# Patient Record
Sex: MALE | Race: BLACK OR AFRICAN AMERICAN | NOT HISPANIC OR LATINO
[De-identification: names, ages, dates, MRNs, and addresses within clinical notes are randomized per-mention and may not be internally consistent; named-entity substitution may affect disease eponyms.]

---

## 2017-11-06 ENCOUNTER — APPOINTMENT (OUTPATIENT)
Dept: NEUROLOGY | Facility: CLINIC | Age: 82
End: 2017-11-06
Payer: MEDICARE

## 2017-11-06 VITALS
DIASTOLIC BLOOD PRESSURE: 60 MMHG | SYSTOLIC BLOOD PRESSURE: 130 MMHG | BODY MASS INDEX: 36.07 KG/M2 | HEIGHT: 68 IN | WEIGHT: 238 LBS

## 2017-11-06 DIAGNOSIS — Z86.39 PERSONAL HISTORY OF OTHER ENDOCRINE, NUTRITIONAL AND METABOLIC DISEASE: ICD-10-CM

## 2017-11-06 DIAGNOSIS — Z95.0 PRESENCE OF CARDIAC PACEMAKER: ICD-10-CM

## 2017-11-06 DIAGNOSIS — Z86.79 PERSONAL HISTORY OF OTHER DISEASES OF THE CIRCULATORY SYSTEM: ICD-10-CM

## 2017-11-06 DIAGNOSIS — M54.16 RADICULOPATHY, LUMBAR REGION: ICD-10-CM

## 2017-11-06 DIAGNOSIS — Z87.891 PERSONAL HISTORY OF NICOTINE DEPENDENCE: ICD-10-CM

## 2017-11-06 PROCEDURE — 99204 OFFICE O/P NEW MOD 45 MIN: CPT

## 2017-11-06 RX ORDER — PYRIDOXINE HCL (VITAMIN B6) 100 MG
TABLET ORAL
Refills: 0 | Status: ACTIVE | COMMUNITY

## 2017-11-06 RX ORDER — AMLODIPINE BESYLATE 10 MG/1
10 TABLET ORAL
Refills: 0 | Status: ACTIVE | COMMUNITY

## 2017-11-06 RX ORDER — OMEGA-3/DHA/EPA/FISH OIL 300-1000MG
1000 CAPSULE ORAL
Refills: 0 | Status: ACTIVE | COMMUNITY

## 2017-11-06 RX ORDER — OMEPRAZOLE MAGNESIUM 40 MG/1
CAPSULE, DELAYED RELEASE ORAL
Refills: 0 | Status: ACTIVE | COMMUNITY

## 2017-11-06 RX ORDER — BRIMONIDINE TARTRATE 1 MG/ML
0.1 SOLUTION/ DROPS OPHTHALMIC
Refills: 0 | Status: ACTIVE | COMMUNITY

## 2017-11-06 RX ORDER — DICLOFENAC SODIUM 20 MG/G
SOLUTION TOPICAL
Refills: 0 | Status: ACTIVE | COMMUNITY

## 2017-11-06 RX ORDER — TAMSULOSIN HYDROCHLORIDE 0.4 MG/1
0.4 CAPSULE ORAL
Refills: 0 | Status: ACTIVE | COMMUNITY

## 2017-11-06 RX ORDER — SIMVASTATIN 20 MG/1
20 TABLET, FILM COATED ORAL
Refills: 0 | Status: ACTIVE | COMMUNITY

## 2017-11-06 RX ORDER — POTASSIUM CHLORIDE 20 MEQ
20 TABLET, EXT RELEASE, PARTICLES/CRYSTALS ORAL
Refills: 0 | Status: ACTIVE | COMMUNITY

## 2017-11-06 RX ORDER — METOPROLOL TARTRATE 50 MG/1
50 TABLET, FILM COATED ORAL
Refills: 0 | Status: ACTIVE | COMMUNITY

## 2017-11-06 RX ORDER — LISINOPRIL 10 MG/1
10 TABLET ORAL
Refills: 0 | Status: ACTIVE | COMMUNITY

## 2017-11-06 RX ORDER — FUROSEMIDE 40 MG/1
40 TABLET ORAL
Refills: 0 | Status: ACTIVE | COMMUNITY

## 2019-05-14 ENCOUNTER — OUTPATIENT (OUTPATIENT)
Dept: OUTPATIENT SERVICES | Facility: HOSPITAL | Age: 84
LOS: 1 days | End: 2019-05-14
Payer: MEDICARE

## 2019-05-14 VITALS
TEMPERATURE: 97 F | HEIGHT: 67 IN | WEIGHT: 220.02 LBS | RESPIRATION RATE: 16 BRPM | DIASTOLIC BLOOD PRESSURE: 70 MMHG | HEART RATE: 60 BPM | SYSTOLIC BLOOD PRESSURE: 140 MMHG

## 2019-05-14 DIAGNOSIS — Z90.79 ACQUIRED ABSENCE OF OTHER GENITAL ORGAN(S): Chronic | ICD-10-CM

## 2019-05-14 DIAGNOSIS — Z98.890 OTHER SPECIFIED POSTPROCEDURAL STATES: Chronic | ICD-10-CM

## 2019-05-14 DIAGNOSIS — Z96.652 PRESENCE OF LEFT ARTIFICIAL KNEE JOINT: Chronic | ICD-10-CM

## 2019-05-14 DIAGNOSIS — Z96.651 PRESENCE OF RIGHT ARTIFICIAL KNEE JOINT: Chronic | ICD-10-CM

## 2019-05-14 DIAGNOSIS — Z95.0 PRESENCE OF CARDIAC PACEMAKER: Chronic | ICD-10-CM

## 2019-05-14 DIAGNOSIS — Z98.49 CATARACT EXTRACTION STATUS, UNSPECIFIED EYE: Chronic | ICD-10-CM

## 2019-05-14 DIAGNOSIS — Z12.11 ENCOUNTER FOR SCREENING FOR MALIGNANT NEOPLASM OF COLON: ICD-10-CM

## 2019-05-14 DIAGNOSIS — Z29.9 ENCOUNTER FOR PROPHYLACTIC MEASURES, UNSPECIFIED: ICD-10-CM

## 2019-05-14 DIAGNOSIS — Z90.49 ACQUIRED ABSENCE OF OTHER SPECIFIED PARTS OF DIGESTIVE TRACT: Chronic | ICD-10-CM

## 2019-05-14 DIAGNOSIS — Z01.818 ENCOUNTER FOR OTHER PREPROCEDURAL EXAMINATION: ICD-10-CM

## 2019-05-14 LAB
ANION GAP SERPL CALC-SCNC: 11 MMOL/L — SIGNIFICANT CHANGE UP (ref 5–17)
APTT BLD: 30.9 SEC — SIGNIFICANT CHANGE UP (ref 27.5–36.3)
BUN SERPL-MCNC: 23 MG/DL — HIGH (ref 8–20)
CALCIUM SERPL-MCNC: 9.5 MG/DL — SIGNIFICANT CHANGE UP (ref 8.6–10.2)
CHLORIDE SERPL-SCNC: 109 MMOL/L — HIGH (ref 98–107)
CO2 SERPL-SCNC: 24 MMOL/L — SIGNIFICANT CHANGE UP (ref 22–29)
CREAT SERPL-MCNC: 1.38 MG/DL — HIGH (ref 0.5–1.3)
GLUCOSE SERPL-MCNC: 117 MG/DL — HIGH (ref 70–115)
HCT VFR BLD CALC: 36.1 % — LOW (ref 42–52)
HGB BLD-MCNC: 11.7 G/DL — LOW (ref 14–18)
INR BLD: 1.28 RATIO — HIGH (ref 0.88–1.16)
MCHC RBC-ENTMCNC: 32.1 PG — HIGH (ref 27–31)
MCHC RBC-ENTMCNC: 32.4 G/DL — SIGNIFICANT CHANGE UP (ref 32–36)
MCV RBC AUTO: 98.9 FL — HIGH (ref 80–94)
PLATELET # BLD AUTO: 165 K/UL — SIGNIFICANT CHANGE UP (ref 150–400)
POTASSIUM SERPL-MCNC: 4.4 MMOL/L — SIGNIFICANT CHANGE UP (ref 3.5–5.3)
POTASSIUM SERPL-SCNC: 4.4 MMOL/L — SIGNIFICANT CHANGE UP (ref 3.5–5.3)
PROTHROM AB SERPL-ACNC: 14.8 SEC — HIGH (ref 10–12.9)
RBC # BLD: 3.65 M/UL — LOW (ref 4.6–6.2)
RBC # FLD: 13.8 % — SIGNIFICANT CHANGE UP (ref 11–15.6)
SODIUM SERPL-SCNC: 144 MMOL/L — SIGNIFICANT CHANGE UP (ref 135–145)
WBC # BLD: 3.3 K/UL — LOW (ref 4.8–10.8)
WBC # FLD AUTO: 3.3 K/UL — LOW (ref 4.8–10.8)

## 2019-05-14 PROCEDURE — 80048 BASIC METABOLIC PNL TOTAL CA: CPT

## 2019-05-14 PROCEDURE — 93005 ELECTROCARDIOGRAM TRACING: CPT

## 2019-05-14 PROCEDURE — 85027 COMPLETE CBC AUTOMATED: CPT

## 2019-05-14 PROCEDURE — 36415 COLL VENOUS BLD VENIPUNCTURE: CPT

## 2019-05-14 PROCEDURE — 85730 THROMBOPLASTIN TIME PARTIAL: CPT

## 2019-05-14 PROCEDURE — 85610 PROTHROMBIN TIME: CPT

## 2019-05-14 PROCEDURE — 93010 ELECTROCARDIOGRAM REPORT: CPT

## 2019-05-14 NOTE — H&P PST ADULT - NSICDXPROBLEM_GEN_ALL_CORE_FT
PROBLEM DIAGNOSES  Problem: Need for prophylactic measure  Assessment and Plan: Caprini Score 10 High risk,  Surgical team should assess /Strongly recommend pharmacological and mechanical measures for VTE prophylaxis     Problem: Screening for colon cancer  Assessment and Plan: colonoscopy and endoscopy. Medical and cardiac clearance pending

## 2019-05-14 NOTE — H&P PST ADULT - NSICDXPASTSURGICALHX_GEN_ALL_CORE_FT
PAST SURGICAL HISTORY:  H/O total knee replacement, left     History of esophageal surgery     Pacemaker [place] [employer]    S/P cataract surgery     S/P colectomy 1992    S/P TURP

## 2019-05-14 NOTE — H&P PST ADULT - HISTORY OF PRESENT ILLNESS
87 year old male 87 year old male with history of colon cancer and polyps now scheduled for screening colonoscopy and endoscopy, he states that they found some blood in his stool

## 2019-05-14 NOTE — H&P PST ADULT - NSICDXPASTMEDICALHX_GEN_ALL_CORE_FT
PAST MEDICAL HISTORY:  Bradycardia     Colon cancer     Glaucoma     Hypertension     Irregular heart rate     Pacemaker [employer]    Prostate cancer

## 2019-05-14 NOTE — H&P PST ADULT - ASSESSMENT
medications reviewed, instructions given on what medications to take and what not to take. Asked the patient to take the Blood pressure medication/ heart medication on DOP. Asked the patient to consult with cardiologist about holding ASA and Eliquis and the pt  agreed. Pt ststes that he was told by his cardiologist to stop Eliquis 4 days prior to procedure medications reviewed, instructions given on what medications to take and what not to take. Asked the patient to take the Blood pressure medication/ heart medication on DOP. Asked the patient to consult with cardiologist about holding ASA and Eliquis and the pt  agreed. Pt ststes that he was told by his cardiologist to stop Eliquis 4 days prior to procedure  NERISSAI VTE 2.0 SCORE [CLOT updated 2019]    AGE RELATED RISK FACTORS                                                       MOBILITY RELATED FACTORS  [ ] Age 41-60 years                                            (1 Point)                    [ ] Bed rest                                                        (1 Point)  [ ] Age: 61-74 years                                           (2 Points)                  [ ] Plaster cast                                                   (2 Points)  [x ] Age= 75 years                                              (3 Points)                    [ ] Bed bound for more than 72 hours                 (2 Points)    DISEASE RELATED RISK FACTORS                                               GENDER SPECIFIC FACTORS  [ x] Edema in the lower extremities                       (1 Point)              [ ] Pregnancy                                                     (1 Point)  [ ] Varicose veins                                               (1 Point)                     [ ] Post-partum < 6 weeks                                   (1 Point)             [x ] BMI > 25 Kg/m2                                            (1 Point)                     [ ] Hormonal therapy  or oral contraception          (1 Point)                 [ ] Sepsis (in the previous month)                        (1 Point)               [ ] History of pregnancy complications                 (1 point)  [ ] Pneumonia or serious lung disease                                               [ ] Unexplained or recurrent                     (1 Point)           (in the previous month)                               (1 Point)  [ ] Abnormal pulmonary function test                     (1 Point)                 SURGERY RELATED RISK FACTORS  [ ] Acute myocardial infarction                              (1 Point)               [ ]  Section                                             (1 Point)  [ ] Congestive heart failure (in the previous month)  (1 Point)      [x ] Minor surgery                                                  (1 Point)   [ ] Inflammatory bowel disease                             (1 Point)               [ ] Arthroscopic surgery                                        (2 Points)  [ ] Central venous access                                      (2 Points)                [ ] General surgery lasting more than 45 minutes (2 points)  [x ] Malignancy- Present or previous                   (2 Points)                [ ] Elective arthroplasty                                         (5 points)    [ ] Stroke (in the previous month)                          (5 Points)                                                                                                                                                           HEMATOLOGY RELATED FACTORS                                                 TRAUMA RELATED RISK FACTORS  [ ] Prior episodes of VTE                                     (3 Points)                [ ] Fracture of the hip, pelvis, or leg                       (5 Points)  [ ] Positive family history for VTE                         (3 Points)             [ ] Acute spinal cord injury (in the previous month)  (5 Points)  [ ] Prothrombin 69953 A                                     (3 Points)               [ ] Paralysis  (less than 1 month)                             (5 Points)  [ ] Factor V Leiden                                             (3 Points)                  [ ] Multiple Trauma within 1 month                        (5 Points)  [ ] Lupus anticoagulants                                     (3 Points)                                                           [ ] Anticardiolipin antibodies                               (3 Points)                                                       [ ] High homocysteine in the blood                      (3 Points)                                             [ ] Other congenital or acquired thrombophilia      (3 Points)                                                [ ] Heparin induced thrombocytopenia                  (3 Points)                                     Total Score [8          ]  OPIOID RISK TOOL    FLORIN EACH BOX THAT APPLIES AND ADD TOTALS AT THE END    FAMILY HISTORY OF SUBSTANCE ABUSE                 FEMALE         MALE                                                Alcohol                             [  ]1 pt          [  ]3pts                                               Illegal Drugs                     [  ]2 pts        [  ]3pts                                               Rx Drugs                           [  ]4 pts        [  ]4 pts    PERSONAL HISTORY OF SUBSTANCE ABUSE                                                                                          Alcohol                             [  ]3 pts       [ x ]3 pts                                               Illegal Drugs                     [  ]4 pts        [  ]4 pts                                               Rx Drugs                           [  ]5 pts        [  ]5 pts    AGE BETWEEN 16-45 YEARS                                      [  ]1 pt         [  ]1 pt    HISTORY OF PREADOLESCENT   SEXUAL ABUSE                                                             [  ]3 pts        [  ]0pts    PSYCHOLOGICAL DISEASE                     ADD, OCD, Bipolar, Schizophrenia        [  ]2 pts         [  ]2 pts                      Depression                                               [  ]1 pt           [  ]1 pt           SCORING TOTAL   (add numbers and type here)              (3 )                                     A score of 3 or lower indicated LOW risk for future opioid abuse  A score of 4 to 7 indicated moderate risk for future opioid abuse  A score of 8 or higher indicates a high risk for opioid abuse

## 2019-05-14 NOTE — H&P PST ADULT - EKG AND INTERPRETATION
EKG reviewed, no acute changes, Sinus laura, LBBB, official reading pending. cardiac clearance pending

## 2019-05-20 ENCOUNTER — TRANSCRIPTION ENCOUNTER (OUTPATIENT)
Age: 84
End: 2019-05-20

## 2019-05-21 ENCOUNTER — OUTPATIENT (OUTPATIENT)
Dept: OUTPATIENT SERVICES | Facility: HOSPITAL | Age: 84
LOS: 1 days | End: 2019-05-21
Payer: MEDICARE

## 2019-05-21 ENCOUNTER — RESULT REVIEW (OUTPATIENT)
Age: 84
End: 2019-05-21

## 2019-05-21 DIAGNOSIS — Z95.0 PRESENCE OF CARDIAC PACEMAKER: Chronic | ICD-10-CM

## 2019-05-21 DIAGNOSIS — Z90.49 ACQUIRED ABSENCE OF OTHER SPECIFIED PARTS OF DIGESTIVE TRACT: Chronic | ICD-10-CM

## 2019-05-21 DIAGNOSIS — Z98.890 OTHER SPECIFIED POSTPROCEDURAL STATES: Chronic | ICD-10-CM

## 2019-05-21 DIAGNOSIS — Z90.79 ACQUIRED ABSENCE OF OTHER GENITAL ORGAN(S): Chronic | ICD-10-CM

## 2019-05-21 DIAGNOSIS — Z87.19 PERSONAL HISTORY OF OTHER DISEASES OF THE DIGESTIVE SYSTEM: ICD-10-CM

## 2019-05-21 DIAGNOSIS — Z98.49 CATARACT EXTRACTION STATUS, UNSPECIFIED EYE: Chronic | ICD-10-CM

## 2019-05-21 DIAGNOSIS — Z96.652 PRESENCE OF LEFT ARTIFICIAL KNEE JOINT: Chronic | ICD-10-CM

## 2019-05-21 DIAGNOSIS — Z85.038 PERSONAL HISTORY OF OTHER MALIGNANT NEOPLASM OF LARGE INTESTINE: ICD-10-CM

## 2019-05-21 PROCEDURE — 88305 TISSUE EXAM BY PATHOLOGIST: CPT

## 2019-05-21 PROCEDURE — 88342 IMHCHEM/IMCYTCHM 1ST ANTB: CPT

## 2019-05-21 PROCEDURE — 43239 EGD BIOPSY SINGLE/MULTIPLE: CPT

## 2019-05-21 PROCEDURE — 45380 COLONOSCOPY AND BIOPSY: CPT

## 2019-05-23 LAB — SURGICAL PATHOLOGY STUDY: SIGNIFICANT CHANGE UP

## 2020-03-11 ENCOUNTER — EMERGENCY (EMERGENCY)
Facility: HOSPITAL | Age: 85
LOS: 1 days | Discharge: OTHER INSTITUTION | End: 2020-03-11
Attending: EMERGENCY MEDICINE
Payer: MEDICARE

## 2020-03-11 VITALS
SYSTOLIC BLOOD PRESSURE: 136 MMHG | OXYGEN SATURATION: 99 % | DIASTOLIC BLOOD PRESSURE: 59 MMHG | HEIGHT: 66 IN | TEMPERATURE: 98 F | WEIGHT: 207.9 LBS | HEART RATE: 67 BPM | RESPIRATION RATE: 18 BRPM

## 2020-03-11 VITALS
HEART RATE: 68 BPM | SYSTOLIC BLOOD PRESSURE: 188 MMHG | RESPIRATION RATE: 16 BRPM | DIASTOLIC BLOOD PRESSURE: 68 MMHG | OXYGEN SATURATION: 99 %

## 2020-03-11 DIAGNOSIS — Z98.49 CATARACT EXTRACTION STATUS, UNSPECIFIED EYE: Chronic | ICD-10-CM

## 2020-03-11 DIAGNOSIS — Z90.79 ACQUIRED ABSENCE OF OTHER GENITAL ORGAN(S): Chronic | ICD-10-CM

## 2020-03-11 DIAGNOSIS — Z90.49 ACQUIRED ABSENCE OF OTHER SPECIFIED PARTS OF DIGESTIVE TRACT: Chronic | ICD-10-CM

## 2020-03-11 DIAGNOSIS — Z96.652 PRESENCE OF LEFT ARTIFICIAL KNEE JOINT: Chronic | ICD-10-CM

## 2020-03-11 DIAGNOSIS — Z95.0 PRESENCE OF CARDIAC PACEMAKER: Chronic | ICD-10-CM

## 2020-03-11 DIAGNOSIS — Z98.890 OTHER SPECIFIED POSTPROCEDURAL STATES: Chronic | ICD-10-CM

## 2020-03-11 PROBLEM — C61 MALIGNANT NEOPLASM OF PROSTATE: Chronic | Status: ACTIVE | Noted: 2019-05-14

## 2020-03-11 PROBLEM — C18.9 MALIGNANT NEOPLASM OF COLON, UNSPECIFIED: Chronic | Status: ACTIVE | Noted: 2019-05-14

## 2020-03-11 LAB
ALBUMIN SERPL ELPH-MCNC: 3.5 G/DL — SIGNIFICANT CHANGE UP (ref 3.3–5.2)
ALP SERPL-CCNC: 48 U/L — SIGNIFICANT CHANGE UP (ref 40–120)
ALT FLD-CCNC: 15 U/L — SIGNIFICANT CHANGE UP
ANION GAP SERPL CALC-SCNC: 12 MMOL/L — SIGNIFICANT CHANGE UP (ref 5–17)
APTT BLD: 25.9 SEC — LOW (ref 27.5–36.3)
AST SERPL-CCNC: 20 U/L — SIGNIFICANT CHANGE UP
BASOPHILS # BLD AUTO: 0.04 K/UL — SIGNIFICANT CHANGE UP (ref 0–0.2)
BASOPHILS NFR BLD AUTO: 0.9 % — SIGNIFICANT CHANGE UP (ref 0–2)
BILIRUB SERPL-MCNC: 0.3 MG/DL — LOW (ref 0.4–2)
BLD GP AB SCN SERPL QL: SIGNIFICANT CHANGE UP
BUN SERPL-MCNC: 25 MG/DL — HIGH (ref 8–20)
CALCIUM SERPL-MCNC: 9.1 MG/DL — SIGNIFICANT CHANGE UP (ref 8.6–10.2)
CHLORIDE SERPL-SCNC: 110 MMOL/L — HIGH (ref 98–107)
CO2 SERPL-SCNC: 21 MMOL/L — LOW (ref 22–29)
CREAT SERPL-MCNC: 1.17 MG/DL — SIGNIFICANT CHANGE UP (ref 0.5–1.3)
EOSINOPHIL # BLD AUTO: 0.1 K/UL — SIGNIFICANT CHANGE UP (ref 0–0.5)
EOSINOPHIL NFR BLD AUTO: 2.1 % — SIGNIFICANT CHANGE UP (ref 0–6)
GLUCOSE SERPL-MCNC: 106 MG/DL — HIGH (ref 70–99)
HCT VFR BLD CALC: 26.6 % — LOW (ref 39–50)
HGB BLD-MCNC: 8.4 G/DL — LOW (ref 13–17)
IMM GRANULOCYTES NFR BLD AUTO: 0.4 % — SIGNIFICANT CHANGE UP (ref 0–1.5)
INR BLD: 1.13 RATIO — SIGNIFICANT CHANGE UP (ref 0.88–1.16)
LYMPHOCYTES # BLD AUTO: 1.43 K/UL — SIGNIFICANT CHANGE UP (ref 1–3.3)
LYMPHOCYTES # BLD AUTO: 30.5 % — SIGNIFICANT CHANGE UP (ref 13–44)
MCHC RBC-ENTMCNC: 31.6 GM/DL — LOW (ref 32–36)
MCHC RBC-ENTMCNC: 32.4 PG — SIGNIFICANT CHANGE UP (ref 27–34)
MCV RBC AUTO: 102.7 FL — HIGH (ref 80–100)
MONOCYTES # BLD AUTO: 0.61 K/UL — SIGNIFICANT CHANGE UP (ref 0–0.9)
MONOCYTES NFR BLD AUTO: 13 % — SIGNIFICANT CHANGE UP (ref 2–14)
NEUTROPHILS # BLD AUTO: 2.49 K/UL — SIGNIFICANT CHANGE UP (ref 1.8–7.4)
NEUTROPHILS NFR BLD AUTO: 53.1 % — SIGNIFICANT CHANGE UP (ref 43–77)
OB PNL STL: NEGATIVE — SIGNIFICANT CHANGE UP
PLATELET # BLD AUTO: 209 K/UL — SIGNIFICANT CHANGE UP (ref 150–400)
POTASSIUM SERPL-MCNC: 4.8 MMOL/L — SIGNIFICANT CHANGE UP (ref 3.5–5.3)
POTASSIUM SERPL-SCNC: 4.8 MMOL/L — SIGNIFICANT CHANGE UP (ref 3.5–5.3)
PROT SERPL-MCNC: 6.7 G/DL — SIGNIFICANT CHANGE UP (ref 6.6–8.7)
PROTHROM AB SERPL-ACNC: 12.8 SEC — SIGNIFICANT CHANGE UP (ref 10–12.9)
RBC # BLD: 2.59 M/UL — LOW (ref 4.2–5.8)
RBC # FLD: 13.4 % — SIGNIFICANT CHANGE UP (ref 10.3–14.5)
SODIUM SERPL-SCNC: 143 MMOL/L — SIGNIFICANT CHANGE UP (ref 135–145)
WBC # BLD: 4.69 K/UL — SIGNIFICANT CHANGE UP (ref 3.8–10.5)
WBC # FLD AUTO: 4.69 K/UL — SIGNIFICANT CHANGE UP (ref 3.8–10.5)

## 2020-03-11 PROCEDURE — 93010 ELECTROCARDIOGRAM REPORT: CPT

## 2020-03-11 PROCEDURE — 80053 COMPREHEN METABOLIC PANEL: CPT

## 2020-03-11 PROCEDURE — 85027 COMPLETE CBC AUTOMATED: CPT

## 2020-03-11 PROCEDURE — 96374 THER/PROPH/DIAG INJ IV PUSH: CPT

## 2020-03-11 PROCEDURE — 36415 COLL VENOUS BLD VENIPUNCTURE: CPT

## 2020-03-11 PROCEDURE — 86850 RBC ANTIBODY SCREEN: CPT

## 2020-03-11 PROCEDURE — 99285 EMERGENCY DEPT VISIT HI MDM: CPT

## 2020-03-11 PROCEDURE — 85730 THROMBOPLASTIN TIME PARTIAL: CPT

## 2020-03-11 PROCEDURE — 86900 BLOOD TYPING SEROLOGIC ABO: CPT

## 2020-03-11 PROCEDURE — 86901 BLOOD TYPING SEROLOGIC RH(D): CPT

## 2020-03-11 PROCEDURE — 93005 ELECTROCARDIOGRAM TRACING: CPT

## 2020-03-11 PROCEDURE — 85610 PROTHROMBIN TIME: CPT

## 2020-03-11 PROCEDURE — 99285 EMERGENCY DEPT VISIT HI MDM: CPT | Mod: 25

## 2020-03-11 PROCEDURE — 82272 OCCULT BLD FECES 1-3 TESTS: CPT

## 2020-03-11 RX ORDER — PANTOPRAZOLE SODIUM 20 MG/1
40 TABLET, DELAYED RELEASE ORAL ONCE
Refills: 0 | Status: COMPLETED | OUTPATIENT
Start: 2020-03-11 | End: 2020-03-11

## 2020-03-11 RX ORDER — SODIUM CHLORIDE 9 MG/ML
1000 INJECTION INTRAMUSCULAR; INTRAVENOUS; SUBCUTANEOUS ONCE
Refills: 0 | Status: COMPLETED | OUTPATIENT
Start: 2020-03-11 | End: 2020-03-11

## 2020-03-11 RX ADMIN — SODIUM CHLORIDE 500 MILLILITER(S): 9 INJECTION INTRAMUSCULAR; INTRAVENOUS; SUBCUTANEOUS at 15:34

## 2020-03-11 RX ADMIN — PANTOPRAZOLE SODIUM 40 MILLIGRAM(S): 20 TABLET, DELAYED RELEASE ORAL at 15:34

## 2020-03-11 NOTE — ED ADULT TRIAGE NOTE - CCCP TRG CHIEF CMPLNT
----- Message from Rohini Castro sent at 11/30/2018 11:31 AM CST -----  Contact: Susan Duron    [phone]  Calling to request a refill for cloBAZam (ONFI) 20 mg Tab and VIMPAT 200 mg Tab tablet.    [facility] [place] [place] - [address] AT [facility]             [phone] (Phone)            [phone] (Fax)       rectal bleeding

## 2020-03-11 NOTE — ED ADULT NURSE NOTE - PMH
Bradycardia    Colon cancer    Glaucoma    Hypertension    Irregular heart rate    Pacemaker  [employer]  Prostate cancer

## 2020-03-11 NOTE — ED PROVIDER NOTE - CLINICAL SUMMARY MEDICAL DECISION MAKING FREE TEXT BOX
pt with reported 4 episodes tarry/koko stool over past 2 days without other symptoms, recent colonoscopy and reports infrequent aleve use. spoke to Dr. Jack who states pt was expected at [place] and he had spoke to Dr. Lima. I spoke to Dr. Lima who states pt had recent large cecal polyp several months ago, had it reassessed 8 days ago, very small and was removed. pt with reported 4 episodes tarry/koko stool over past 2 days without other symptoms, recent colonoscopy and reports infrequent aleve use. spoke to Dr. Jack who states pt was expected at [place] and he had spoke to Dr. Lima. I spoke to Dr. Lima who states pt had recent large cecal polyp several months ago, had it reassessed 8 days ago, very small and was removed. given occult blood was neg (tho tarry in appearance possible from residual iron), will check labs, recontact Dr. Lima with hgb, possible obs here and reassess.

## 2020-03-11 NOTE — ED PROVIDER NOTE - PHYSICAL EXAMINATION
Gen: No acute distress, non toxic  HEENT: Mucous membranes moist, pink conjunctivae, EOMI  CV: RRR, nl s1/s2.  Resp: CTAB, normal rate and effort  GI: Abdomen soft, NT, ND. No rebound, no guarding. rectal chaperoned by EBER Matias, with dark tarry with ?slight maroon  : No CVAT  Neuro: A&O x 3, moving all 4 extremities  MSK: No spine or joint tenderness to palpation  Skin: No rashes. intact and perfused.

## 2020-03-11 NOTE — ED ADULT NURSE NOTE - CHPI ED NUR SYMPTOMS NEG
no vomiting/no weakness/no pain/no nausea/no tingling/no chills/no decreased eating/drinking/no fever/no dizziness

## 2020-03-11 NOTE — ED ADULT TRIAGE NOTE - CHIEF COMPLAINT QUOTE
Patient states that he recently had a colonoscopy done and is now having black stools and rectal bleeding. Patient states that he normally takes eliquis

## 2020-03-11 NOTE — ED ADULT NURSE NOTE - OBJECTIVE STATEMENT
Pt c/o of black tarry stool, gas x 2 days. Pt had colonoscopy March 3rd. Pt had some scar tissue removed from a resection of his colon (1992). Pt has denied any SOB, N/V/D, dizziness, recent travel. Pt Pmhx Pacemaker, a fib, resection of colon, achalasia of esophagus, L knee replaced.

## 2020-03-11 NOTE — ED PROVIDER NOTE - OBJECTIVE STATEMENT
89 y/o male hx afib with ppm on eliquis, hx of rectal and prostate cancer, htn c/o 2 days of melena with blood in stool. Had recent colonoscopy 87 y/o male hx afib with ppm on eliquis, hx of rectal and prostate cancer, htn c/o 2 days of melena with blood in stool. Had recent colonoscopy 8 days ago with Dr. Lima at [place]. Follows with Dr. Jack from [facility] for prior colorectal issues who sent pt to [place] for abnl location of polyp. Pt states feels well otherwise, no cp, sob, dizziness, abd pain. States took some aleve the other day. No f/c, no other symptoms. Pt self d/catherine iron and eliquis over past 1.5 days. Had been taking iron for long time wihtout dark stool    ROS: No fever/chills. No eye pain/changes in vision, No ear pain/sore throat/dysphagia, No chest pain/palpitations. No SOB/cough/. No abdominal pain, N/V/D, No dysuria/frequency/discharge, No headache. No Dizziness.    No rashes or breaks in skin. No numbness/tingling/weakness.

## 2020-03-11 NOTE — ED PROVIDER NOTE - PROGRESS NOTE DETAILS
Robinson: pt asymptomatic, hgb 8.7 when prior opne here was <11 though pt with known anemia which is why he is taking iron. Dr. Lima would like pt transferred to [place]. discussed with pt/family, they are agreeable. transfer initiated to [place], no blood transfusion at this time, will monitor.

## 2020-11-11 ENCOUNTER — OUTPATIENT (OUTPATIENT)
Dept: OUTPATIENT SERVICES | Facility: HOSPITAL | Age: 85
LOS: 1 days | End: 2020-11-11

## 2020-11-11 ENCOUNTER — APPOINTMENT (OUTPATIENT)
Dept: NUCLEAR MEDICINE | Facility: CLINIC | Age: 85
End: 2020-11-11
Payer: MEDICARE

## 2020-11-11 DIAGNOSIS — Z90.79 ACQUIRED ABSENCE OF OTHER GENITAL ORGAN(S): Chronic | ICD-10-CM

## 2020-11-11 DIAGNOSIS — Z98.49 CATARACT EXTRACTION STATUS, UNSPECIFIED EYE: Chronic | ICD-10-CM

## 2020-11-11 DIAGNOSIS — Z96.652 PRESENCE OF LEFT ARTIFICIAL KNEE JOINT: Chronic | ICD-10-CM

## 2020-11-11 DIAGNOSIS — Z90.49 ACQUIRED ABSENCE OF OTHER SPECIFIED PARTS OF DIGESTIVE TRACT: Chronic | ICD-10-CM

## 2020-11-11 DIAGNOSIS — Z00.8 ENCOUNTER FOR OTHER GENERAL EXAMINATION: ICD-10-CM

## 2020-11-11 DIAGNOSIS — Z95.0 PRESENCE OF CARDIAC PACEMAKER: Chronic | ICD-10-CM

## 2020-11-11 DIAGNOSIS — Z98.890 OTHER SPECIFIED POSTPROCEDURAL STATES: Chronic | ICD-10-CM

## 2020-11-11 PROCEDURE — 78815 PET IMAGE W/CT SKULL-THIGH: CPT | Mod: 26,PS

## 2020-12-01 RX ADMIN — Medication 650 MILLIGRAM(S): at 19:00

## 2020-12-25 ENCOUNTER — EMERGENCY (EMERGENCY)
Facility: HOSPITAL | Age: 85
LOS: 1 days | Discharge: HOME | End: 2020-12-25
Attending: EMERGENCY MEDICINE
Payer: MEDICARE

## 2020-12-25 VITALS
HEART RATE: 59 BPM | TEMPERATURE: 98 F | DIASTOLIC BLOOD PRESSURE: 77 MMHG | OXYGEN SATURATION: 97 % | RESPIRATION RATE: 16 BRPM | HEIGHT: 66 IN | SYSTOLIC BLOOD PRESSURE: 177 MMHG

## 2020-12-25 DIAGNOSIS — Z96.652 PRESENCE OF LEFT ARTIFICIAL KNEE JOINT: Chronic | ICD-10-CM

## 2020-12-25 DIAGNOSIS — Z98.49 CATARACT EXTRACTION STATUS, UNSPECIFIED EYE: Chronic | ICD-10-CM

## 2020-12-25 DIAGNOSIS — Z90.79 ACQUIRED ABSENCE OF OTHER GENITAL ORGAN(S): Chronic | ICD-10-CM

## 2020-12-25 DIAGNOSIS — Z95.0 PRESENCE OF CARDIAC PACEMAKER: Chronic | ICD-10-CM

## 2020-12-25 DIAGNOSIS — Z98.890 OTHER SPECIFIED POSTPROCEDURAL STATES: Chronic | ICD-10-CM

## 2020-12-25 DIAGNOSIS — Z90.49 ACQUIRED ABSENCE OF OTHER SPECIFIED PARTS OF DIGESTIVE TRACT: Chronic | ICD-10-CM

## 2020-12-25 PROCEDURE — 73610 X-RAY EXAM OF ANKLE: CPT | Mod: 26,LT

## 2020-12-25 PROCEDURE — 73610 X-RAY EXAM OF ANKLE: CPT

## 2020-12-25 PROCEDURE — 99283 EMERGENCY DEPT VISIT LOW MDM: CPT

## 2020-12-25 RX ORDER — OXYCODONE AND ACETAMINOPHEN 5; 325 MG/1; MG/1
1 TABLET ORAL ONCE
Refills: 0 | Status: DISCONTINUED | OUTPATIENT
Start: 2020-12-25 | End: 2020-12-25

## 2020-12-25 RX ADMIN — OXYCODONE AND ACETAMINOPHEN 1 TABLET(S): 5; 325 TABLET ORAL at 05:10

## 2020-12-25 NOTE — ED PROVIDER NOTE - PROGRESS NOTE DETAILS
prelim read: no apparent fractures, significant soft tissue swelling, scleroisis of vessels advised on rest and elevation and fu with ortho/ podiatry.   advised that patient needs repeat sono within a week if still having significant swelling to the lower extremity

## 2020-12-25 NOTE — ED PROVIDER NOTE - PHYSICAL EXAMINATION
left lower extremity diffuse swelling. 2+ dp pulse sensation intact. ecchymotic to the posterior lateral and medial mal. pain with rom of the ankle. 2+ pedal and pretibial edema. no calf tenderness. ankle warm to the touch

## 2020-12-25 NOTE — ED PROVIDER NOTE - OBJECTIVE STATEMENT
88 yo htn, colon ca, prostate cancer, presenting to the ER with left ankle swelling, started about 1 week no accidents or injuries.  no issues with hx of ankle issues or episodes of gout in the past. states that the ankle is very painful, sometimes pulsating. states that he has been taking tylenol and robaxin last taken @ 12pm. states that he had followed with he [place] blood specialist for his prostate cancer and had a sono completed 2 days ago with negative study for DVT. states that he was prescribed tylenol 3 but that the script has not gotten to his pharmacy. denies chest pain, sob, orthopnea. no fever or chills. no back pain.  states that he is not currently getting radiation but that he was given 2 shots for his returned prostate cancer.

## 2020-12-25 NOTE — ED PROVIDER NOTE - NSFOLLOWUPINSTRUCTIONS_ED_ALL_ED_FT
please follow with podiatry  will need to have repeat sono within a week if still having significant swelling to the left leg.   REST and ELEVATE the leg please   tylenol and or motrin for pain control   new or worsening symptoms return to the ER

## 2020-12-25 NOTE — ED PROVIDER NOTE - CLINICAL SUMMARY MEDICAL DECISION MAKING FREE TEXT BOX
89 yo male presenting with atraumatic left ankle pain and lle swelling. outpatient sono with negative dvt. pt with warm ecchymotic left ankle and diffuse swelling to the extremity. good pulses. will check xray pain control. elevate and re-eval

## 2020-12-25 NOTE — ED PROVIDER NOTE - PSH
H/O total knee replacement, left    History of esophageal surgery    Pacemaker  [employer] [employer]  S/P cataract surgery    S/P colectomy  1992  S/P TURP

## 2020-12-25 NOTE — ED PROVIDER NOTE - ATTENDING CONTRIBUTION TO CARE
Winnie: I performed a face to face bedside interview with patient regarding history of present illness, review of symptoms and past medical history. I completed an independent physical exam.  I have discussed patient's plan of care with advanced care provider.   I agree with note as stated above including HISTORY OF PRESENT ILLNESS, HIV, PAST MEDICAL/SURGICAL/FAMILY/SOCIAL HISTORY, ALLERGIES AND HOME MEDICATIONS, REVIEW OF SYSTEMS, PHYSICAL EXAM, MEDICAL DECISION MAKING and any PROGRESS NOTES during the time I functioned as the attending physician for this patient  unless otherwise noted. My brief assessment is as follows: 89M p/w atraumatic L ankle pain/swelling. Negative US yesterday. Did not  his tylenol #3s. No calf ttp. +ecchymosis and swelling to L ankle. Plan for pain control, xray. Patient has pods follow up.

## 2020-12-25 NOTE — ED ADULT NURSE NOTE - NSIMPLEMENTINTERV_GEN_ALL_ED
Implemented All Universal Safety Interventions:  [place] to call system. Call bell, personal items and telephone within reach. Instruct patient to call for assistance. Room bathroom lighting operational. Non-slip footwear when patient is off stretcher. Physically safe environment: no spills, clutter or unnecessary equipment. Stretcher in lowest position, wheels locked, appropriate side rails in place.

## 2020-12-25 NOTE — ED PROVIDER NOTE - CARE PROVIDER_API CALL
Darien Clay (DPM)  Podiatric Medicine and Surgery  [address] [place] [address]  Phone: [phone]  Fax: [phone]  Follow Up Time: 1-3 Days

## 2020-12-25 NOTE — ED ADULT NURSE NOTE - PSH
H/O total knee replacement, left    History of esophageal surgery    Pacemaker  [employer] [employer]  S/P cataract surgery    S/P colectomy  1992  S/P TURP

## 2020-12-25 NOTE — ED PROVIDER NOTE - CARDIAC, MLM
JANE CANTU    Patient Age: 60 year old   Refill request by: Phone.  Caller informed to check with the pharmacy later for their refill.  If problems arise, we will contact patient.  Refill to be: Faxed to [facility] pharmacy    Medication requested to be refilled: HYDROcodone-acetaminophen (NORCO)  MG per tablet         WEIGHT AND HEIGHT:   Wt Readings from Last 1 Encounters:   08/16/19 65.3 kg (144 lb)     Ht Readings from Last 1 Encounters:   08/16/19 5' 7\" (1.702 m)     BMI Readings from Last 1 Encounters:   08/16/19 22.55 kg/m²       ALLERGIES:  Amaranth (fd&c red #2)   (food or med); Iodinated diagnostic agents; and Glycerol, iodinated  Current Outpatient Medications   Medication   • HYDROcodone-acetaminophen (NORCO)  MG per tablet   • buPROPion (WELLBUTRIN SR) 150 MG 12 hr tablet   • olopatadine (PATANOL) 0.1 % ophthalmic solution   • hydrochlorothiazide (HYDRODIURIL) 12.5 MG tablet     No current facility-administered medications for this visit.      PHARMACY to use:           Pharmacy preference(s) on file:   [id number] - [place], [place] - [address] [address]  Phone: [phone] Fax: [phone]      CALL BACK INFO: Ok to leave response (including medical information) on answering machine  ROUTING: Patient's physician/staff        PCP: Lee Covarrubias MD         INS: Payor: WALKER/ALEXA / Plan: [id number] / Product Type: PPO MISC   PATIENT ADDRESS:  [address] [place] [id number]   Normal rate, regular rhythm.  Heart sounds S1, S2.  No murmurs, rubs or gallops.

## 2020-12-25 NOTE — ED ADULT NURSE NOTE - OBJECTIVE STATEMENT
88yo male AOx4 c/o ankle pain and swelling. pt reports work up for blood clot yesterday was negative. pt denies fever, chills, nausea/vomiting, shortness of breath. pt ambulated with walker at baseline. respirations even and unlabored. abd soft and nondistended. skin WNL for race.

## 2020-12-25 NOTE — ED ADULT TRIAGE NOTE - CHIEF COMPLAINT QUOTE
C/o left lower leg and ankle pain x1 week. Ambulated into ED with walker. Edema noted to lower leg. Pt reports he had workup for blood clot yesterday, results negative.

## 2020-12-25 NOTE — ED PROVIDER NOTE - PATIENT PORTAL LINK FT
You can access the FollowMyHealth Patient Portal offered by [facility] by registering at the following website: [email or web address]. By joining [facility]’s FollowMyHealth portal, you will also be able to view your health information using other applications (apps) compatible with our system.

## 2020-12-30 ENCOUNTER — EMERGENCY (EMERGENCY)
Facility: HOSPITAL | Age: 85
LOS: 1 days | Discharge: HOME | End: 2020-12-30
Attending: EMERGENCY MEDICINE
Payer: MEDICARE

## 2020-12-30 VITALS
TEMPERATURE: 99 F | RESPIRATION RATE: 18 BRPM | OXYGEN SATURATION: 99 % | SYSTOLIC BLOOD PRESSURE: 145 MMHG | DIASTOLIC BLOOD PRESSURE: 65 MMHG | HEIGHT: 66 IN | WEIGHT: 207.01 LBS | HEART RATE: 54 BPM

## 2020-12-30 DIAGNOSIS — Z90.79 ACQUIRED ABSENCE OF OTHER GENITAL ORGAN(S): Chronic | ICD-10-CM

## 2020-12-30 DIAGNOSIS — Z90.49 ACQUIRED ABSENCE OF OTHER SPECIFIED PARTS OF DIGESTIVE TRACT: Chronic | ICD-10-CM

## 2020-12-30 DIAGNOSIS — Z98.890 OTHER SPECIFIED POSTPROCEDURAL STATES: Chronic | ICD-10-CM

## 2020-12-30 DIAGNOSIS — Z96.652 PRESENCE OF LEFT ARTIFICIAL KNEE JOINT: Chronic | ICD-10-CM

## 2020-12-30 DIAGNOSIS — Z98.49 CATARACT EXTRACTION STATUS, UNSPECIFIED EYE: Chronic | ICD-10-CM

## 2020-12-30 DIAGNOSIS — Z95.0 PRESENCE OF CARDIAC PACEMAKER: Chronic | ICD-10-CM

## 2020-12-30 LAB
ALBUMIN SERPL ELPH-MCNC: 3.4 G/DL — SIGNIFICANT CHANGE UP (ref 3.3–5.2)
ALP SERPL-CCNC: 82 U/L — SIGNIFICANT CHANGE UP (ref 40–120)
ALT FLD-CCNC: 11 U/L — SIGNIFICANT CHANGE UP
ANION GAP SERPL CALC-SCNC: 11 MMOL/L — SIGNIFICANT CHANGE UP (ref 5–17)
AST SERPL-CCNC: 16 U/L — SIGNIFICANT CHANGE UP
BASOPHILS # BLD AUTO: 0.03 K/UL — SIGNIFICANT CHANGE UP (ref 0–0.2)
BASOPHILS NFR BLD AUTO: 0.6 % — SIGNIFICANT CHANGE UP (ref 0–2)
BILIRUB SERPL-MCNC: 0.4 MG/DL — SIGNIFICANT CHANGE UP (ref 0.4–2)
BUN SERPL-MCNC: 27 MG/DL — HIGH (ref 8–20)
CALCIUM SERPL-MCNC: 8.2 MG/DL — LOW (ref 8.6–10.2)
CHLORIDE SERPL-SCNC: 107 MMOL/L — SIGNIFICANT CHANGE UP (ref 98–107)
CO2 SERPL-SCNC: 21 MMOL/L — LOW (ref 22–29)
CREAT SERPL-MCNC: 1.15 MG/DL — SIGNIFICANT CHANGE UP (ref 0.5–1.3)
EOSINOPHIL # BLD AUTO: 0.11 K/UL — SIGNIFICANT CHANGE UP (ref 0–0.5)
EOSINOPHIL NFR BLD AUTO: 2.3 % — SIGNIFICANT CHANGE UP (ref 0–6)
GLUCOSE SERPL-MCNC: 114 MG/DL — HIGH (ref 70–99)
HCT VFR BLD CALC: 27.1 % — LOW (ref 39–50)
HGB BLD-MCNC: 8.6 G/DL — LOW (ref 13–17)
IMM GRANULOCYTES NFR BLD AUTO: 0.2 % — SIGNIFICANT CHANGE UP (ref 0–1.5)
LACTATE BLDV-MCNC: 1.2 MMOL/L — SIGNIFICANT CHANGE UP (ref 0.5–2)
LYMPHOCYTES # BLD AUTO: 0.65 K/UL — LOW (ref 1–3.3)
LYMPHOCYTES # BLD AUTO: 13.8 % — SIGNIFICANT CHANGE UP (ref 13–44)
MCHC RBC-ENTMCNC: 31.7 GM/DL — LOW (ref 32–36)
MCHC RBC-ENTMCNC: 32.2 PG — SIGNIFICANT CHANGE UP (ref 27–34)
MCV RBC AUTO: 101.5 FL — HIGH (ref 80–100)
MONOCYTES # BLD AUTO: 0.59 K/UL — SIGNIFICANT CHANGE UP (ref 0–0.9)
MONOCYTES NFR BLD AUTO: 12.5 % — SIGNIFICANT CHANGE UP (ref 2–14)
NEUTROPHILS # BLD AUTO: 3.32 K/UL — SIGNIFICANT CHANGE UP (ref 1.8–7.4)
NEUTROPHILS NFR BLD AUTO: 70.6 % — SIGNIFICANT CHANGE UP (ref 43–77)
PLATELET # BLD AUTO: 201 K/UL — SIGNIFICANT CHANGE UP (ref 150–400)
POTASSIUM SERPL-MCNC: 5.1 MMOL/L — SIGNIFICANT CHANGE UP (ref 3.5–5.3)
POTASSIUM SERPL-SCNC: 5.1 MMOL/L — SIGNIFICANT CHANGE UP (ref 3.5–5.3)
PROT SERPL-MCNC: 7 G/DL — SIGNIFICANT CHANGE UP (ref 6.6–8.7)
RBC # BLD: 2.67 M/UL — LOW (ref 4.2–5.8)
RBC # FLD: 13.2 % — SIGNIFICANT CHANGE UP (ref 10.3–14.5)
SODIUM SERPL-SCNC: 139 MMOL/L — SIGNIFICANT CHANGE UP (ref 135–145)
WBC # BLD: 4.71 K/UL — SIGNIFICANT CHANGE UP (ref 3.8–10.5)
WBC # FLD AUTO: 4.71 K/UL — SIGNIFICANT CHANGE UP (ref 3.8–10.5)

## 2020-12-30 PROCEDURE — 73590 X-RAY EXAM OF LOWER LEG: CPT | Mod: 26,LT

## 2020-12-30 PROCEDURE — 99218: CPT | Mod: GC

## 2020-12-30 PROCEDURE — 73610 X-RAY EXAM OF ANKLE: CPT | Mod: 26,LT

## 2020-12-30 PROCEDURE — 71045 X-RAY EXAM CHEST 1 VIEW: CPT | Mod: 26

## 2020-12-30 PROCEDURE — 93010 ELECTROCARDIOGRAM REPORT: CPT

## 2020-12-30 PROCEDURE — 93971 EXTREMITY STUDY: CPT | Mod: 26,LT

## 2020-12-30 RX ORDER — CEFAZOLIN SODIUM 1 G
2000 VIAL (EA) INJECTION ONCE
Refills: 0 | Status: COMPLETED | OUTPATIENT
Start: 2020-12-30 | End: 2020-12-30

## 2020-12-30 RX ORDER — ASPIRIN/CALCIUM CARB/MAGNESIUM 324 MG
81 TABLET ORAL DAILY
Refills: 0 | Status: DISCONTINUED | OUTPATIENT
Start: 2020-12-30 | End: 2021-01-04

## 2020-12-30 RX ORDER — LISINOPRIL 2.5 MG/1
20 TABLET ORAL DAILY
Refills: 0 | Status: DISCONTINUED | OUTPATIENT
Start: 2020-12-30 | End: 2021-01-04

## 2020-12-30 RX ORDER — FUROSEMIDE 40 MG
40 TABLET ORAL DAILY
Refills: 0 | Status: DISCONTINUED | OUTPATIENT
Start: 2020-12-30 | End: 2021-01-04

## 2020-12-30 RX ORDER — ACETAMINOPHEN 500 MG
650 TABLET ORAL EVERY 6 HOURS
Refills: 0 | Status: DISCONTINUED | OUTPATIENT
Start: 2020-12-30 | End: 2021-01-04

## 2020-12-30 RX ORDER — FERROUS SULFATE 325(65) MG
325 TABLET ORAL DAILY
Refills: 0 | Status: DISCONTINUED | OUTPATIENT
Start: 2020-12-30 | End: 2021-01-04

## 2020-12-30 RX ORDER — METOPROLOL TARTRATE 50 MG
50 TABLET ORAL
Refills: 0 | Status: DISCONTINUED | OUTPATIENT
Start: 2020-12-30 | End: 2021-01-04

## 2020-12-30 RX ORDER — LACTOBACILLUS ACIDOPHILUS 100MM CELL
1 CAPSULE ORAL DAILY
Refills: 0 | Status: DISCONTINUED | OUTPATIENT
Start: 2020-12-30 | End: 2021-01-04

## 2020-12-30 RX ORDER — OXYCODONE AND ACETAMINOPHEN 5; 325 MG/1; MG/1
1 TABLET ORAL EVERY 8 HOURS
Refills: 0 | Status: DISCONTINUED | OUTPATIENT
Start: 2020-12-30 | End: 2021-01-01

## 2020-12-30 RX ORDER — TAMSULOSIN HYDROCHLORIDE 0.4 MG/1
0.4 CAPSULE ORAL AT BEDTIME
Refills: 0 | Status: DISCONTINUED | OUTPATIENT
Start: 2020-12-30 | End: 2021-01-04

## 2020-12-30 RX ORDER — APIXABAN 2.5 MG/1
5 TABLET, FILM COATED ORAL EVERY 12 HOURS
Refills: 0 | Status: DISCONTINUED | OUTPATIENT
Start: 2020-12-30 | End: 2021-01-04

## 2020-12-30 RX ORDER — POTASSIUM CHLORIDE 20 MEQ
20 PACKET (EA) ORAL DAILY
Refills: 0 | Status: DISCONTINUED | OUTPATIENT
Start: 2020-12-30 | End: 2021-01-04

## 2020-12-30 RX ORDER — BRIMONIDINE TARTRATE 2 MG/MG
1 SOLUTION/ DROPS OPHTHALMIC DAILY
Refills: 0 | Status: DISCONTINUED | OUTPATIENT
Start: 2020-12-30 | End: 2021-01-04

## 2020-12-30 RX ADMIN — Medication 1 TABLET(S): at 18:28

## 2020-12-30 RX ADMIN — Medication 100 MILLIGRAM(S): at 18:39

## 2020-12-30 RX ADMIN — APIXABAN 5 MILLIGRAM(S): 2.5 TABLET, FILM COATED ORAL at 18:28

## 2020-12-30 RX ADMIN — Medication 81 MILLIGRAM(S): at 18:28

## 2020-12-30 RX ADMIN — Medication 650 MILLIGRAM(S): at 18:28

## 2020-12-30 RX ADMIN — Medication 325 MILLIGRAM(S): at 18:39

## 2020-12-30 RX ADMIN — Medication 100 MILLIGRAM(S): at 15:04

## 2020-12-30 NOTE — ED CDU PROVIDER INITIAL DAY NOTE - ATTENDING CONTRIBUTION TO CARE
89yoM; with PMH of Bradycardia (s/p PPM--[employer]), Colon Ca, Prostate Ca, Glaucoma, HTN; now p/w left LE swelling, pain. dx with cellulitis outpatient, given augmentin x2 days wihthout improvement. sent by PMD to ED>  EXAM: L LE --erythematous anterior lower leg, no crepitus, distal pulses intact  A/P:  89yoM p/w L LE cellulitis  -Clinda q8  -re eval in AM

## 2020-12-30 NOTE — ED STATDOCS - CLINICAL SUMMARY MEDICAL DECISION MAKING FREE TEXT BOX
89 y/o male with LLE swelling x 2 weeks. Recently dx with cellulitis 2 days ago on Augmentin currently. Will obtain repeat ultrasound labs, and likely admission for failed outpatient abx. 88 y/o male with LLE swelling x 2 weeks. Recently dx with cellulitis 2 days ago on Augmentin currently. Will obtain repeat ultrasound labs, and likely admission for failed outpatient abx. CXR shows no active pulmonary disease, venous doppler wnl, macrocytic anemia.

## 2020-12-30 NOTE — ED STATDOCS - PSH
H/O total knee replacement, left    History of esophageal surgery    Pacemaker  [place] [employer]  S/P cataract surgery    S/P colectomy  1992  S/P TURP

## 2020-12-30 NOTE — ED STATDOCS - OBJECTIVE STATEMENT
88 y/o male with PMHx of Bradycardia, Colon cancer, Glaucoma, HTN, Irregular heart rate, Pacemaker ([employer]), and Prostate cancer presents to ED c/o lower leg pain/injury. Patient reports left leg swelling and pain. Patient was seen in [facility] ED 5 days ago for the same complaint. PMD put patient on Amoxicillin 2 days ago. Patient saw his PMD today who sent patient to the ED. Patient has had these symptoms for 2 weeks.    Denies CP, SOB 89 y/o male with PMHx of Bradycardia, Colon cancer, Glaucoma, HTN, Irregular heart rate, Pacemaker ([place] [employer]), and Prostate cancer presents to ED c/o L lower leg swelling and pain. Patient reports left leg swelling and pain x 2 weeks. Patient was seen in [facility] ED 5 days ago for the same complaint, had XR and discharged home. States he had US 1 week ago which was normal. Patient followed up with PMD, diagnosed with cellulitis, patient put on Augmentin x 2 days without improvement. Patient saw his PMD today who sent patient to the ED for evaluation of  LLE cellulitis. Denies injury to leg.     Denies CP, SOB

## 2020-12-30 NOTE — ED CDU PROVIDER INITIAL DAY NOTE - MUSCULOSKELETAL, MLM
Spine appears normal, Skin: (+) LLE erythematous and warm extending up to mid calf; No rash , mild swollen lateral malleolus and TTP

## 2020-12-30 NOTE — ED STATDOCS - PHYSICAL EXAMINATION
Gen: NAD, AOx3  Head: NCAT  HEENT: PERRL, oral mucosa moist, normal conjunctiva, oropharynx clear without exudate or erythema  Lung: CTAB, no respiratory distress, no wheezing, rales, rhonchi  CV: normal s1/s2, rrr, no murmurs, Normal perfusion, pulses 2+ throughout  Abd: soft, NTND, no CVA tenderness  MSK: 2+ pitting edema LLE; no visible deformities, full range of motion in all 4 extremities  Neuro: CN II-XII grossly intact, No focal neurologic deficits  Skin: (+) LLE erythematous and warm extending up to mid calf; No rash   Psych: normal affect Gen: NAD, AOx3  Head: NCAT  Lung: CTAB, no respiratory distress, no wheezing, rales, rhonchi  CV: normal s1/s2, rrr, no murmurs, Normal perfusion  Abd: soft, NTND, no CVA tenderness  MSK: 2+ pitting edema LLE; no visible deformities, full range of motion in all 4 extremities  Neuro: No focal neurologic deficits  Skin: (+) LLE erythematous and warm extending up to mid calf; No rash   Psych: normal affect

## 2020-12-30 NOTE — ED CDU PROVIDER INITIAL DAY NOTE - OBJECTIVE STATEMENT
89 y/o male with PMHx of Bradycardia, Colon cancer, prostate CA , Glaucoma, HTN, Irregular heart rate, Pacemaker ([place] [employer]), presents to ED c/o L lower leg swelling and pain. Patient reports left leg swelling and pain x 2 weeks. Patient was seen in [facility] ED 5 days ago for the same complaint, had XR and discharged home. States he had US 1 week ago which was normal. Patient followed up with PMD, diagnosed with cellulitis, patient put on Augmentin x 2 days without improvement. Patient saw his PMD today who sent patient to the ED for evaluation of  LLE cellulitis. Denies injury to leg.

## 2020-12-30 NOTE — ED STATDOCS - ATTENDING CONTRIBUTION TO CARE
HPI/ROS/PE/MDM by me.     I performed a history and physical exam of the patient and discussed their management with the resident. I reviewed the resident's note and agree with the documented findings and plan of care. My medical decision making and observations are found above.

## 2020-12-30 NOTE — ED CDU PROVIDER INITIAL DAY NOTE - PSH
H/O total knee replacement, left    History of esophageal surgery    Pacemaker  [employer] [employer]  S/P cataract surgery    S/P colectomy  1992  S/P TURP

## 2020-12-30 NOTE — ED ADULT NURSE NOTE - CAS EDN DISCHARGE ASSESSMENT
Alert and oriented to person, place and time/Patient baseline mental status/Awake/Symptoms improved/Dressing clean and dry

## 2020-12-31 PROCEDURE — 99226: CPT

## 2020-12-31 RX ADMIN — Medication 40 MILLIGRAM(S): at 05:04

## 2020-12-31 RX ADMIN — OXYCODONE AND ACETAMINOPHEN 1 TABLET(S): 5; 325 TABLET ORAL at 15:26

## 2020-12-31 RX ADMIN — BRIMONIDINE TARTRATE 1 DROP(S): 2 SOLUTION/ DROPS OPHTHALMIC at 11:27

## 2020-12-31 RX ADMIN — APIXABAN 5 MILLIGRAM(S): 2.5 TABLET, FILM COATED ORAL at 05:04

## 2020-12-31 RX ADMIN — Medication 325 MILLIGRAM(S): at 17:07

## 2020-12-31 RX ADMIN — Medication 100 MILLIGRAM(S): at 21:58

## 2020-12-31 RX ADMIN — OXYCODONE AND ACETAMINOPHEN 1 TABLET(S): 5; 325 TABLET ORAL at 17:05

## 2020-12-31 RX ADMIN — Medication 1 TABLET(S): at 17:07

## 2020-12-31 RX ADMIN — TAMSULOSIN HYDROCHLORIDE 0.4 MILLIGRAM(S): 0.4 CAPSULE ORAL at 05:06

## 2020-12-31 RX ADMIN — LISINOPRIL 20 MILLIGRAM(S): 2.5 TABLET ORAL at 05:04

## 2020-12-31 RX ADMIN — Medication 50 MILLIGRAM(S): at 05:04

## 2020-12-31 RX ADMIN — Medication 600 MILLIGRAM(S): at 14:32

## 2020-12-31 RX ADMIN — TAMSULOSIN HYDROCHLORIDE 0.4 MILLIGRAM(S): 0.4 CAPSULE ORAL at 21:57

## 2020-12-31 RX ADMIN — Medication 50 MILLIGRAM(S): at 17:07

## 2020-12-31 RX ADMIN — Medication 81 MILLIGRAM(S): at 11:26

## 2020-12-31 RX ADMIN — Medication 1 TABLET(S): at 11:26

## 2020-12-31 RX ADMIN — Medication 100 MILLIGRAM(S): at 13:34

## 2020-12-31 RX ADMIN — APIXABAN 5 MILLIGRAM(S): 2.5 TABLET, FILM COATED ORAL at 17:07

## 2020-12-31 RX ADMIN — Medication 100 MILLIGRAM(S): at 05:04

## 2020-12-31 RX ADMIN — Medication 20 MILLIEQUIVALENT(S): at 11:26

## 2020-12-31 RX ADMIN — OXYCODONE AND ACETAMINOPHEN 1 TABLET(S): 5; 325 TABLET ORAL at 05:04

## 2020-12-31 NOTE — ED ADULT NURSE REASSESSMENT NOTE - COMFORT CARE
plan of care explained/wait time explained
meal provided/plan of care explained/po fluids offered/repositioned/wait time explained
meal provided/plan of care explained/po fluids offered/repositioned/wait time explained

## 2021-01-01 VITALS
TEMPERATURE: 98 F | OXYGEN SATURATION: 96 % | SYSTOLIC BLOOD PRESSURE: 175 MMHG | RESPIRATION RATE: 18 BRPM | DIASTOLIC BLOOD PRESSURE: 75 MMHG | HEART RATE: 68 BPM

## 2021-01-01 PROCEDURE — 99284 EMERGENCY DEPT VISIT MOD MDM: CPT | Mod: 25

## 2021-01-01 PROCEDURE — 83605 ASSAY OF LACTIC ACID: CPT

## 2021-01-01 PROCEDURE — 85025 COMPLETE CBC W/AUTO DIFF WBC: CPT

## 2021-01-01 PROCEDURE — 93971 EXTREMITY STUDY: CPT

## 2021-01-01 PROCEDURE — 36415 COLL VENOUS BLD VENIPUNCTURE: CPT

## 2021-01-01 PROCEDURE — 80053 COMPREHEN METABOLIC PANEL: CPT

## 2021-01-01 PROCEDURE — 96376 TX/PRO/DX INJ SAME DRUG ADON: CPT

## 2021-01-01 PROCEDURE — 96366 THER/PROPH/DIAG IV INF ADDON: CPT

## 2021-01-01 PROCEDURE — 73610 X-RAY EXAM OF ANKLE: CPT

## 2021-01-01 PROCEDURE — 99217: CPT

## 2021-01-01 PROCEDURE — 87040 BLOOD CULTURE FOR BACTERIA: CPT

## 2021-01-01 PROCEDURE — 96365 THER/PROPH/DIAG IV INF INIT: CPT

## 2021-01-01 PROCEDURE — 93005 ELECTROCARDIOGRAM TRACING: CPT

## 2021-01-01 PROCEDURE — 71045 X-RAY EXAM CHEST 1 VIEW: CPT

## 2021-01-01 PROCEDURE — 73590 X-RAY EXAM OF LOWER LEG: CPT

## 2021-01-01 PROCEDURE — 96375 TX/PRO/DX INJ NEW DRUG ADDON: CPT

## 2021-01-01 PROCEDURE — G0378: CPT

## 2021-01-01 RX ADMIN — OXYCODONE AND ACETAMINOPHEN 1 TABLET(S): 5; 325 TABLET ORAL at 10:40

## 2021-01-01 RX ADMIN — Medication 1 TABLET(S): at 11:06

## 2021-01-01 RX ADMIN — Medication 600 MILLIGRAM(S): at 07:30

## 2021-01-01 RX ADMIN — OXYCODONE AND ACETAMINOPHEN 1 TABLET(S): 5; 325 TABLET ORAL at 07:30

## 2021-01-01 RX ADMIN — OXYCODONE AND ACETAMINOPHEN 1 TABLET(S): 5; 325 TABLET ORAL at 09:15

## 2021-01-01 RX ADMIN — Medication 50 MILLIGRAM(S): at 05:42

## 2021-01-01 RX ADMIN — Medication 20 MILLIEQUIVALENT(S): at 11:06

## 2021-01-01 RX ADMIN — OXYCODONE AND ACETAMINOPHEN 1 TABLET(S): 5; 325 TABLET ORAL at 01:04

## 2021-01-01 RX ADMIN — Medication 40 MILLIGRAM(S): at 05:42

## 2021-01-01 RX ADMIN — LISINOPRIL 20 MILLIGRAM(S): 2.5 TABLET ORAL at 05:42

## 2021-01-01 RX ADMIN — Medication 100 MILLIGRAM(S): at 05:58

## 2021-01-01 RX ADMIN — Medication 325 MILLIGRAM(S): at 11:06

## 2021-01-01 RX ADMIN — Medication 81 MILLIGRAM(S): at 11:06

## 2021-01-01 RX ADMIN — APIXABAN 5 MILLIGRAM(S): 2.5 TABLET, FILM COATED ORAL at 05:42

## 2021-01-01 RX ADMIN — Medication 1 TABLET(S): at 11:05

## 2021-01-01 NOTE — ED CDU PROVIDER DISPOSITION NOTE - PATIENT PORTAL LINK FT
You can access the FollowMyHealth Patient Portal offered by [facility] by registering at the following website: [email or web address]. By joining [employer]’s FollowMyHealth portal, you will also be able to view your health information using other applications (apps) compatible with our system.

## 2021-01-01 NOTE — ED CDU PROVIDER SUBSEQUENT DAY NOTE - PSH
H/O total knee replacement, left    History of esophageal surgery    Pacemaker  [employer]  S/P cataract surgery    S/P colectomy  1992  S/P TURP    
H/O total knee replacement, left    History of esophageal surgery    Pacemaker  [employer]  S/P cataract surgery    S/P colectomy  1992  S/P TURP

## 2021-01-01 NOTE — ED CDU PROVIDER DISPOSITION NOTE - CLINICAL COURSE
pt is a 89 y/o male with a pmhx of bradycardiac, colon cancer, glaucoma, HTN, pacemaker presenting to the ed with left lower leg swelling/pain for the past two weeks, pt placed in observation for LLE cellulitis, IV antibiotics given, pt re-evaulated in observation symptoms improving, pt ambulating without difficulty, pt discharged home wtih clindamycin f/u with PMD

## 2021-01-01 NOTE — ED CDU PROVIDER SUBSEQUENT DAY NOTE - HISTORY
Pt resting comfortably. Cellulitis improving after IV Clinda. Likely dc in afternoon tomorrow.
PT without acute events overnight, continue on abx

## 2021-01-01 NOTE — ED CDU PROVIDER SUBSEQUENT DAY NOTE - MEDICAL DECISION MAKING DETAILS
Continue to treat cellulitis with IV clinda.
cellulitis of LLE x  2week not improved with po medication ( Augmentin newly started x 2 days ago )    continue  home med   lactate negative   clindamycin Q8 hrs and lactobacillus PO  prednisone 40mg daily x 5 days  for cellulitis   repeat the xray of the / ankle and tib fib   LLE elevation   pain control re-eval in AM

## 2021-01-01 NOTE — ED ADULT NURSE REASSESSMENT NOTE - GENERAL PATIENT STATE
comfortable appearance/cooperative/improvement verbalized/resting/sleeping/smiling/interactive
comfortable appearance/resting/sleeping
comfortable appearance/cooperative/smiling/interactive
comfortable appearance/cooperative
comfortable appearance/cooperative

## 2021-01-01 NOTE — ED CDU PROVIDER SUBSEQUENT DAY NOTE - ATTENDING CONTRIBUTION TO CARE
cellulitis of LLE x  2week not improved with po medication ( Augmentin newly started x 2 days ago )    failed outpatient tx   lactate negative   clindamycin Q8 hrs and lactobacillus PO  prednisone 40mg daily x 5 days  for cellulitis   repeat the xray of the / ankle and tib fib   LLE elevation   pain control re-eval in AM
Patient with improving cellulitis.  VSS.  will continue to monitor.  Non toxic.  Well appearing. Uneventful ED observation period.

## 2021-01-01 NOTE — ED CDU PROVIDER SUBSEQUENT DAY NOTE - FAMILY HISTORY
Family hx of colon cancer     Sibling  Still living? Unknown  FH: throat cancer, Age at diagnosis: Age Unknown

## 2021-01-01 NOTE — ED ADULT NURSE REASSESSMENT NOTE - NS ED NURSE REASSESS COMMENT FT1
Care endorsed to Vandana VELÁZQUEZ. Patient in NAD. Resting in comfort. VSS. RN with no further questions.
Pt VSS. No complaints for RN at this time Pt resting comfortably in bed Will continue to monitor.
Pt able to ambulate with his own walker.  Pt states his leg feels better after ambulating. 400ml clear yellow urine output into urinal without difficulty.  Bed linens changed, gown changed, and clean blankets given.  Call bell within reach. Pt verbalized understanding of plan for discharge in 24 hours.
Pt resting comfortably in bed. Denies any pain at this time. VSS. Will continue to monitor.
Pt sleeping comfortably in bed no complaints of pain will continue to monitor.
Assumed care of pt from previous RN. Pt currently sleeping, no acute distress noted at this time.
VSS, remains afebrile. Pending IV abx at 1400. No further questions for the RN. Will continue to monitor.
Assumed are of the patient at 1930. Verbal report received from EBER Roland. Pt is Aox4 in no acute distress. Pt denies any chest pain dizziness and SOB at this time. Pt states he feels cellulius  is improving in lower extremity. Pt has positive pedal pulses able to move toes. Area is warm to touch and reddened. Leg is elevated Pt pending ABX at 2200. Pt is resting comfortably. Plan of care explained wait time explained, will continue to monitor.
Assumed care of the patient at 0730. Verbal report received from Vandana VELÁZQUEZ ED. Patient A&Ox4. No s/s of acute distress. LLE remains elevated on pillow, +swelling/warm to touch. +pedal pulses, toes warm to touch and mobile. VSS, afebrile. Patient pending IV abx infusion and re-eval. Patient in understanding of plan of care. Patient with no further questions for the RN. Resting in comfort. Call bell within reach and encouraged to use when assistance needed. Will continue to monitor.
Assumed care of the patient at 1820. Verbal report received from Fiona VELÁZQUEZ ED. Patient transferred to observation unit CDU 8. Patient A&Ox4. No s/s of acute distress. LLE warm to touch/swelling+/pain+. Patient given tylenol po for pain as per orders. VSS, remains afebrile. PIV patent. Pedal pulses+. Full ROM+. Patient's own rolling walker at the bedside. Patient pending infusion of IV abx and re-eval. Patient in understanding of plan of care. Patient with no further questions for the RN. Resting in comfort. Call bell within reach and encouraged to use when assistance needed. Will continue to monitor.

## 2021-01-01 NOTE — ED CDU PROVIDER SUBSEQUENT DAY NOTE - CONSTITUTIONAL NEGATIVE STATEMENT, MLM
Patient and family verbalizes understanding of discharge instructions. Pt alert and oriented, appears in no acute distress, respirations equal and unlabored. Patient wheeled to discharge.
no fever and no chills.
no fever and no chills.

## 2021-01-01 NOTE — ED CDU PROVIDER SUBSEQUENT DAY NOTE - PMH
Bradycardia    Colon cancer    Glaucoma    Hypertension    Irregular heart rate    Pacemaker  [employer]  Prostate cancer    
Bradycardia    Colon cancer    Glaucoma    Hypertension    Irregular heart rate    Pacemaker  [employer]  Prostate cancer

## 2021-01-01 NOTE — ED CDU PROVIDER DISPOSITION NOTE - ATTENDING CONTRIBUTION TO CARE
Patient seen with PA.  cellulitis is improving.  patient without sx.  will d/c.  Uneventful ED observation period. Non toxic.  Well appearing. Discussed results and outcome of testing with the patient.  Patient advised to please follow up with their primary care doctor within the next 24 hours and return to the Emergency Department for worsening symptoms or any other concerns.  Patient advised that their doctor may call  to follow up on the specific results of the tests performed today in the emergency department.

## 2021-01-04 LAB
CULTURE RESULTS: SIGNIFICANT CHANGE UP
CULTURE RESULTS: SIGNIFICANT CHANGE UP
SPECIMEN SOURCE: SIGNIFICANT CHANGE UP
SPECIMEN SOURCE: SIGNIFICANT CHANGE UP

## 2022-01-18 ENCOUNTER — EMERGENCY (EMERGENCY)
Facility: HOSPITAL | Age: 87
LOS: 1 days | Discharge: HOME | End: 2022-01-18
Attending: EMERGENCY MEDICINE
Payer: MEDICARE

## 2022-01-18 VITALS
WEIGHT: 199.96 LBS | DIASTOLIC BLOOD PRESSURE: 82 MMHG | TEMPERATURE: 98 F | OXYGEN SATURATION: 99 % | HEART RATE: 54 BPM | RESPIRATION RATE: 16 BRPM | HEIGHT: 66 IN | SYSTOLIC BLOOD PRESSURE: 202 MMHG

## 2022-01-18 DIAGNOSIS — Z95.0 PRESENCE OF CARDIAC PACEMAKER: Chronic | ICD-10-CM

## 2022-01-18 DIAGNOSIS — Z90.49 ACQUIRED ABSENCE OF OTHER SPECIFIED PARTS OF DIGESTIVE TRACT: Chronic | ICD-10-CM

## 2022-01-18 DIAGNOSIS — Z90.79 ACQUIRED ABSENCE OF OTHER GENITAL ORGAN(S): Chronic | ICD-10-CM

## 2022-01-18 DIAGNOSIS — Z98.49 CATARACT EXTRACTION STATUS, UNSPECIFIED EYE: Chronic | ICD-10-CM

## 2022-01-18 DIAGNOSIS — Z96.652 PRESENCE OF LEFT ARTIFICIAL KNEE JOINT: Chronic | ICD-10-CM

## 2022-01-18 DIAGNOSIS — Z98.890 OTHER SPECIFIED POSTPROCEDURAL STATES: Chronic | ICD-10-CM

## 2022-01-18 LAB
ALBUMIN SERPL ELPH-MCNC: 4 G/DL — SIGNIFICANT CHANGE UP (ref 3.3–5.2)
ALP SERPL-CCNC: 46 U/L — SIGNIFICANT CHANGE UP (ref 40–120)
ALT FLD-CCNC: 11 U/L — SIGNIFICANT CHANGE UP
ANION GAP SERPL CALC-SCNC: 12 MMOL/L — SIGNIFICANT CHANGE UP (ref 5–17)
AST SERPL-CCNC: 17 U/L — SIGNIFICANT CHANGE UP
BASOPHILS # BLD AUTO: 0.04 K/UL — SIGNIFICANT CHANGE UP (ref 0–0.2)
BASOPHILS NFR BLD AUTO: 0.8 % — SIGNIFICANT CHANGE UP (ref 0–2)
BILIRUB SERPL-MCNC: 0.5 MG/DL — SIGNIFICANT CHANGE UP (ref 0.4–2)
BUN SERPL-MCNC: 19.8 MG/DL — SIGNIFICANT CHANGE UP (ref 8–20)
CALCIUM SERPL-MCNC: 9.8 MG/DL — SIGNIFICANT CHANGE UP (ref 8.6–10.2)
CHLORIDE SERPL-SCNC: 104 MMOL/L — SIGNIFICANT CHANGE UP (ref 98–107)
CO2 SERPL-SCNC: 26 MMOL/L — SIGNIFICANT CHANGE UP (ref 22–29)
CREAT SERPL-MCNC: 0.85 MG/DL — SIGNIFICANT CHANGE UP (ref 0.5–1.3)
EOSINOPHIL # BLD AUTO: 0.02 K/UL — SIGNIFICANT CHANGE UP (ref 0–0.5)
EOSINOPHIL NFR BLD AUTO: 0.4 % — SIGNIFICANT CHANGE UP (ref 0–6)
GLUCOSE SERPL-MCNC: 102 MG/DL — HIGH (ref 70–99)
HCT VFR BLD CALC: 33.9 % — LOW (ref 39–50)
HGB BLD-MCNC: 11.1 G/DL — LOW (ref 13–17)
IMM GRANULOCYTES NFR BLD AUTO: 0.4 % — SIGNIFICANT CHANGE UP (ref 0–1.5)
LYMPHOCYTES # BLD AUTO: 1.27 K/UL — SIGNIFICANT CHANGE UP (ref 1–3.3)
LYMPHOCYTES # BLD AUTO: 26.1 % — SIGNIFICANT CHANGE UP (ref 13–44)
MAGNESIUM SERPL-MCNC: 2 MG/DL — SIGNIFICANT CHANGE UP (ref 1.8–2.6)
MCHC RBC-ENTMCNC: 32.6 PG — SIGNIFICANT CHANGE UP (ref 27–34)
MCHC RBC-ENTMCNC: 32.7 GM/DL — SIGNIFICANT CHANGE UP (ref 32–36)
MCV RBC AUTO: 99.7 FL — SIGNIFICANT CHANGE UP (ref 80–100)
MONOCYTES # BLD AUTO: 0.64 K/UL — SIGNIFICANT CHANGE UP (ref 0–0.9)
MONOCYTES NFR BLD AUTO: 13.2 % — SIGNIFICANT CHANGE UP (ref 2–14)
NEUTROPHILS # BLD AUTO: 2.87 K/UL — SIGNIFICANT CHANGE UP (ref 1.8–7.4)
NEUTROPHILS NFR BLD AUTO: 59.1 % — SIGNIFICANT CHANGE UP (ref 43–77)
PLATELET # BLD AUTO: 165 K/UL — SIGNIFICANT CHANGE UP (ref 150–400)
POTASSIUM SERPL-MCNC: 3.9 MMOL/L — SIGNIFICANT CHANGE UP (ref 3.5–5.3)
POTASSIUM SERPL-SCNC: 3.9 MMOL/L — SIGNIFICANT CHANGE UP (ref 3.5–5.3)
PROT SERPL-MCNC: 7 G/DL — SIGNIFICANT CHANGE UP (ref 6.6–8.7)
RBC # BLD: 3.4 M/UL — LOW (ref 4.2–5.8)
RBC # FLD: 13.3 % — SIGNIFICANT CHANGE UP (ref 10.3–14.5)
SODIUM SERPL-SCNC: 142 MMOL/L — SIGNIFICANT CHANGE UP (ref 135–145)
TROPONIN T SERPL-MCNC: <0.01 NG/ML — SIGNIFICANT CHANGE UP (ref 0–0.06)
WBC # BLD: 4.86 K/UL — SIGNIFICANT CHANGE UP (ref 3.8–10.5)
WBC # FLD AUTO: 4.86 K/UL — SIGNIFICANT CHANGE UP (ref 3.8–10.5)

## 2022-01-18 PROCEDURE — 99285 EMERGENCY DEPT VISIT HI MDM: CPT

## 2022-01-18 RX ORDER — HYDRALAZINE HCL 50 MG
10 TABLET ORAL ONCE
Refills: 0 | Status: DISCONTINUED | OUTPATIENT
Start: 2022-01-18 | End: 2022-01-18

## 2022-01-18 RX ORDER — HYDRALAZINE HCL 50 MG
25 TABLET ORAL ONCE
Refills: 0 | Status: COMPLETED | OUTPATIENT
Start: 2022-01-18 | End: 2022-01-18

## 2022-01-18 RX ADMIN — Medication 25 MILLIGRAM(S): at 23:36

## 2022-01-18 NOTE — ED PROVIDER NOTE - PHYSICAL EXAMINATION
Gen: no acute distress  Head: normocephalic, atraumatic  Lung: CTAB, no respiratory distress, no wheezing, rales, rhonchi  CV: normal s1/s2, rrr,   Abd: soft, no tenderness, non-distended  MSK: 2+ pitting edema to ble, no visible deformities, full range of motion in all 4 extremities  Neuro: No focal neurologic deficits, 5/5 strength to all extremities, sensation intact, CN 2-12 intact  Skin: No rash   Psych: normal affect

## 2022-01-18 NOTE — ED ADULT TRIAGE NOTE - PAIN: PRESENCE, MLM
[facility]  Admission Note   Name:  AYAD WATERS BOY    Twin B  Medical Record Number: [id number]   Admit Date: 2018  Date/Time:  2018 09:55:51  This 2075 gram Birth Wt 35 week gestational age white male  was born to a 26 yr.  mom .   Admit Type: Normal Nursery  Birth Hospital:[facility]  Hospitalization Summary   Hospital Name Adm Date Adm Time DC Date DC Time  [facility] 2018   :    Maternal History   Mom's Age: 26  Race:  White  Blood Type:  AB Pos    P:  0   EDC - OB: 2018  Prenatal Care: Yes  Mom's MR#:  [id number]   Mom's First Name:  Patricia  Mom's Last Name:  Isaac   Complications during Pregnancy, Labor or Delivery: Yes    Pre-eclampsia  Twin gestation  Maternal Steroids: No   Medications During Pregnancy or Labor: Yes  Name Comment  Ancef  Prenatal vitamins  Delivery   YOB: 2018  Time of Birth: 00:00  Fluid at Delivery:  Live Births:  Twin  Birth Order:  B  Presentation:  Compound   Delivering OB:  SARAH Santiago  Anesthesia:  Epidural   Birth Hospital:  [facility]  Delivery Type:   Section   ROM Prior to Delivery: Reason for    APGAR:  1 min:  1  5  min:  8  Admission Physical Exam   Birth Gestation: 35wk 0d  Gender: Male   Birth Weight:  2075 (gms) 11-25%tile  Head Circ: 31.1 (cm) 11-25%tile  Length:  43.2 (cm)11-25%tile  Temperature Heart Rate Resp Rate BP - Sys BP - Marcial BP - Mean O2 Sats  36.8 142 57 50 21 30 99  Intensive cardiac and respiratory monitoring, continuous and/or frequent vital sign monitoring.  Bed Type: Incubator  General: @ 0955, pink, responsive, and quiet  Head/Neck: The head is normal in size and configuration.  Examination is within normal limits for a premature infant  of this gestation.  No edema or bruising noted.  Sutures prominent and open fontanel.   Chest: Clear, equal breath sounds.  NC 40 cc's.  Pink and breathing comfortably.    Heart: Regular  rate and rhythm, without murmur. Pulses are somewhat diminished. CFT 2secs.       Abdomen: Soft and flat. No hepatosplenomegaly. Normal bowel sounds.  Genitalia: Normal external male genitalia are present.  Both testes palpated in scrotum.   Extremities: No deformities noted.  Normal range of motion for all extremities. Hips show no evidence of instability.  Neurologic: Normal tone and activity.  Skin: The skin is pink and well perfused.  No rashes, vesicles, or other lesions are noted.  Respiratory Support   Respiratory Support Start Date Stop Date Dur(d)                                       Comment   Nasal Cannula 2018 1  Settings for Nasal Cannula  FiO2 Flow (lpm)  1 0.04  Procedures   Start Date Stop Date Dur(d)Clinician Comment   PIV 2018 1  Labs   CBC Time WBC Hgb Hct Plts Segs Bands Lymph [place] Eos Baso Imm nRBC Retic   18 08:45 14.4 20.6 58.9 198 10.00  Intake/Output   Route: PO  Planned Intake Prot Prot feeds/  Fluid Type Graeme/oz Dex % g/kg g/100mL Amt mL/feed day mL/hr mL/kg/day Comment  Breast Milk-Donor 22 IMB 22, ad  trae  Breast Milk-Isac 20    Nutritional Support   Diagnosis Start Date End Date  Nutritional Support 2018  Allasedhbjfw-pldmbbqt-keybq 2018   History   From NBN to N for low blood sugar of 10 with no resolution after glucose gel.  IVF started for glucose support at 80  ml/kg/day.    Assessment   Blood sugar check followingone hour of IVF is now 85.     Plan   vTPN at 75 ml/kg/day.  Start ad trae feeds or MBM or DBM and wean IVF per glucose checks.     Respiratory Distress   Diagnosis Start Date End Date  Respiratory Distress - (other) 2018   History   Placed in Nasal cannula oxygen of 40 cc's flow for mild respiratory distress.  BS clear.  Comfortable.  Pink.     Plan   Support as indicated.   R/O Wkkyti-lthkhvf-pwosrdeio   Diagnosis Start Date End Date  R/O Enogvq-gnpinys-jdsucxzwm 2018   History   No septic risk secondary to prematurity.   No  blood culture obtained on this twin.   Plan   Obtain CBC and will draw blood culture if CBC results indicate it or if clinically indicated.    Prematurity   Diagnosis Start Date End Date  Late  Infant  35 wks 2018   History   35 weeks.     Plan   Provide appropriate cares and interventions.   Multiple Gestation   Diagnosis Start Date End Date  Twin Gestation 2018   History   Twin B, Di/Di.   Psychosocial Intervention   Diagnosis Start Date End Date  Psychosocial Intervention 2018   History   Father present at admission to N.  Briefly informed of plan of action .  Treatment permits signed.    Plan   Keep parents updated.   Health Maintenance    Screening   Date Comment  2018     ___________________________________________ ___________________________________________  MD Wanda Holloway, CODY  Comment    As this patient`s attending physician, I provided on-site coordination of the healthcare team inclusive of the  advanced practitioner which included patient assessment, directing the patient`s plan of care, and making decisions  regarding the patient`s management on this visit`s date of service as reflected in the documentation above.   denies pain/discomfort

## 2022-01-18 NOTE — ED PROVIDER NOTE - CARE PROVIDER_API CALL
Rosa Rai)  Cardiovascular Disease; Internal Medicine  [address], [address]  [place] [address]  Phone: [phone]  Fax: [phone]  Follow Up Time: 1-3 Days

## 2022-01-18 NOTE — ED PROVIDER NOTE - IV ALTEPLASE DOOR HIDDEN
----- Message from Heike Pena MD sent at 11/23/2018  4:31 PM CST -----  Please call patient to inform them that their Urine Culture did not grow any bacteria.  In the clinic visit she was clinically showing signs of a urinary tract infection, however per ER documentation, patient was instructed to stop antibiotic. Please offer patient follow up visit in clinic.  Please continue the plan of care discussed with your provider.   Thank you!  Heike Pena MD  Family Medicine, PGY-3  11/23/18       show

## 2022-01-18 NOTE — ED PROVIDER NOTE - ATTENDING CONTRIBUTION TO CARE
89 yo male presents due to noting elevated blood pressure at home. Patient has history of elevated blood pressure. He states his new physician increased his lisinopril from 20 to 40 several weeks ago. He obtained a new blood pressure machine for his wife two days ago. He also took his blood pressure and noted it was elevated. When he took it tonight he noted the systolic value was over 200 and his daughter recommended he come to the ED for evaluation. Patient states he has been generally well except chronic swelling to feet which he is followed by his podiatrist ( appointment this Friday for re-evaluation after recent treatment).  Gen: Alert, NAD  Head: NC, AT, PERRL, EOMI, normal lids/conjunctiva  ENT: normal hearing, patent oropharynx without erythema/exudate, uvula midline  Neck: +supple, no tenderness/meningismus/JVD, +Trachea midline  Pulm: Bilateral BS, normal resp effort, no wheeze/stridor/retractions  CV: RRR, no R/G, +dist pulses. 1+ edema bilaterally chronically  Abd: soft, NT/ND, +BS, no hepatosplenomegaly  Mskel: no edema/erythema/cyanosis  Skin: no rash  Neuro: AAOx3, no gross sensory/motor deficits, CN 2-12 intact.  I personally saw the patient with the resident, and completed the key components of the history and physical exam. I then discussed the management plan with the resident.

## 2022-01-18 NOTE — ED PROVIDER NOTE - PROGRESS NOTE DETAILS
Patient resting comfortably. BP noted 178/95 Reviewed negative w/u results with pt. Pt reports feeling much improved. Will give rx for hydralazine, Comfortable with plan for d/c. Pt agrees to f/u with pcp. Return instructions given, questions answered. - Jose Burch, PGY-3 Patient resting comfortably. Repeat blood pressure is as noted. Patient aware to follow-up with [facility] ( his cardiology group) or pmd to discuss medication adjustment. Patient currently on furosemide 40, metoprolol 50mg, and lisinopril 40 mg. Discussed management plan with daughter Bharti ( nurse) whom was able to ask any additional questions and agrees with the plan. Patient BP at discharge was 210/ 90 ( manually). Patient very anxious regarding BP value. Medication ordered.   Patient resting comfortably. Repeat blood pressure is as noted. Patient aware to follow-up with [facility] ( his cardiology group) or pmd to discuss medication adjustment. Patient currently on furosemide 40, metoprolol 50mg, and lisinopril 40 mg. Discussed management plan with daughter Bharti ( nurse) whom was able to ask any additional questions and agrees with the plan. Repeat BP is as noted. Patient noted to be hypotensive. Secondary to IV hydralazine. Will hold further medication at this time and continue to monitor. Spoke with Dr. Vidal who stated patient should stop metoprolol, start carvedilol 12.5mg BID and Amlodipine 5mg daily.

## 2022-01-18 NOTE — ED PROVIDER NOTE - NSICDXFAMILYHX_GEN_ALL_CORE_FT
FAMILY HISTORY:  Family hx of colon cancer    Sibling  Still living? Unknown  FH: throat cancer, Age at diagnosis: Age Unknown

## 2022-01-18 NOTE — ED PROVIDER NOTE - NSFOLLOWUPINSTRUCTIONS_ED_ALL_ED_FT
Hypertension    Hypertension, commonly called high blood pressure, is when the force of blood pumping through your arteries is too strong. Hypertension forces your heart to work harder to pump blood. Your arteries may become narrow or stiff. Having untreated or uncontrolled hypertension for a long period of time can cause heart attack, stroke, kidney disease, and other problems. If started on a medication, take exactly as prescribed by your health care professional. Maintain a healthy lifestyle and follow up with your primary care physician.    SEEK IMMEDIATE MEDICAL CARE IF YOU HAVE ANY OF THE FOLLOWING SYMPTOMS: severe headache, confusion, chest pain, abdominal pain, vomiting, or shortness of breath. 1) Follow up with your doctor/cardiologist in 1-2 days  2) Return to the ER for worsening or concerning symptoms  3) take medication as prescribed      Hypertension    WHAT YOU NEED TO KNOW:    Hypertension is high blood pressure. Your blood pressure is the force of your blood moving against the walls of your arteries. Hypertension causes your blood pressure to get so high that your heart has to work much harder than normal. This can damage your heart. The cause of hypertension may not be known. This is called essential or primary hypertension. Hypertension caused by another medical condition, such as kidney disease, is called secondary hypertension.    DISCHARGE INSTRUCTIONS:    Call your local emergency number (911 in the ) or have someone call if:   •You have chest pain.      •You have any of the following signs of a heart attack: ?Squeezing, pressure, or pain in your chest      ?You may also have any of the following: ?Discomfort or pain in your back, neck, jaw, stomach, or arm      ?Shortness of breath      ?Nausea or vomiting      ?Lightheadedness or a sudden cold sweat        •You become confused or have trouble speaking.      •You suddenly feel lightheaded or have trouble breathing.      Return to the emergency department if:   •You have a severe headache or vision loss.      •You have weakness in an arm or leg.      Call your doctor or cardiologist if:   •You feel faint, dizzy, confused, or drowsy.      •You have been taking your blood pressure medicine but your pressure is higher than your provider says it should be.      •You have questions or concerns about your condition or care.      Medicines: You may need any of the following:  •Antihypertensives may be used to help lower your blood pressure. Several kinds of medicines are available. Your healthcare provider will choose medicines based on the kind of hypertension you have. You may need more than one type of medicine. Take the medicine exactly as directed.      •Diuretics help decrease extra fluid that collects in your body. This will help lower your blood pressure. You may urinate more often while you take this medicine.      •Cholesterol medicine helps lower your cholesterol level. A low cholesterol level helps prevent heart disease and makes it easier to control your blood pressure.      •Take your medicine as directed. Contact your healthcare provider if you think your medicine is not helping or if you have side effects. Tell him or her if you are allergic to any medicine. Keep a list of the medicines, vitamins, and herbs you take. Include the amounts, and when and why you take them. Bring the list or the pill bottles to follow-up visits. Carry your medicine list with you in case of an emergency.      Follow up with your doctor or cardiologist as directed: You will need to return to have your blood pressure checked and to have other lab tests done. Write down your questions so you remember to ask them during your visits.    Stages of hypertension:     Blood Pressure Readings     •Normal blood pressure is 119/79 or lower. Your healthcare provider may only check your blood pressure each year if it stays at a normal level.      •Elevated blood pressure is 120/79 to 129/79. This is sometimes called prehypertension. Your healthcare provider may suggest lifestyle changes to help lower your blood pressure to a normal level. He or she may then check it again in 3 to 6 months.      •Stage 1 hypertension is 130/80 to 139/89. Your provider may recommend lifestyle changes, medication, and checks every 3 to 6 months until your blood pressure is controlled.      •Stage 2 hypertension is 140/90 or higher. Your provider will recommend lifestyle changes and have you take 2 kinds of hypertension medicines. You will also need to have your blood pressure checked monthly until it is controlled.      Manage hypertension:   •Check your blood pressure at home. Avoid smoking, caffeine, and exercise at least 30 minutes before checking your blood pressure. Sit and rest for 5 minutes before you take your blood pressure. Extend your arm and support it on a flat surface. Your arm should be at the same level as your heart. Follow the directions that came with your blood pressure monitor. Check your blood pressure 2 times, 1 minute apart, before you take your medicine in the morning. Also check your blood pressure before your evening meal. Keep a record of your readings and bring it to your follow-up visits. Ask your healthcare provider what your blood pressure should be.  How to take a Blood Pressure           •Manage any other health conditions you have. Health conditions such as diabetes can increase your risk for hypertension. Follow your healthcare provider's instructions and take all your medicines as directed.      •Ask about all medicines. Certain medicines can increase your blood pressure. Examples include oral birth control pills, decongestants, herbal supplements, and NSAIDs, such as ibuprofen. Your healthcare provider can tell you which medicines are safe for you to take. This includes prescription and over-the-counter medicines.      Lifestyle changes you can make to manage hypertension: Your healthcare provider may recommend you work with a team to manage hypertension. The team may include medical experts such as a dietitian, an exercise or physical therapist, and a behavior therapist. Your family members may be included in helping you create lifestyle changes.    Ways to Lower Your Blood Pressure     •Limit sodium (salt) as directed. Too much sodium can affect your fluid balance. Check labels to find low-sodium or no-salt-added foods. Some low-sodium foods use potassium salts for flavor. Too much potassium can also cause health problems. Your healthcare provider will tell you how much sodium and potassium are safe for you to have in a day. He or she may recommend that you limit sodium to 2,300 mg a day.             •Follow the meal plan recommended by your healthcare provider. A dietitian or your provider can give you more information on low-sodium plans or the DASH (Dietary Approaches to Stop Hypertension) eating plan. The DASH plan is low in sodium, processed sugar, unhealthy fats, and total fat. It is high in potassium, calcium, and fiber. These can be found in vegetables, fruit, and whole-grain foods.             •Be physically active throughout the day. Physical activity, such as exercise, can help control your blood pressure and your weight. Be physically active for at least 30 minutes per day, on most days of the week. Include aerobic activity, such as walking or riding a bicycle. Also include strength training at least 2 times each week. Your healthcare providers can help you create a physical activity plan.  Ways to Be Physically Active       Strength Training for Adults           •Decrease stress. This may help lower your blood pressure. Learn ways to relax, such as deep breathing or listening to music.      •Limit alcohol as directed. Alcohol can increase your blood pressure. A drink of alcohol is 12 ounces of beer, 5 ounces of wine, or 1½ ounces of liquor.      •Do not smoke. Nicotine and other chemicals in cigarettes and cigars can increase your blood pressure and also cause lung damage. Ask your healthcare provider for information if you currently smoke and need help to quit. E-cigarettes or smokeless tobacco still contain nicotine. Talk to your healthcare provider before you use these products.  Prevent Heart Disease 1) Follow up with your doctor/cardiologist in 1-2 days  2) Return to the ER for worsening or concerning symptoms  3) take medication as prescribed:  YOUR CARDIOLOGIST HAS SUGGESTED THAT YOU:    -a)stop your metoprolol  -b)start Carvedilol 12.5mg twice daily  -c)start Amlodipine 5mg once daily  4) Follow up with your cardiologist.       Hypertension    WHAT YOU NEED TO KNOW:    Hypertension is high blood pressure. Your blood pressure is the force of your blood moving against the walls of your arteries. Hypertension causes your blood pressure to get so high that your heart has to work much harder than normal. This can damage your heart. The cause of hypertension may not be known. This is called essential or primary hypertension. Hypertension caused by another medical condition, such as kidney disease, is called secondary hypertension.    DISCHARGE INSTRUCTIONS:    Call your local emergency number (911 in the ) or have someone call if:   •You have chest pain.      •You have any of the following signs of a heart attack: ?Squeezing, pressure, or pain in your chest      ?You may also have any of the following: ?Discomfort or pain in your back, neck, jaw, stomach, or arm      ?Shortness of breath      ?Nausea or vomiting      ?Lightheadedness or a sudden cold sweat        •You become confused or have trouble speaking.      •You suddenly feel lightheaded or have trouble breathing.      Return to the emergency department if:   •You have a severe headache or vision loss.      •You have weakness in an arm or leg.      Call your doctor or cardiologist if:   •You feel faint, dizzy, confused, or drowsy.      •You have been taking your blood pressure medicine but your pressure is higher than your provider says it should be.      •You have questions or concerns about your condition or care.      Medicines: You may need any of the following:  •Antihypertensives may be used to help lower your blood pressure. Several kinds of medicines are available. Your healthcare provider will choose medicines based on the kind of hypertension you have. You may need more than one type of medicine. Take the medicine exactly as directed.      •Diuretics help decrease extra fluid that collects in your body. This will help lower your blood pressure. You may urinate more often while you take this medicine.      •Cholesterol medicine helps lower your cholesterol level. A low cholesterol level helps prevent heart disease and makes it easier to control your blood pressure.      •Take your medicine as directed. Contact your healthcare provider if you think your medicine is not helping or if you have side effects. Tell him or her if you are allergic to any medicine. Keep a list of the medicines, vitamins, and herbs you take. Include the amounts, and when and why you take them. Bring the list or the pill bottles to follow-up visits. Carry your medicine list with you in case of an emergency.      Follow up with your doctor or cardiologist as directed: You will need to return to have your blood pressure checked and to have other lab tests done. Write down your questions so you remember to ask them during your visits.    Stages of hypertension:     Blood Pressure Readings     •Normal blood pressure is 119/79 or lower. Your healthcare provider may only check your blood pressure each year if it stays at a normal level.      •Elevated blood pressure is 120/79 to 129/79. This is sometimes called prehypertension. Your healthcare provider may suggest lifestyle changes to help lower your blood pressure to a normal level. He or she may then check it again in 3 to 6 months.      •Stage 1 hypertension is 130/80 to 139/89. Your provider may recommend lifestyle changes, medication, and checks every 3 to 6 months until your blood pressure is controlled.      •Stage 2 hypertension is 140/90 or higher. Your provider will recommend lifestyle changes and have you take 2 kinds of hypertension medicines. You will also need to have your blood pressure checked monthly until it is controlled.      Manage hypertension:   •Check your blood pressure at home. Avoid smoking, caffeine, and exercise at least 30 minutes before checking your blood pressure. Sit and rest for 5 minutes before you take your blood pressure. Extend your arm and support it on a flat surface. Your arm should be at the same level as your heart. Follow the directions that came with your blood pressure monitor. Check your blood pressure 2 times, 1 minute apart, before you take your medicine in the morning. Also check your blood pressure before your evening meal. Keep a record of your readings and bring it to your follow-up visits. Ask your healthcare provider what your blood pressure should be.  How to take a Blood Pressure           •Manage any other health conditions you have. Health conditions such as diabetes can increase your risk for hypertension. Follow your healthcare provider's instructions and take all your medicines as directed.      •Ask about all medicines. Certain medicines can increase your blood pressure. Examples include oral birth control pills, decongestants, herbal supplements, and NSAIDs, such as ibuprofen. Your healthcare provider can tell you which medicines are safe for you to take. This includes prescription and over-the-counter medicines.      Lifestyle changes you can make to manage hypertension: Your healthcare provider may recommend you work with a team to manage hypertension. The team may include medical experts such as a dietitian, an exercise or physical therapist, and a behavior therapist. Your family members may be included in helping you create lifestyle changes.    Ways to Lower Your Blood Pressure     •Limit sodium (salt) as directed. Too much sodium can affect your fluid balance. Check labels to find low-sodium or no-salt-added foods. Some low-sodium foods use potassium salts for flavor. Too much potassium can also cause health problems. Your healthcare provider will tell you how much sodium and potassium are safe for you to have in a day. He or she may recommend that you limit sodium to 2,300 mg a day.             •Follow the meal plan recommended by your healthcare provider. A dietitian or your provider can give you more information on low-sodium plans or the DASH (Dietary Approaches to Stop Hypertension) eating plan. The DASH plan is low in sodium, processed sugar, unhealthy fats, and total fat. It is high in potassium, calcium, and fiber. These can be found in vegetables, fruit, and whole-grain foods.             •Be physically active throughout the day. Physical activity, such as exercise, can help control your blood pressure and your weight. Be physically active for at least 30 minutes per day, on most days of the week. Include aerobic activity, such as walking or riding a bicycle. Also include strength training at least 2 times each week. Your healthcare providers can help you create a physical activity plan.  Ways to Be Physically Active       Strength Training for Adults           •Decrease stress. This may help lower your blood pressure. Learn ways to relax, such as deep breathing or listening to music.      •Limit alcohol as directed. Alcohol can increase your blood pressure. A drink of alcohol is 12 ounces of beer, 5 ounces of wine, or 1½ ounces of liquor.      •Do not smoke. Nicotine and other chemicals in cigarettes and cigars can increase your blood pressure and also cause lung damage. Ask your healthcare provider for information if you currently smoke and need help to quit. E-cigarettes or smokeless tobacco still contain nicotine. Talk to your healthcare provider before you use these products.  Prevent Heart Disease

## 2022-01-18 NOTE — ED PROVIDER NOTE - CLINICAL SUMMARY MEDICAL DECISION MAKING FREE TEXT BOX
Pt presenting for eval of htn x1 month, despite doubled home meds. Currently c/o headache, but neuro intact. Will order labs, trop, cth, treat bp and reeval.

## 2022-01-18 NOTE — ED ADULT TRIAGE NOTE - CHIEF COMPLAINT QUOTE
Pt has h/o HTN and his blood pressure has been elevated for a few weeks. Has been to his doctor and they have been trying to change his medications but daughter states that it is not working. Pt c/o feeling fatigue. Denies any chest pain, headache.

## 2022-01-18 NOTE — ED PROVIDER NOTE - NSICDXPASTSURGICALHX_GEN_ALL_CORE_FT
PAST SURGICAL HISTORY:  H/O total knee replacement, left     History of esophageal surgery     Pacemaker [place] [employer]    S/P cataract surgery     S/P colectomy 1992    S/P TURP

## 2022-01-18 NOTE — ED PROVIDER NOTE - PATIENT PORTAL LINK FT
You can access the FollowMyHealth Patient Portal offered by [facility] by registering at the following website: [email or web address]. By joining [employer]’s FollowMyHealth portal, you will also be able to view your health information using other applications (apps) compatible with our system. You can access the FollowMyHealth Patient Portal offered by [facility] by registering at the following website: [email or web address]. By joining [employer]’s FollowMyHealth portal, you will also be able to view your health information using other applications (apps) compatible with our system. You can access the FollowMyHealth Patient Portal offered by [facility] by registering at the following website: [email or web address]. By joining [employer]’s FollowMyHealth portal, you will also be able to view your health information using other applications (apps) compatible with our system.

## 2022-01-19 VITALS
HEART RATE: 63 BPM | RESPIRATION RATE: 18 BRPM | OXYGEN SATURATION: 100 % | DIASTOLIC BLOOD PRESSURE: 71 MMHG | SYSTOLIC BLOOD PRESSURE: 140 MMHG | TEMPERATURE: 97 F

## 2022-01-19 LAB
FLUAV AG NPH QL: SIGNIFICANT CHANGE UP
FLUBV AG NPH QL: SIGNIFICANT CHANGE UP
RSV RNA NPH QL NAA+NON-PROBE: SIGNIFICANT CHANGE UP
SARS-COV-2 RNA SPEC QL NAA+PROBE: SIGNIFICANT CHANGE UP

## 2022-01-19 PROCEDURE — 36415 COLL VENOUS BLD VENIPUNCTURE: CPT

## 2022-01-19 PROCEDURE — 83735 ASSAY OF MAGNESIUM: CPT

## 2022-01-19 PROCEDURE — 99284 EMERGENCY DEPT VISIT MOD MDM: CPT | Mod: 25

## 2022-01-19 PROCEDURE — 80053 COMPREHEN METABOLIC PANEL: CPT

## 2022-01-19 PROCEDURE — 70450 CT HEAD/BRAIN W/O DYE: CPT | Mod: MA

## 2022-01-19 PROCEDURE — 70450 CT HEAD/BRAIN W/O DYE: CPT | Mod: 26,MA

## 2022-01-19 PROCEDURE — 87637 SARSCOV2&INF A&B&RSV AMP PRB: CPT

## 2022-01-19 PROCEDURE — 85025 COMPLETE CBC W/AUTO DIFF WBC: CPT

## 2022-01-19 PROCEDURE — 84484 ASSAY OF TROPONIN QUANT: CPT

## 2022-01-19 PROCEDURE — 96374 THER/PROPH/DIAG INJ IV PUSH: CPT

## 2022-01-19 RX ORDER — CARVEDILOL PHOSPHATE 80 MG/1
1 CAPSULE, EXTENDED RELEASE ORAL
Qty: 20 | Refills: 0
Start: 2022-01-19 | End: 2022-01-28

## 2022-01-19 RX ORDER — HYDRALAZINE HCL 50 MG
10 TABLET ORAL ONCE
Refills: 0 | Status: COMPLETED | OUTPATIENT
Start: 2022-01-19 | End: 2022-01-19

## 2022-01-19 RX ORDER — HYDRALAZINE HCL 50 MG
1 TABLET ORAL
Qty: 30 | Refills: 0
Start: 2022-01-19 | End: 2022-02-17

## 2022-01-19 RX ORDER — HYDRALAZINE HCL 50 MG
25 TABLET ORAL ONCE
Refills: 0 | Status: COMPLETED | OUTPATIENT
Start: 2022-01-19 | End: 2022-01-19

## 2022-01-19 RX ORDER — AMLODIPINE BESYLATE 2.5 MG/1
5 TABLET ORAL ONCE
Refills: 0 | Status: COMPLETED | OUTPATIENT
Start: 2022-01-19 | End: 2022-01-19

## 2022-01-19 RX ORDER — HYDRALAZINE HCL 50 MG
1 TABLET ORAL
Qty: 30 | Refills: 0
Start: 2022-01-19 | End: 2022-02-02

## 2022-01-19 RX ORDER — AMLODIPINE BESYLATE 2.5 MG/1
1 TABLET ORAL
Qty: 10 | Refills: 0
Start: 2022-01-19 | End: 2022-01-28

## 2022-01-19 RX ADMIN — AMLODIPINE BESYLATE 5 MILLIGRAM(S): 2.5 TABLET ORAL at 04:10

## 2022-01-19 RX ADMIN — Medication 25 MILLIGRAM(S): at 03:26

## 2022-01-19 RX ADMIN — Medication 10 MILLIGRAM(S): at 04:10

## 2022-01-19 NOTE — ED ADULT NURSE REASSESSMENT NOTE - NS ED NURSE REASSESS COMMENT FT1
pt complaining of new onset headache and nausea post hydralazine push. /58. NSR. Resting at this time. Will continue to monitor and follow plan of care.

## 2022-01-19 NOTE — ED ADULT NURSE NOTE - NSIMPLEMENTINTERV_GEN_ALL_ED
Implemented All Fall Risk Interventions:  [place] to call system. Call bell, personal items and telephone within reach. Instruct patient to call for assistance. Room bathroom lighting operational. Non-slip footwear when patient is off stretcher. Physically safe environment: no spills, clutter or unnecessary equipment. Stretcher in lowest position, wheels locked, appropriate side rails in place. Provide visual cue, wrist band, yellow gown, etc. Monitor gait and stability. Monitor for mental status changes and reorient to person, place, and time. Review medications for side effects contributing to fall risk. Reinforce activity limits and safety measures with patient and family.

## 2022-01-19 NOTE — ED ADULT NURSE NOTE - NSICDXPASTSURGICALHX_GEN_ALL_CORE_FT
PAST SURGICAL HISTORY:  H/O total knee replacement, left     History of esophageal surgery     Pacemaker [employer]    S/P cataract surgery     S/P colectomy 1992    S/P TURP

## 2022-01-19 NOTE — CONSULT NOTE ADULT - SUBJECTIVE AND OBJECTIVE BOX
[facility], [facility]                                          [address], [address], [place] [address]                                               PHONE: [phone]    FAX: [phone] [address] [facility], [address], [place] [address]                                       PHONE: [phone]    FAX: [phone]  *******************************************************************************    Reason for Consult: HTN    HPI:  VIANEY PETE JR is a 89y man with HTN, HLD, paroxsymal afib, sick sinus syndrome s/p PPM (2005 with generator change 2013), who presents to the ER for evaluation of elevated BP.  The patient states that despite his current regimen, BP has remained persistently elevated.  NO chest pain, palpitations, shortness of breath, syncope or near syncope.     PAST MEDICAL & SURGICAL HISTORY:  Hypertension    Irregular heart rate    Pacemaker  [place] [employer]    Bradycardia    Glaucoma    Prostate cancer    Colon cancer    S/P colectomy  1992    H/O total knee replacement, left    Pacemaker  [place] [employer]    S/P TURP    S/P cataract surgery    History of esophageal surgery        No Known Allergies      MEDICATIONS  (STANDING):    MEDICATIONS  (PRN):      Social History: no active tobacco / EtOH / IVDA    Family History: Family hx of colon cancer    FH: throat cancer (Sibling)        ROS: As noted above, otherwise unremarkable.    Vital Signs Last 24 Hrs  T(C): 36.2 (19 Jan 2022 13:50), Max: 36.9 (18 Jan 2022 20:09)  T(F): 97.1 (19 Jan 2022 13:50), Max: 98.5 (18 Jan 2022 20:09)  HR: 63 (19 Jan 2022 13:50) (50 - 69)  BP: 140/71 (19 Jan 2022 13:50) (136/63 - 202/79)  RR: 18 (19 Jan 2022 13:50) (18 - 20)  SpO2: 100% (19 Jan 2022 13:50) (98% - 100%)      PHYSICAL EXAM:  General: Appears well developed, well nourished, no acute distress  HEENT: Head: normocephalic, atraumatic  Eyes: Pupils equal and reactive  Neck: Supple, no carotid bruit, no JVD, no HJR  CARDIOVASCULAR: Normal S1 and S2, no murmur, rub, or gallop  LUNGS: Clear to auscultation bilaterally, no rales, rhonchi or wheeze  ABDOMEN: Soft, nontender, non-distended, positive bowel sounds, no mass or bruit  EXTREMITIES: No edema, distal pulses WNL  SKIN: Warm and dry with normal turgor  NEURO: Alert & oriented x 3, grossly intact  PSYCH: normal mood and affect    LABS:                        11.1   4.86  )-----------( 165      ( 18 Jan 2022 23:23 )             33.9     01-18    142  |  104  |  19.8  ----------------------------<  102<H>  3.9   |  26.0  |  0.85    Ca    9.8      18 Jan 2022 23:23  Mg     2.0     01-18    TPro  7.0  /  Alb  4.0  /  TBili  0.5  /  DBili  x   /  AST  17  /  ALT  11  /  AlkPhos  46  01-18    CARDIAC MARKERS ( 18 Jan 2022 23:23 )  x     / <0.01 ng/mL / x     / x     / x          RADIOLOGY & ADDITIONAL STUDIES REVIEWED       Assessment and Plan:  In summary,  VIANEY PETE JR is a 89y man with HTN, HLD, paroxsymal afib, sick sinus syndrome s/p PPM (2005 with generator change 2013), who presents to the ER for evaluation of elevated BP.    - The patient has been chest pain free since admission with negative initial troponin; nothing to suggest acute MI.  Negative nuclear stress test from 2018 noted with remote cath (2005) showing normal coronary arteries.   - Echocardiogram from 09/2021 reviewed: normal VL size and systolic function; mild-mod MR/TR.   - HTN: BP persistently elevated.  Discontinue metoprolol and start carvedilol 12.5 mg bid and add amlodipine 5 mg daily to current regimen.  Continue to monitor BP closely in the outpatient setting and will plan for early follow up in office.   - Paroxsymal afib with elevated CHADSVASC score maintained on eliquis which will be continued at this time.   - SSS s/p PPM: follow up with Dr. Alas as scheduled.   - Discussed with ER team.  No further inpatient cardiovascular workup indicated.     Thank you for allowing me to participate in the care of your patient.      Sincerely,    Logan Vidal M.D.

## 2022-08-24 NOTE — ED ADULT TRIAGE NOTE - NS ED NURSE BANDS TYPE
Add Option For Additional Mediation: No Administered By (Optional): sc Consent: The risks of atrophy were reviewed with the patient. Concentration (Mg/Ml): 40.0 Detail Level: None Concentration (Mg/Ml) Of Additional Medication: 2.5 Total Volume (Ccs): 2 Kenalog Preparation: kenalog Name band;

## 2022-09-17 ENCOUNTER — INPATIENT (INPATIENT)
Facility: HOSPITAL | Age: 87
LOS: 5 days | Discharge: NURSING, REHAB OR LONG-TERM CARE | DRG: 178 | End: 2022-09-23
Attending: INTERNAL MEDICINE | Admitting: INTERNAL MEDICINE
Payer: MEDICARE

## 2022-09-17 VITALS
DIASTOLIC BLOOD PRESSURE: 78 MMHG | OXYGEN SATURATION: 99 % | HEART RATE: 84 BPM | SYSTOLIC BLOOD PRESSURE: 144 MMHG | HEIGHT: 66 IN | WEIGHT: 179.9 LBS | RESPIRATION RATE: 16 BRPM | TEMPERATURE: 100 F

## 2022-09-17 DIAGNOSIS — Z90.79 ACQUIRED ABSENCE OF OTHER GENITAL ORGAN(S): Chronic | ICD-10-CM

## 2022-09-17 DIAGNOSIS — Z96.652 PRESENCE OF LEFT ARTIFICIAL KNEE JOINT: Chronic | ICD-10-CM

## 2022-09-17 DIAGNOSIS — Z98.890 OTHER SPECIFIED POSTPROCEDURAL STATES: Chronic | ICD-10-CM

## 2022-09-17 DIAGNOSIS — Z90.49 ACQUIRED ABSENCE OF OTHER SPECIFIED PARTS OF DIGESTIVE TRACT: Chronic | ICD-10-CM

## 2022-09-17 DIAGNOSIS — Z98.49 CATARACT EXTRACTION STATUS, UNSPECIFIED EYE: Chronic | ICD-10-CM

## 2022-09-17 DIAGNOSIS — Z95.0 PRESENCE OF CARDIAC PACEMAKER: Chronic | ICD-10-CM

## 2022-09-17 PROCEDURE — 99285 EMERGENCY DEPT VISIT HI MDM: CPT | Mod: CS

## 2022-09-17 NOTE — ED ADULT TRIAGE NOTE - HISTORY OF COVID-19 VACCINATION
Patient arrives with complaints of dizziness that occurred around 2pm lasting a few minutes and feeling that he was about to "pass out"  Denies pain at present but a mild sensation of dizziness 
Vaccine status unknown

## 2022-09-17 NOTE — ED ADULT TRIAGE NOTE - CHIEF COMPLAINT QUOTE
BIBEMS c/o mechanical fall after attempting to get into bed. Patient states "my legs just gave out". Patient A&Ox4 at triage, GCS 15, no neuro deficits appreciated at triage. Patient states experiencing bilateral leg/knee pain following fall. Patient takes PO Eliquis for pacemaker HX. No evidence of trauma/deformity noted at triage.

## 2022-09-18 DIAGNOSIS — I21.4 NON-ST ELEVATION (NSTEMI) MYOCARDIAL INFARCTION: ICD-10-CM

## 2022-09-18 LAB
ACANTHOCYTES BLD QL SMEAR: SLIGHT — SIGNIFICANT CHANGE UP
ALBUMIN SERPL ELPH-MCNC: 4 G/DL — SIGNIFICANT CHANGE UP (ref 3.3–5.2)
ALP SERPL-CCNC: 65 U/L — SIGNIFICANT CHANGE UP (ref 40–120)
ALT FLD-CCNC: 13 U/L — SIGNIFICANT CHANGE UP
ANION GAP SERPL CALC-SCNC: 13 MMOL/L — SIGNIFICANT CHANGE UP (ref 5–17)
ANISOCYTOSIS BLD QL: SLIGHT — SIGNIFICANT CHANGE UP
APPEARANCE UR: CLEAR — SIGNIFICANT CHANGE UP
AST SERPL-CCNC: 32 U/L — SIGNIFICANT CHANGE UP
B PERT DNA SPEC QL NAA+PROBE: SIGNIFICANT CHANGE UP
BACTERIA # UR AUTO: ABNORMAL
BASOPHILS # BLD AUTO: 0.06 K/UL — SIGNIFICANT CHANGE UP (ref 0–0.2)
BASOPHILS NFR BLD AUTO: 0.9 % — SIGNIFICANT CHANGE UP (ref 0–2)
BILIRUB SERPL-MCNC: 0.6 MG/DL — SIGNIFICANT CHANGE UP (ref 0.4–2)
BILIRUB UR-MCNC: NEGATIVE — SIGNIFICANT CHANGE UP
BUN SERPL-MCNC: 18.1 MG/DL — SIGNIFICANT CHANGE UP (ref 8–20)
C PNEUM DNA SPEC QL NAA+PROBE: SIGNIFICANT CHANGE UP
CALCIUM SERPL-MCNC: 9.3 MG/DL — SIGNIFICANT CHANGE UP (ref 8.4–10.5)
CHLORIDE SERPL-SCNC: 103 MMOL/L — SIGNIFICANT CHANGE UP (ref 98–107)
CK MB CFR SERPL CALC: 11.2 NG/ML — HIGH (ref 0–6.7)
CK SERPL-CCNC: 919 U/L — HIGH (ref 30–200)
CO2 SERPL-SCNC: 25 MMOL/L — SIGNIFICANT CHANGE UP (ref 22–29)
COLOR SPEC: YELLOW — SIGNIFICANT CHANGE UP
CREAT SERPL-MCNC: 1.08 MG/DL — SIGNIFICANT CHANGE UP (ref 0.5–1.3)
CRP SERPL-MCNC: 60 MG/L — HIGH
D DIMER BLD IA.RAPID-MCNC: 623 NG/ML DDU — HIGH
DIFF PNL FLD: ABNORMAL
EGFR: 65 ML/MIN/1.73M2 — SIGNIFICANT CHANGE UP
EOSINOPHIL # BLD AUTO: 0.06 K/UL — SIGNIFICANT CHANGE UP (ref 0–0.5)
EOSINOPHIL NFR BLD AUTO: 0.9 % — SIGNIFICANT CHANGE UP (ref 0–6)
EPI CELLS # UR: SIGNIFICANT CHANGE UP
FERRITIN SERPL-MCNC: 212 NG/ML — SIGNIFICANT CHANGE UP (ref 30–400)
FLUAV H1 2009 PAND RNA SPEC QL NAA+PROBE: SIGNIFICANT CHANGE UP
FLUAV H1 RNA SPEC QL NAA+PROBE: SIGNIFICANT CHANGE UP
FLUAV H3 RNA SPEC QL NAA+PROBE: SIGNIFICANT CHANGE UP
FLUAV SUBTYP SPEC NAA+PROBE: SIGNIFICANT CHANGE UP
FLUBV RNA SPEC QL NAA+PROBE: SIGNIFICANT CHANGE UP
GLUCOSE SERPL-MCNC: 121 MG/DL — HIGH (ref 70–99)
GLUCOSE UR QL: NEGATIVE MG/DL — SIGNIFICANT CHANGE UP
HADV DNA SPEC QL NAA+PROBE: SIGNIFICANT CHANGE UP
HCOV PNL SPEC NAA+PROBE: SIGNIFICANT CHANGE UP
HCT VFR BLD CALC: 31.8 % — LOW (ref 39–50)
HGB BLD-MCNC: 10.8 G/DL — LOW (ref 13–17)
HMPV RNA SPEC QL NAA+PROBE: SIGNIFICANT CHANGE UP
HPIV1 RNA SPEC QL NAA+PROBE: SIGNIFICANT CHANGE UP
HPIV2 RNA SPEC QL NAA+PROBE: SIGNIFICANT CHANGE UP
HPIV3 RNA SPEC QL NAA+PROBE: SIGNIFICANT CHANGE UP
HPIV4 RNA SPEC QL NAA+PROBE: SIGNIFICANT CHANGE UP
HYPOCHROMIA BLD QL: SLIGHT — SIGNIFICANT CHANGE UP
KETONES UR-MCNC: ABNORMAL
LACTATE BLDV-MCNC: 0.6 MMOL/L — SIGNIFICANT CHANGE UP (ref 0.5–2)
LDH SERPL L TO P-CCNC: 284 U/L — HIGH (ref 98–192)
LEUKOCYTE ESTERASE UR-ACNC: NEGATIVE — SIGNIFICANT CHANGE UP
LYMPHOCYTES # BLD AUTO: 0.06 K/UL — LOW (ref 1–3.3)
LYMPHOCYTES # BLD AUTO: 0.9 % — LOW (ref 13–44)
MAGNESIUM SERPL-MCNC: 1.8 MG/DL — SIGNIFICANT CHANGE UP (ref 1.6–2.6)
MANUAL SMEAR VERIFICATION: SIGNIFICANT CHANGE UP
MCHC RBC-ENTMCNC: 32.6 PG — SIGNIFICANT CHANGE UP (ref 27–34)
MCHC RBC-ENTMCNC: 34 GM/DL — SIGNIFICANT CHANGE UP (ref 32–36)
MCV RBC AUTO: 96.1 FL — SIGNIFICANT CHANGE UP (ref 80–100)
MONOCYTES # BLD AUTO: 0.47 K/UL — SIGNIFICANT CHANGE UP (ref 0–0.9)
MONOCYTES NFR BLD AUTO: 6.9 % — SIGNIFICANT CHANGE UP (ref 2–14)
NEUTROPHILS # BLD AUTO: 6.09 K/UL — SIGNIFICANT CHANGE UP (ref 1.8–7.4)
NEUTROPHILS NFR BLD AUTO: 88.7 % — HIGH (ref 43–77)
NITRITE UR-MCNC: NEGATIVE — SIGNIFICANT CHANGE UP
OVALOCYTES BLD QL SMEAR: SLIGHT — SIGNIFICANT CHANGE UP
PH UR: 6 — SIGNIFICANT CHANGE UP (ref 5–8)
PLAT MORPH BLD: NORMAL — SIGNIFICANT CHANGE UP
PLATELET # BLD AUTO: 157 K/UL — SIGNIFICANT CHANGE UP (ref 150–400)
POIKILOCYTOSIS BLD QL AUTO: SLIGHT — SIGNIFICANT CHANGE UP
POLYCHROMASIA BLD QL SMEAR: SLIGHT — SIGNIFICANT CHANGE UP
POTASSIUM SERPL-MCNC: 3.5 MMOL/L — SIGNIFICANT CHANGE UP (ref 3.5–5.3)
POTASSIUM SERPL-SCNC: 3.5 MMOL/L — SIGNIFICANT CHANGE UP (ref 3.5–5.3)
PROT SERPL-MCNC: 7 G/DL — SIGNIFICANT CHANGE UP (ref 6.6–8.7)
PROT UR-MCNC: 30 MG/DL
RAPID RVP RESULT: DETECTED
RBC # BLD: 3.31 M/UL — LOW (ref 4.2–5.8)
RBC # FLD: 13.7 % — SIGNIFICANT CHANGE UP (ref 10.3–14.5)
RBC BLD AUTO: ABNORMAL
RBC CASTS # UR COMP ASSIST: SIGNIFICANT CHANGE UP /HPF (ref 0–4)
RV+EV RNA SPEC QL NAA+PROBE: SIGNIFICANT CHANGE UP
SARS-COV-2 RNA SPEC QL NAA+PROBE: DETECTED
SCHISTOCYTES BLD QL AUTO: SLIGHT — SIGNIFICANT CHANGE UP
SODIUM SERPL-SCNC: 141 MMOL/L — SIGNIFICANT CHANGE UP (ref 135–145)
SP GR SPEC: 1.01 — SIGNIFICANT CHANGE UP (ref 1.01–1.02)
TROPONIN T SERPL-MCNC: 0.06 NG/ML — SIGNIFICANT CHANGE UP (ref 0–0.06)
TROPONIN T SERPL-MCNC: 0.08 NG/ML — HIGH (ref 0–0.06)
TROPONIN T SERPL-MCNC: 0.12 NG/ML — HIGH (ref 0–0.06)
UROBILINOGEN FLD QL: NEGATIVE MG/DL — SIGNIFICANT CHANGE UP
VARIANT LYMPHS # BLD: 1.7 % — SIGNIFICANT CHANGE UP (ref 0–6)
WBC # BLD: 6.87 K/UL — SIGNIFICANT CHANGE UP (ref 3.8–10.5)
WBC # FLD AUTO: 6.87 K/UL — SIGNIFICANT CHANGE UP (ref 3.8–10.5)
WBC UR QL: SIGNIFICANT CHANGE UP /HPF (ref 0–5)

## 2022-09-18 PROCEDURE — 93010 ELECTROCARDIOGRAM REPORT: CPT | Mod: 77

## 2022-09-18 PROCEDURE — 99223 1ST HOSP IP/OBS HIGH 75: CPT

## 2022-09-18 PROCEDURE — 73562 X-RAY EXAM OF KNEE 3: CPT | Mod: 26,50

## 2022-09-18 PROCEDURE — 71045 X-RAY EXAM CHEST 1 VIEW: CPT | Mod: 26

## 2022-09-18 PROCEDURE — 73522 X-RAY EXAM HIPS BI 3-4 VIEWS: CPT | Mod: 26

## 2022-09-18 PROCEDURE — 73552 X-RAY EXAM OF FEMUR 2/>: CPT | Mod: 26,LT

## 2022-09-18 RX ORDER — OMEGA-3 ACID ETHYL ESTERS 1 G
1 CAPSULE ORAL
Qty: 0 | Refills: 0 | DISCHARGE

## 2022-09-18 RX ORDER — CARVEDILOL PHOSPHATE 80 MG/1
12.5 CAPSULE, EXTENDED RELEASE ORAL EVERY 12 HOURS
Refills: 0 | Status: DISCONTINUED | OUTPATIENT
Start: 2022-09-18 | End: 2022-09-21

## 2022-09-18 RX ORDER — ASPIRIN/CALCIUM CARB/MAGNESIUM 324 MG
81 TABLET ORAL DAILY
Refills: 0 | Status: DISCONTINUED | OUTPATIENT
Start: 2022-09-19 | End: 2022-09-23

## 2022-09-18 RX ORDER — ENOXAPARIN SODIUM 100 MG/ML
40 INJECTION SUBCUTANEOUS EVERY 24 HOURS
Refills: 0 | Status: DISCONTINUED | OUTPATIENT
Start: 2022-09-18 | End: 2022-09-18

## 2022-09-18 RX ORDER — TAMSULOSIN HYDROCHLORIDE 0.4 MG/1
0.8 CAPSULE ORAL AT BEDTIME
Refills: 0 | Status: DISCONTINUED | OUTPATIENT
Start: 2022-09-18 | End: 2022-09-23

## 2022-09-18 RX ORDER — SODIUM CHLORIDE 9 MG/ML
1000 INJECTION INTRAMUSCULAR; INTRAVENOUS; SUBCUTANEOUS
Refills: 0 | Status: DISCONTINUED | OUTPATIENT
Start: 2022-09-18 | End: 2022-09-19

## 2022-09-18 RX ORDER — TAMSULOSIN HYDROCHLORIDE 0.4 MG/1
1 CAPSULE ORAL
Qty: 0 | Refills: 0 | DISCHARGE

## 2022-09-18 RX ORDER — PANTOPRAZOLE SODIUM 20 MG/1
40 TABLET, DELAYED RELEASE ORAL
Refills: 0 | Status: DISCONTINUED | OUTPATIENT
Start: 2022-09-18 | End: 2022-09-23

## 2022-09-18 RX ORDER — ATORVASTATIN CALCIUM 80 MG/1
40 TABLET, FILM COATED ORAL AT BEDTIME
Refills: 0 | Status: DISCONTINUED | OUTPATIENT
Start: 2022-09-18 | End: 2022-09-23

## 2022-09-18 RX ORDER — BRIMONIDINE TARTRATE 2 MG/MG
1 SOLUTION/ DROPS OPHTHALMIC
Qty: 0 | Refills: 0 | DISCHARGE

## 2022-09-18 RX ORDER — TAMSULOSIN HYDROCHLORIDE 0.4 MG/1
2 CAPSULE ORAL
Qty: 0 | Refills: 0 | DISCHARGE

## 2022-09-18 RX ORDER — SODIUM CHLORIDE 9 MG/ML
1000 INJECTION INTRAMUSCULAR; INTRAVENOUS; SUBCUTANEOUS ONCE
Refills: 0 | Status: COMPLETED | OUTPATIENT
Start: 2022-09-18 | End: 2022-09-18

## 2022-09-18 RX ORDER — LISINOPRIL 2.5 MG/1
40 TABLET ORAL DAILY
Refills: 0 | Status: DISCONTINUED | OUTPATIENT
Start: 2022-09-18 | End: 2022-09-23

## 2022-09-18 RX ORDER — POTASSIUM CHLORIDE 20 MEQ
0 PACKET (EA) ORAL
Qty: 0 | Refills: 0 | DISCHARGE

## 2022-09-18 RX ORDER — ACETAMINOPHEN 500 MG
650 TABLET ORAL EVERY 6 HOURS
Refills: 0 | Status: DISCONTINUED | OUTPATIENT
Start: 2022-09-18 | End: 2022-09-23

## 2022-09-18 RX ORDER — FUROSEMIDE 40 MG
1 TABLET ORAL
Qty: 0 | Refills: 0 | DISCHARGE

## 2022-09-18 RX ORDER — LISINOPRIL 2.5 MG/1
1 TABLET ORAL
Qty: 0 | Refills: 0 | DISCHARGE

## 2022-09-18 RX ORDER — ASPIRIN/CALCIUM CARB/MAGNESIUM 324 MG
324 TABLET ORAL ONCE
Refills: 0 | Status: COMPLETED | OUTPATIENT
Start: 2022-09-18 | End: 2022-09-18

## 2022-09-18 RX ORDER — APIXABAN 2.5 MG/1
5 TABLET, FILM COATED ORAL
Refills: 0 | Status: DISCONTINUED | OUTPATIENT
Start: 2022-09-18 | End: 2022-09-23

## 2022-09-18 RX ORDER — METOPROLOL TARTRATE 50 MG
1 TABLET ORAL
Qty: 0 | Refills: 0 | DISCHARGE

## 2022-09-18 RX ORDER — AMLODIPINE BESYLATE 2.5 MG/1
5 TABLET ORAL DAILY
Refills: 0 | Status: DISCONTINUED | OUTPATIENT
Start: 2022-09-18 | End: 2022-09-23

## 2022-09-18 RX ORDER — PANTOPRAZOLE SODIUM 20 MG/1
1 TABLET, DELAYED RELEASE ORAL
Qty: 0 | Refills: 0 | DISCHARGE

## 2022-09-18 RX ORDER — ACETAMINOPHEN 500 MG
650 TABLET ORAL ONCE
Refills: 0 | Status: COMPLETED | OUTPATIENT
Start: 2022-09-18 | End: 2022-09-18

## 2022-09-18 RX ADMIN — CARVEDILOL PHOSPHATE 12.5 MILLIGRAM(S): 80 CAPSULE, EXTENDED RELEASE ORAL at 17:54

## 2022-09-18 RX ADMIN — SODIUM CHLORIDE 125 MILLILITER(S): 9 INJECTION INTRAMUSCULAR; INTRAVENOUS; SUBCUTANEOUS at 10:00

## 2022-09-18 RX ADMIN — Medication 650 MILLIGRAM(S): at 04:13

## 2022-09-18 RX ADMIN — Medication 650 MILLIGRAM(S): at 10:35

## 2022-09-18 RX ADMIN — SODIUM CHLORIDE 1000 MILLILITER(S): 9 INJECTION INTRAMUSCULAR; INTRAVENOUS; SUBCUTANEOUS at 02:51

## 2022-09-18 RX ADMIN — Medication 324 MILLIGRAM(S): at 07:28

## 2022-09-18 RX ADMIN — Medication 650 MILLIGRAM(S): at 09:59

## 2022-09-18 RX ADMIN — ENOXAPARIN SODIUM 40 MILLIGRAM(S): 100 INJECTION SUBCUTANEOUS at 09:59

## 2022-09-18 RX ADMIN — ATORVASTATIN CALCIUM 40 MILLIGRAM(S): 80 TABLET, FILM COATED ORAL at 21:09

## 2022-09-18 RX ADMIN — Medication 650 MILLIGRAM(S): at 01:13

## 2022-09-18 RX ADMIN — SODIUM CHLORIDE 125 MILLILITER(S): 9 INJECTION INTRAMUSCULAR; INTRAVENOUS; SUBCUTANEOUS at 21:08

## 2022-09-18 RX ADMIN — SODIUM CHLORIDE 1000 MILLILITER(S): 9 INJECTION INTRAMUSCULAR; INTRAVENOUS; SUBCUTANEOUS at 04:13

## 2022-09-18 RX ADMIN — APIXABAN 5 MILLIGRAM(S): 2.5 TABLET, FILM COATED ORAL at 17:54

## 2022-09-18 RX ADMIN — TAMSULOSIN HYDROCHLORIDE 0.8 MILLIGRAM(S): 0.4 CAPSULE ORAL at 21:11

## 2022-09-18 NOTE — H&P ADULT - NSICDXPASTSURGICALHX_GEN_ALL_CORE_FT
PAST SURGICAL HISTORY:  H/O total knee replacement, left     History of esophageal surgery     Pacemaker [employer]    S/P cataract surgery     S/P colectomy 1992    S/P TURP

## 2022-09-18 NOTE — ED PROVIDER NOTE - WR ORDER ID 3
[facility]  Pulmonary/Critical Care Consult Note  [facility]  Report of Consultation    Vy Pham Patient Status:  Emergency    1969 MRN [id number]   Location [facility] Atte memory problems, trouble swallowing, speech problems, gait problems and weakness. : Denies dysuria, hematuria, urinary retention. Behavioral/Psych: Normal affect, mood, speech. No AVH, No SI/HI    All other review of systems are negative.     Vital sign PT, INR, PTT in the last 168 hours.     Other Labs:   COVID-19 Lab Results    COVID-19  Lab Results   Component Value Date    COVID19 Not Detected 04/05/2021       Pro-Calcitonin  No results found for: PCT    Inflammatory Markers  No results for input(s): C appreciate input  Start lasix, follow fluid balance, lytes, urine output  Check abdominal US  Follow POC glucoses; improved now post subcutaneous insulin earlier, continue iSS; if worsening, start insulin gtt  PPX: heparin gtt  Dispo: ICU    Thank you for [id number]

## 2022-09-18 NOTE — ED PROVIDER NOTE - OBJECTIVE STATEMENT
89 year old male with PMHx HTN, HLD, afib on Eliquis, sick sinus s/p pacemaker ([employer]), colon cancer, prostate cancer, left total knee replacement presents for evaluation s/p fall. Patient states he was trying to get into bed this evening but fell secondary to b/l knee stiffness. States he could not get up so laid on the floor and fell asleep for a few hours. Spoke to wife on the phone who states she found him laying on his right side, called 911 because she could not help him up. Currently only endorsing bilateral knee pain above knee caps.  Denies any head trauma, chest pain, difficulty breathing, LOC, dizziness.

## 2022-09-18 NOTE — ED PROVIDER NOTE - ATTENDING CONTRIBUTION TO CARE
89 yo M presents to ED via EMS after reported falling onto his knees and was unable to get up for "hours" until his wife found him and called 911.  Pt with significant cardiac hx including PPM, A-fib, HTN and HLD.  Pt c/o knee pain  and denies hitting his head.  Pt does not believe he ate dinner.  On exam elderly M appears uncomfortable, NC/AT PERRL, Neck supple, Co Reg, Lungs clear b/l, Abd soft, NT, Ext no obvious deformity, NVI, L knee + pain on AROM, Neuro no focal deficits. will check labs, CPK, x-rays and re-eval  after meds

## 2022-09-18 NOTE — ED PROVIDER NOTE - CLINICAL SUMMARY MEDICAL DECISION MAKING FREE TEXT BOX
Patient clinically appears dry, will give IVF. Tylenol for pain control. Labs. X-ray LEs r/o fracture. Patient clinically appears dry, will give IVF. Tylenol for pain control. Labs. X-ray LEs - no fracture.     Labs reviewed - trop elevated to 0.12. .   EKG paced in 60s.   Aspirin 324mg given.     Patient developed fever in ED, 100.5F. Cultures, UA chest x-ray ordered.     Reviewed all results with patient as well as plans for admission. Patient is comfortable with plan for admission. Questions answered. Patient clinically appears dry, will give IVF. Tylenol for pain control. Labs. X-ray LEs - no fracture.     Labs reviewed - trop elevated to 0.12. .   EKG paced in 60s.   Aspirin 324mg given. Cardiology consulted.     Patient developed fever in ED, 100.5F. Cultures, UA chest x-ray ordered.     Reviewed all results with patient as well as plans for admission. Patient is comfortable with plan for admission. Questions answered.

## 2022-09-18 NOTE — PROVIDER CONTACT NOTE (OTHER) - RECOMMENDATIONS
continue to monitor. troponin trending down. will follow cardio recommendations and patient is paced with vital signs stable.

## 2022-09-18 NOTE — ED ADULT NURSE NOTE - NSICDXPASTSURGICALHX_GEN_ALL_CORE_FT
PAST SURGICAL HISTORY:  H/O total knee replacement, left     History of esophageal surgery     Pacemaker [employer]    S/P cataract surgery     S/P colectomy 1992    S/P TURP

## 2022-09-18 NOTE — ED PROVIDER NOTE - WR ORDER NAME 1
Dr~    Please advise on attached refill.  It has NOT been prescribed you yet.   Xray Femur 2 Views, Left

## 2022-09-18 NOTE — ED PROVIDER NOTE - NS ED ROS FT
Gen: No fever, normal PO intake, no weight changes  Eyes: No eye irritation or discharge  ENT: No ear pain, no congestion, no rhinorrhea, no sore throat  Resp: No cough, no trouble breathing  Cardiovascular: No chest pain, no palpitation  Gastrointestinal: No nausea, no vomiting, no diarrhea, no constipation  :  No change in urine output; no dysuria, no hematuria  MS: (+) b/l knee pain  Skin: No rashes  Neuro: No headache; no abnormal movements  Remainder negative, except as per the HPI

## 2022-09-18 NOTE — H&P ADULT - ASSESSMENT
89y man with HTN, HLD, paroxsymal Afib on Eliquis, sick sinus syndrome s/p PPM (2005 with generator change 2013), Prostate cancer s/p TURP, Colon Ca s/p colectomy 1992,  presents for evaluation s/p fall. Patient states he was trying to get into bed this evening but fell secondary to b/l knee stiffness. States he could not get up so lay on the floor and fell asleep for a few hours. Per wife- she found him laying on his right side.    # NSTEMI  No acute EKG changes  Trend Trops  Cards consulted by ED  ECHO  ASA, Statins    # U blood +, elevated CPK, fall = Rhabdomyolysis  IVF  renal fxn stable    # low grade temp  can be likely sec to dehydration vs fall vs pain  IVF  However c/s sent from ED  UA not indicative of UTI  CXR no infectious process    # HTN, HLD  Resume home meds    # p A fib, s/p PPM  Cont Eliquis  AV paced rhythm    # Leg weakness  Xrays images reviewed  Await official read    Lovenox 89y man with HTN, HLD, paroxsymal Afib on Eliquis, sick sinus syndrome s/p PPM (2005 with generator change 2013), Prostate cancer s/p TURP, Colon Ca s/p colectomy 1992,  presents for evaluation s/p fall. Patient states he was trying to get into bed this evening but fell secondary to b/l knee stiffness. States he could not get up so lay on the floor and fell asleep for a few hours. Per wife- she found him laying on his right side.    # NSTEMI  No acute EKG changes  Trend Trops  Cards consulted by ED  ECHO  ASA, Statins    # U blood +, elevated CPK, fall = Rhabdomyolysis  IVF  renal fxn stable    # Urine ketones +  dehydration vs being on diuretics  hold Lasix x 24 hrs    # low grade temp  can be likely sec to dehydration vs fall vs pain  IVF  However c/s sent from ED  UA not indicative of UTI  CXR no infectious process    # HTN, HLD  Resume home meds    # p A fib, s/p PPM  Cont Eliquis  AV paced rhythm    # Leg weakness  Xrays images reviewed  Await official read    # COVID resulted +  presenting findings of weakness/ low grade temp etc likely related to this  check markers  symptomatic treatment  isolation  no resp symptoms or hypoxia      Lovenox 89y man with HTN, HLD, paroxsymal Afib on Eliquis, sick sinus syndrome s/p PPM (2005 with generator change 2013), Prostate cancer s/p TURP, Colon Ca s/p colectomy 1992,  presents for evaluation s/p fall. Patient states he was trying to get into bed this evening but fell secondary to b/l knee stiffness. States he could not get up so lay on the floor and fell asleep for a few hours. Per wife- she found him laying on his right side.    # NSTEMI  No acute EKG changes  Trend Trops  Cards consulted by ED  ECHO  ASA, Statins    # U blood +, elevated CPK, fall = Rhabdomyolysis  IVF  renal fxn stable    # Urine ketones +  dehydration vs being on diuretics  hold Lasix x 24 hrs    # low grade temp  can be likely sec to dehydration vs fall vs pain  IVF  However c/s sent from ED  UA not indicative of UTI  CXR no infectious process    # HTN, HLD  Resume home meds    # p A fib, s/p PPM  Cont Eliquis  AV paced rhythm  not on any AVN blockers at home    # Leg weakness  Xrays images reviewed  Await official read    # COVID resulted +  presenting findings of weakness/ low grade temp etc likely related to this  check markers  symptomatic treatment  isolation  no resp symptoms or hypoxia    # unclear reason why patient is on prednisone 5mg prescribed by Dr Ferrara o/p  cont same in order to avoid cortisol insuff    Eliquis

## 2022-09-18 NOTE — H&P ADULT - HISTORY OF PRESENT ILLNESS
89y man with HTN, HLD, paroxsymal Afib on Eliquis, sick sinus syndrome s/p PPM (2005 with generator change 2013), Prostate cancer s/p TURP, Colon Ca s/p colectomy 1992,  presents for evaluation s/p fall. Patient states he was trying to get into bed this evening but fell secondary to b/l knee stiffness. States he could not get up so lay on the floor and fell asleep for a few hours. per wife- she found him laying on his right side, called 911 because she could not help him up. Currently only endorsing bilateral knee pain. Denies any head trauma, chest pain, difficulty breathing, LOC, dizziness.      FH: throat cancer (Sibling); colon cancer  SH: Denies toxic habits, lives with spouse

## 2022-09-18 NOTE — ED PROVIDER NOTE - PHYSICAL EXAMINATION
Gen: well appearing, no acute distress  Head: normocephalic, atraumatic  EENT: EOMI, PERRLA, neck supple, dry mucous membranes, no scleral icterus  Lung: no increased work of breathing, clear to auscultation bilaterally, no wheezing, rales, rhonchi, speaking in full sentences  CV: regular rate, regular rhythm, normal s1/s2, 2+ radial pulses bilaterally  Abd: soft, non-tender, non-distended, no rebound tenderness or guarding; no CVA tenderness  MSK: No edema, no visible deformities, full range of motion in all 4 extremities. Left leg: limited range of motion of knee secondary to pain. Able to bend right knee.   Neuro: Awake, alert, no focal neurologic deficits  Psych: normal affect, normal speech

## 2022-09-18 NOTE — ED ADULT NURSE REASSESSMENT NOTE - NS ED NURSE REASSESS COMMENT FT1
Pt received from EBER Marie  and assumed care @ 0730 hrs.   Patient found in a Semi fowlers position c/o weakness.  Pt unable to ambulate or sit up.  Pt was admitted with a dx of Non-Stemi.  Pt placed on a telebox. Denies any pain at this time.  Pt skin is warm to to the touch and provided urine that was sent tot he lab.  Bilateral  Lung sounds are clear   IV is in place  20 gauge in in right AC Vital signs within normal limits.  Plan of care explained and reviewed with patient, call bell within reach.

## 2022-09-18 NOTE — H&P ADULT - NSHPPHYSICALEXAM_GEN_ALL_CORE
Vital Signs Last 24 Hrs  T(C): 38.1 (09-18-22 @ 08:02), Max: 38.1 (09-18-22 @ 04:46)  T(F): 100.6 (09-18-22 @ 08:02), Max: 100.6 (09-18-22 @ 08:02)  HR: 72 (09-18-22 @ 08:02) (64 - 84)  BP: 135/74 (09-18-22 @ 08:02) (116/72 - 144/78)  BP(mean): --  RR: 18 (09-18-22 @ 08:02) (16 - 20)  SpO2: 98% (09-18-22 @ 08:02) (94% - 99%)    Gen: well appearing, no acute distress  Head: normocephalic, atraumatic  EENT: EOMI, PERRLA, neck supple, dry mucous membranes, no scleral icterus  Lung: no increased work of breathing, clear to auscultation bilaterally, no wheezing, rales, rhonchi, speaking in full sentences  CV: regular rate, regular rhythm, normal s1/s2, 2+ radial pulses bilaterally  Abd: soft, non-tender, non-distended, no rebound tenderness or guarding; no CVA tenderness  MSK: No edema, no visible deformities, full range of motion in all 4 extremities. Left leg: limited range of motion of knee secondary to pain. Able to bend right knee.   Neuro: Awake, alert, no focal neurologic deficits  Psych: normal affect, normal speech Vital Signs Last 24 Hrs  T(C): 38.1 (09-18-22 @ 08:02), Max: 38.1 (09-18-22 @ 04:46)  T(F): 100.6 (09-18-22 @ 08:02), Max: 100.6 (09-18-22 @ 08:02)  HR: 72 (09-18-22 @ 08:02) (64 - 84)  BP: 135/74 (09-18-22 @ 08:02) (116/72 - 144/78)  BP(mean): --  RR: 18 (09-18-22 @ 08:02) (16 - 20)  SpO2: 98% (09-18-22 @ 08:02) (94% - 99%)    Gen: well appearing, no acute distress  Head: normocephalic, atraumatic  EENT: EOMI, PERRLA, neck supple, dry mucous membranes, no scleral icterus  Lung: no increased work of breathing, clear to auscultation bilaterally, no wheezing, rales, rhonchi, speaking in full sentences  CV: regular rate, regular rhythm, normal s1/s2, 2+ radial pulses bilaterally  Abd: soft, non-tender, non-distended, no rebound tenderness or guarding; no CVA tenderness  MSK: No edema, no visible deformities, full range of motion in all 4 extremities. able to flex both knees, but states 'difficult' as it feels heavy, no focal tenderness/ edema/ warmth  Neuro: Awake, alert, no focal neurologic deficits  Psych: normal affect, normal speech

## 2022-09-18 NOTE — CONSULT NOTE ADULT - SUBJECTIVE AND OBJECTIVE BOX
CHIEF COMPLAINT: fall     HPI: Patient is a  89y Male with mild non obstructive CAD, PPM for SSS in 2005 with generator change in 2015, PAF on eliquis, HTN, HLD, anemia and prior GIB with polyp resection  here with fall.     PAST MEDICAL & SURGICAL HISTORY:  Hypertension      Irregular heart rate      Pacemaker  [employer]      Bradycardia      Glaucoma      Prostate cancer      Colon cancer      S/P colectomy  1992      H/O total knee replacement, left      Pacemaker  [employer] [employer]      S/P TURP      S/P cataract surgery      History of esophageal surgery          MEDICATIONS:  MEDICATIONS  (STANDING):  amLODIPine   Tablet 5 milliGRAM(s) Oral daily  apixaban 5 milliGRAM(s) Oral two times a day  carvedilol 12.5 milliGRAM(s) Oral every 12 hours  lisinopril 40 milliGRAM(s) Oral daily  pantoprazole    Tablet 40 milliGRAM(s) Oral before breakfast  predniSONE   Tablet 5 milliGRAM(s) Oral daily  sodium chloride 0.9%. 1000 milliLiter(s) (125 mL/Hr) IV Continuous <Continuous>  tamsulosin 0.8 milliGRAM(s) Oral at bedtime    MEDICATIONS  (PRN):  acetaminophen     Tablet .. 650 milliGRAM(s) Oral every 6 hours PRN Temp greater or equal to 38C (100.4F), Mild Pain (1 - 3)    acetaminophen     Tablet .. 650 milliGRAM(s) Oral every 6 hours PRN  amLODIPine   Tablet 5 milliGRAM(s) Oral daily  apixaban 5 milliGRAM(s) Oral two times a day  carvedilol 12.5 milliGRAM(s) Oral every 12 hours  lisinopril 40 milliGRAM(s) Oral daily  pantoprazole    Tablet 40 milliGRAM(s) Oral before breakfast  predniSONE   Tablet 5 milliGRAM(s) Oral daily  sodium chloride 0.9%. 1000 milliLiter(s) IV Continuous <Continuous>  tamsulosin 0.8 milliGRAM(s) Oral at bedtime      FAMILY HISTORY:  FAMILY HISTORY:  Family hx of colon cancer    FH: throat cancer (Sibling)        SOCIAL HISTORY: no EtOH, drugs or tobacco    ROS: GI negative, All others negative    PHYSCIAL EXAM:  Vital Signs Last 24 Hrs  T(C): 37.3 (18 Sep 2022 11:08), Max: 38.1 (18 Sep 2022 04:46)  T(F): 99.1 (18 Sep 2022 11:08), Max: 100.6 (18 Sep 2022 08:02)  HR: 74 (18 Sep 2022 11:33) (64 - 84)  BP: 116/80 (18 Sep 2022 11:33) (116/72 - 157/77)  BP(mean): --  RR: 20 (18 Sep 2022 11:33) (16 - 20)  SpO2: 98% (18 Sep 2022 11:33) (94% - 99%)    Parameters below as of 18 Sep 2022 11:33  Patient On (Oxygen Delivery Method): room air      I&O's Summary    GEN: elderly M in NAD  HEENT: MMM, sclera anicteric  RESP: CTA bilaterally  CVS: S1S2, RRR, no JVD, no M/R/G  GI: Soft, NT, ND, BS+  EXT: no C/C/E  NEURO: AAOX3  PSYCH: Normal affect    ECG:    LABS:                        10.8   6.87  )-----------( 157      ( 18 Sep 2022 03:02 )             31.8     09-18    141  |  103  |  18.1  ----------------------------<  121<H>  3.5   |  25.0  |  1.08    Ca    9.3      18 Sep 2022 03:02  Mg     1.8     09-18    TPro  7.0  /  Alb  4.0  /  TBili  0.6  /  DBili  x   /  AST  32  /  ALT  13  /  AlkPhos  65  09-18    CARDIAC MARKERS ( 18 Sep 2022 03:02 )  x     / 0.12 ng/mL / 919 U/L / x     / 11.2 ng/mL      ECG: AV paced rhythm      RADIOLOGY & ADDITIONAL STUDIES:    Assessment:    Patient is a  89y Male with mild non obstructive CAD, PPM for SSS in 2005 with generator change in 2015, PAF on eliquis, HTN, HLD, anemia and prior GIB with polyp resection  here with fall and low level positive trops from mild demand ischemia  -Also COVID positive  -No CHF and A-V paced on ECG      Plan: (INCOMPLETE, TO BE SEEN)  1. Trend biomarkers, obtain echo  2. Continue home CV meds  3. Management COVId per primary team  4. Hydration   5. will follow, thank you      Lucio Hameed MD CHIEF COMPLAINT: fall     HPI: Patient is a  89y Male with mild non obstructive CAD, PPM for SSS in 2005 with generator change in 2015, PAF on eliquis, HTN, HLD, anemia and prior GIB with polyp resection  here with fall. Was in usual state of health recently. Had fallen and arm stuck. couldnt move legs or get up and was calling his wife. Denied CP/palps/syncope prior. Has noted a cough but no fevers/chills/sweats. no dyspnea and no pnd/orthopnea/palps/syncope/edema. Still with leg weakness and pain now.     PAST MEDICAL & SURGICAL HISTORY:  Hypertension      Irregular heart rate      Pacemaker  [employer]      Bradycardia      Glaucoma      Prostate cancer      Colon cancer      S/P colectomy  1992      H/O total knee replacement, left      Pacemaker  [employer]      S/P TURP      S/P cataract surgery      History of esophageal surgery          MEDICATIONS:  MEDICATIONS  (STANDING):  amLODIPine   Tablet 5 milliGRAM(s) Oral daily  apixaban 5 milliGRAM(s) Oral two times a day  carvedilol 12.5 milliGRAM(s) Oral every 12 hours  lisinopril 40 milliGRAM(s) Oral daily  pantoprazole    Tablet 40 milliGRAM(s) Oral before breakfast  predniSONE   Tablet 5 milliGRAM(s) Oral daily  sodium chloride 0.9%. 1000 milliLiter(s) (125 mL/Hr) IV Continuous <Continuous>  tamsulosin 0.8 milliGRAM(s) Oral at bedtime    MEDICATIONS  (PRN):  acetaminophen     Tablet .. 650 milliGRAM(s) Oral every 6 hours PRN Temp greater or equal to 38C (100.4F), Mild Pain (1 - 3)    acetaminophen     Tablet .. 650 milliGRAM(s) Oral every 6 hours PRN  amLODIPine   Tablet 5 milliGRAM(s) Oral daily  apixaban 5 milliGRAM(s) Oral two times a day  carvedilol 12.5 milliGRAM(s) Oral every 12 hours  lisinopril 40 milliGRAM(s) Oral daily  pantoprazole    Tablet 40 milliGRAM(s) Oral before breakfast  predniSONE   Tablet 5 milliGRAM(s) Oral daily  sodium chloride 0.9%. 1000 milliLiter(s) IV Continuous <Continuous>  tamsulosin 0.8 milliGRAM(s) Oral at bedtime      FAMILY HISTORY:  FAMILY HISTORY:  Family hx of colon cancer    FH: throat cancer (Sibling)        SOCIAL HISTORY: no EtOH, drugs or tobacco    ROS: GI negative, All others negative    PHYSCIAL EXAM:  Vital Signs Last 24 Hrs  T(C): 37.3 (18 Sep 2022 11:08), Max: 38.1 (18 Sep 2022 04:46)  T(F): 99.1 (18 Sep 2022 11:08), Max: 100.6 (18 Sep 2022 08:02)  HR: 74 (18 Sep 2022 11:33) (64 - 84)  BP: 116/80 (18 Sep 2022 11:33) (116/72 - 157/77)  BP(mean): --  RR: 20 (18 Sep 2022 11:33) (16 - 20)  SpO2: 98% (18 Sep 2022 11:33) (94% - 99%)    Parameters below as of 18 Sep 2022 11:33  Patient On (Oxygen Delivery Method): room air      I&O's Summary    GEN: elderly M in NAD  HEENT: MMM, sclera anicteric  RESP: CTA bilaterally  CVS: S1S2, RRR, no JVD, no M/R/G  GI: Soft, NT, ND, BS+  EXT: no C/C/E  NEURO: AAOX3  PSYCH: Normal affect      LABS:                        10.8   6.87  )-----------( 157      ( 18 Sep 2022 03:02 )             31.8     09-18    141  |  103  |  18.1  ----------------------------<  121<H>  3.5   |  25.0  |  1.08    Ca    9.3      18 Sep 2022 03:02  Mg     1.8     09-18    TPro  7.0  /  Alb  4.0  /  TBili  0.6  /  DBili  x   /  AST  32  /  ALT  13  /  AlkPhos  65  09-18    CARDIAC MARKERS ( 18 Sep 2022 03:02 )  x     / 0.12 ng/mL / 919 U/L / x     / 11.2 ng/mL      ECG: AV paced rhythm      RADIOLOGY & ADDITIONAL STUDIES:    Assessment:    Patient is a  89y Male with mild non obstructive CAD, PPM for SSS in 2005 with generator change in 2015, PAF on eliquis, HTN, HLD, anemia and prior GIB with polyp resection  here with fall and low level positive trops from mild demand ischemia with mild dehydration and COVID  -Hemodynamically stable and not hypoxic or SOB at rest  -No CHF and A-V paced on ECG, in AF on monitor rate controlled       Plan:  1. Trend biomarkers, obtain echo  2. Continue home CV meds  3. Management COVId per primary team  4. Hydration   5. thank you!       Lucio Hameed MD

## 2022-09-19 LAB
ALBUMIN SERPL ELPH-MCNC: 3.1 G/DL — LOW (ref 3.3–5.2)
ALP SERPL-CCNC: 51 U/L — SIGNIFICANT CHANGE UP (ref 40–120)
ALT FLD-CCNC: 18 U/L — SIGNIFICANT CHANGE UP
ANION GAP SERPL CALC-SCNC: 14 MMOL/L — SIGNIFICANT CHANGE UP (ref 5–17)
AST SERPL-CCNC: 37 U/L — SIGNIFICANT CHANGE UP
BILIRUB DIRECT SERPL-MCNC: 0.1 MG/DL — SIGNIFICANT CHANGE UP (ref 0–0.3)
BILIRUB INDIRECT FLD-MCNC: 0.2 MG/DL — SIGNIFICANT CHANGE UP (ref 0.2–1)
BILIRUB SERPL-MCNC: 0.3 MG/DL — LOW (ref 0.4–2)
BUN SERPL-MCNC: 17.8 MG/DL — SIGNIFICANT CHANGE UP (ref 8–20)
CALCIUM SERPL-MCNC: 8.3 MG/DL — LOW (ref 8.4–10.5)
CHLORIDE SERPL-SCNC: 103 MMOL/L — SIGNIFICANT CHANGE UP (ref 98–107)
CHOLEST SERPL-MCNC: 193 MG/DL — SIGNIFICANT CHANGE UP
CK MB CFR SERPL CALC: 3.5 NG/ML — SIGNIFICANT CHANGE UP (ref 0–6.7)
CK SERPL-CCNC: 1112 U/L — HIGH (ref 30–200)
CO2 SERPL-SCNC: 21 MMOL/L — LOW (ref 22–29)
CREAT SERPL-MCNC: 0.91 MG/DL — SIGNIFICANT CHANGE UP (ref 0.5–1.3)
CREAT SERPL-MCNC: 0.94 MG/DL — SIGNIFICANT CHANGE UP (ref 0.5–1.3)
EGFR: 77 ML/MIN/1.73M2 — SIGNIFICANT CHANGE UP
EGFR: 80 ML/MIN/1.73M2 — SIGNIFICANT CHANGE UP
GLUCOSE SERPL-MCNC: 134 MG/DL — HIGH (ref 70–99)
HDLC SERPL-MCNC: 58 MG/DL — SIGNIFICANT CHANGE UP
INR BLD: 1.85 RATIO — HIGH (ref 0.88–1.16)
LIPID PNL WITH DIRECT LDL SERPL: 121 MG/DL — HIGH
NON HDL CHOLESTEROL: 135 MG/DL — HIGH
POTASSIUM SERPL-MCNC: 3.2 MMOL/L — LOW (ref 3.5–5.3)
POTASSIUM SERPL-SCNC: 3.2 MMOL/L — LOW (ref 3.5–5.3)
PROT SERPL-MCNC: 6.3 G/DL — LOW (ref 6.6–8.7)
PROTHROM AB SERPL-ACNC: 21.6 SEC — HIGH (ref 10.5–13.4)
SODIUM SERPL-SCNC: 138 MMOL/L — SIGNIFICANT CHANGE UP (ref 135–145)
TRIGL SERPL-MCNC: 72 MG/DL — SIGNIFICANT CHANGE UP
TROPONIN T SERPL-MCNC: 0.1 NG/ML — HIGH (ref 0–0.06)

## 2022-09-19 PROCEDURE — 99233 SBSQ HOSP IP/OBS HIGH 50: CPT

## 2022-09-19 PROCEDURE — 99232 SBSQ HOSP IP/OBS MODERATE 35: CPT

## 2022-09-19 PROCEDURE — 70450 CT HEAD/BRAIN W/O DYE: CPT | Mod: 26

## 2022-09-19 PROCEDURE — 99222 1ST HOSP IP/OBS MODERATE 55: CPT

## 2022-09-19 PROCEDURE — 93970 EXTREMITY STUDY: CPT | Mod: 26

## 2022-09-19 RX ORDER — REMDESIVIR 5 MG/ML
200 INJECTION INTRAVENOUS EVERY 24 HOURS
Refills: 0 | Status: COMPLETED | OUTPATIENT
Start: 2022-09-19 | End: 2022-09-19

## 2022-09-19 RX ORDER — POTASSIUM CHLORIDE 20 MEQ
40 PACKET (EA) ORAL ONCE
Refills: 0 | Status: COMPLETED | OUTPATIENT
Start: 2022-09-19 | End: 2022-09-19

## 2022-09-19 RX ORDER — REMDESIVIR 5 MG/ML
100 INJECTION INTRAVENOUS EVERY 24 HOURS
Refills: 0 | Status: COMPLETED | OUTPATIENT
Start: 2022-09-20 | End: 2022-09-21

## 2022-09-19 RX ORDER — REMDESIVIR 5 MG/ML
INJECTION INTRAVENOUS
Refills: 0 | Status: COMPLETED | OUTPATIENT
Start: 2022-09-19 | End: 2022-09-21

## 2022-09-19 RX ADMIN — CARVEDILOL PHOSPHATE 12.5 MILLIGRAM(S): 80 CAPSULE, EXTENDED RELEASE ORAL at 18:50

## 2022-09-19 RX ADMIN — Medication 40 MILLIEQUIVALENT(S): at 13:26

## 2022-09-19 RX ADMIN — Medication 5 MILLIGRAM(S): at 07:03

## 2022-09-19 RX ADMIN — PANTOPRAZOLE SODIUM 40 MILLIGRAM(S): 20 TABLET, DELAYED RELEASE ORAL at 07:04

## 2022-09-19 RX ADMIN — Medication 650 MILLIGRAM(S): at 13:31

## 2022-09-19 RX ADMIN — LISINOPRIL 40 MILLIGRAM(S): 2.5 TABLET ORAL at 07:03

## 2022-09-19 RX ADMIN — Medication 375 MILLIGRAM(S): at 22:20

## 2022-09-19 RX ADMIN — Medication 81 MILLIGRAM(S): at 13:26

## 2022-09-19 RX ADMIN — TAMSULOSIN HYDROCHLORIDE 0.8 MILLIGRAM(S): 0.4 CAPSULE ORAL at 22:20

## 2022-09-19 RX ADMIN — AMLODIPINE BESYLATE 5 MILLIGRAM(S): 2.5 TABLET ORAL at 07:04

## 2022-09-19 RX ADMIN — Medication 650 MILLIGRAM(S): at 07:05

## 2022-09-19 RX ADMIN — Medication 650 MILLIGRAM(S): at 19:53

## 2022-09-19 RX ADMIN — SODIUM CHLORIDE 125 MILLILITER(S): 9 INJECTION INTRAMUSCULAR; INTRAVENOUS; SUBCUTANEOUS at 08:49

## 2022-09-19 RX ADMIN — ATORVASTATIN CALCIUM 40 MILLIGRAM(S): 80 TABLET, FILM COATED ORAL at 22:20

## 2022-09-19 RX ADMIN — Medication 650 MILLIGRAM(S): at 20:23

## 2022-09-19 RX ADMIN — APIXABAN 5 MILLIGRAM(S): 2.5 TABLET, FILM COATED ORAL at 18:50

## 2022-09-19 RX ADMIN — Medication 650 MILLIGRAM(S): at 14:15

## 2022-09-19 RX ADMIN — REMDESIVIR 500 MILLIGRAM(S): 5 INJECTION INTRAVENOUS at 22:20

## 2022-09-19 RX ADMIN — CARVEDILOL PHOSPHATE 12.5 MILLIGRAM(S): 80 CAPSULE, EXTENDED RELEASE ORAL at 07:02

## 2022-09-19 RX ADMIN — APIXABAN 5 MILLIGRAM(S): 2.5 TABLET, FILM COATED ORAL at 07:01

## 2022-09-19 RX ADMIN — Medication 375 MILLIGRAM(S): at 22:50

## 2022-09-19 NOTE — CONSULT NOTE ADULT - ASSESSMENT
89y man with HTN, HLD, paroxsymal Afib on Eliquis, sick sinus syndrome s/p PPM (2005 with generator change 2013), Prostate cancer s/p TURP, Colon Ca s/p colectomy 1992,  presents for evaluation s/p fall and unable to get up due to weakness. Admitted for dehydration, rhabdomyolysis, NSTEMI. Found to be COVID +  Reports vaccination x 4    COVID 19 + infection  s/p Fall  Rhabdomyolysis  NSTEMI    - pt reports weakness, has dry cough and febrile today. COVID +  - UA (-), CXR no pneumonia  - suggest Remdesivir 200 mg x 1 and then 100 mg daily x2 days. would avoid paxlovid due to DDI with apixaban  - add decadron if hypoxic, and extend course up to 5 days  - d dimer elevated but normal if adjusted for age. not hypoxic  - Avoid antibiotics unless there is a concern for a bacterial infection or up trending procalcitonin  - VTE prophylaxis per current [facility] COVID 19 protocol-on eliquis  - Check CBC with diff, CMP with LFT's daily, Inflammatory markers, D dimer, procalcitonin q48h  - Encourage self proning q2h, incentive spirometry and ambulation as tolerated  - Inpatient Contact/Airborne isolation         please call with questions

## 2022-09-19 NOTE — PROGRESS NOTE ADULT - ASSESSMENT
89y man with HTN, HLD, paroxsymal Afib on Eliquis, sick sinus syndrome s/p PPM (2005 with generator change 2013), Prostate cancer s/p TURP, Colon Ca s/p colectomy 1992,  presents for evaluation s/p fall. Patient states he was trying to get into bed this evening but fell secondary to b/l knee stiffness. States he could not get up so lay on the floor and fell asleep for a few hours. Per wife- she found him laying on his right side.    # NSTEMI  No acute EKG changes  Trend Trops  Cards consulted by ED  ECHO  ASA, Statins    # U blood +, elevated CPK, fall = Rhabdomyolysis  IVF  renal fxn stable    # Urine ketones +  dehydration vs being on diuretics  hold Lasix x 24 hrs    # low grade temp  can be likely sec to dehydration vs fall vs pain  IVF  However c/s sent from ED  UA not indicative of UTI  CXR no infectious process  ID consulted    # HTN, HLD  Resume home meds    # p A fib, s/p PPM  Cont Eliquis  AV paced rhythm  not on any AVN blockers at home    # Leg weakness  Xrays images reviewed  Await official read    # COVID resulted +  presenting findings of weakness/ low grade temp etc likely related to this  check markers  symptomatic treatment  isolation  no resp symptoms or hypoxia    # unclear reason why patient is on prednisone 5mg prescribed by Dr Ferrara o/p  cont same in order to avoid cortisol insuff    Eliquis 89y man with HTN, HLD, paroxsymal Afib on Eliquis, sick sinus syndrome s/p PPM (2005 with generator change 2013), Prostate cancer s/p TURP, Colon Ca s/p colectomy 1992,  presents for evaluation s/p fall. Patient states he was trying to get into bed this evening but fell secondary to b/l knee stiffness. States he could not get up so lay on the floor and fell asleep for a few hours. Per wife- she found him laying on his right side.      Fall, due to weakness.    Elevated Troponin due to fall or leak; doubt NSTEMi  COVID-19 infection  - Monitor  - ASA, Statin, BB  - monitor for worsening clinically  - ID consulted    Rhabdomyolysis  - Monitor CPK  - IVF locked due to age and concern for overload  - Naproxen ordered for pain    # Urine ketones +  dehydration vs being on diuretics  hold Lasix x 24 hrs    # HTN, HLD  Resume home meds (hold diuretics for now    # p A fib, s/p PPM  Cont Eliquis  AV paced rhythm  not on any AVN blockers at home    # Leg weakness  Xrays images reviewed  Await official read    # COVID resulted +  presenting findings of weakness/ low grade temp etc likely related to this  check markers  symptomatic treatment  isolation  no resp symptoms or hypoxia    # unclear reason why patient is on prednisone 5mg prescribed by Dr Ferrara o/p  cont same in order to avoid cortisol insuff    Eliquis    Discussed with daughter at bedside.

## 2022-09-19 NOTE — PATIENT PROFILE ADULT - FALL HARM RISK - HARM RISK INTERVENTIONS
Assistance with ambulation/Assistance OOB with selected safe patient handling equipment/Communicate Risk of Fall with Harm to all staff/Discuss with provider need for PT consult/Monitor gait and stability/Reinforce activity limits and safety measures with patient and family/Tailored Fall Risk Interventions/Visual Cue: Yellow wristband and red socks/Bed in lowest position, wheels locked, appropriate side rails in place/Call bell, personal items and telephone in reach/Instruct patient to call for assistance before getting out of bed or chair/Non-slip footwear when patient is out of bed/[place] to call system/Physically safe environment - no spills, clutter or unnecessary equipment/Purposeful Proactive Rounding/Room/bathroom lighting operational, light cord in reach

## 2022-09-19 NOTE — CONSULT NOTE ADULT - REASON FOR ADMISSION
leg weakness
leg weakness
clear to auscultation bilaterally/respirations non-labored/breath sounds equal/good air movement

## 2022-09-19 NOTE — PROGRESS NOTE ADULT - SUBJECTIVE AND OBJECTIVE BOX
HOSPITALIST PROGRESS NOTE    VIANEY PETE  [id number]  89yMale    Patient is a 89y old  Male who presents with a chief complaint of leg weakness (19 Sep 2022 18:29)      SUBJECTIVE:   Chart reviewed since admission.  Patient seen and examined at bedside for weakness, fall, rhabdomyolysis, elevated Troponin      OBJECTIVE:  Vital Signs Last 24 Hrs  T(C): 37.7 (19 Sep 2022 19:02), Max: 38.8 (19 Sep 2022 06:44)  T(F): 99.8 (19 Sep 2022 19:02), Max: 101.8 (19 Sep 2022 06:44)  HR: 75 (19 Sep 2022 19:02) (74 - 87)  BP: 155/79 (19 Sep 2022 19:02) (118/76 - 169/80)  BP(mean): 89 (18 Sep 2022 21:05) (89 - 89)  RR: 20 (19 Sep 2022 19:02) (18 - 22)  SpO2: 99% (19 Sep 2022 19:02) (91% - 99%)    Parameters below as of 19 Sep 2022 19:02  Patient On (Oxygen Delivery Method): room air        PHYSICAL EXAMINATION  General:   HEENT:    NECK:    CVS:   RESP:    GI:    :   MSK:    CNS:    INTEG:    PSYCH:      MONITOR:  CAPILLARY BLOOD GLUCOSE            I&O's Summary    18 Sep 2022 07:01  -  19 Sep 2022 07:00  --------------------------------------------------------  IN: 1370 mL / OUT: 100 mL / NET: 1270 mL    19 Sep 2022 07:01  -  19 Sep 2022 19:20  --------------------------------------------------------  IN: 0 mL / OUT: 300 mL / NET: -300 mL                            10.8   6.87  )-----------( 157      ( 18 Sep 2022 03:02 )             31.8       09-19    138  |  103  |  17.8  ----------------------------<  134<H>  3.2<L>   |  21.0<L>  |  0.94    Ca    8.3<L>      19 Sep 2022 07:20  Mg     1.8         TPro  7.0  /  Alb  4.0  /  TBili  0.6  /  DBili  x   /  AST  32  /  ALT  13  /  AlkPhos  65      CARDIAC MARKERS ( 19 Sep 2022 07:20 )  x     / 0.10 ng/mL / 1112 U/L / x     / 3.5 ng/mL  CARDIAC MARKERS ( 18 Sep 2022 20:21 )  x     / 0.06 ng/mL / x     / x     / x      CARDIAC MARKERS ( 18 Sep 2022 13:30 )  x     / 0.08 ng/mL / x     / x     / x      CARDIAC MARKERS ( 18 Sep 2022 03:02 )  x     / 0.12 ng/mL / 919 U/L / x     / 11.2 ng/mL      Urinalysis Basic - ( 18 Sep 2022 08:15 )    Color: Yellow / Appearance: Clear / S.015 / pH: x  Gluc: x / Ketone: Trace  / Bili: Negative / Urobili: Negative mg/dL   Blood: x / Protein: 30 mg/dL / Nitrite: Negative   Leuk Esterase: Negative / RBC: 0-2 /HPF / WBC 0-2 /HPF   Sq Epi: x / Non Sq Epi: Few / Bacteria: Few        Culture:    TTE:    RADIOLOGY    < from: CT Head No Cont (22 @ 16:48) >  IMPRESSION:    No acute intracranial hemorrhage, brain edema, or mass effect.  No displaced calvarial fracture.    < end of copied text >      MEDICATIONS  (STANDING):  amLODIPine   Tablet 5 milliGRAM(s) Oral daily  apixaban 5 milliGRAM(s) Oral two times a day  aspirin enteric coated 81 milliGRAM(s) Oral daily  atorvastatin 40 milliGRAM(s) Oral at bedtime  carvedilol 12.5 milliGRAM(s) Oral every 12 hours  lisinopril 40 milliGRAM(s) Oral daily  pantoprazole    Tablet 40 milliGRAM(s) Oral before breakfast  predniSONE   Tablet 5 milliGRAM(s) Oral daily  remdesivir  IVPB   IV Intermittent   remdesivir  IVPB 200 milliGRAM(s) IV Intermittent every 24 hours  tamsulosin 0.8 milliGRAM(s) Oral at bedtime      MEDICATIONS  (PRN):  acetaminophen     Tablet .. 650 milliGRAM(s) Oral every 6 hours PRN Temp greater or equal to 38C (100.4F), Mild Pain (1 - 3)     HOSPITALIST PROGRESS NOTE    VIANEY PETE  [id number]  89yMale    Patient is a 89y old  Male who presents with a chief complaint of leg weakness (19 Sep 2022 18:29)      SUBJECTIVE:   Chart reviewed since admission.  Patient seen and examined at bedside for weakness, fall, rhabdomyolysis, elevated Troponin.  Complaining pain in right elbow and left thigh  Denies any dyspnea, chest pain, palpitations  Chronic cough.   Go[facility] to [facility] for fluctuating [facility]      OBJECTIVE:  Vital Signs Last 24 Hrs  T(C): 37.7 (19 Sep 2022 19:02), Max: 38.8 (19 Sep 2022 06:44)  T(F): 99.8 (19 Sep 2022 19:02), Max: 101.8 (19 Sep 2022 06:44)  HR: 75 (19 Sep 2022 19:02) (74 - 87)  BP: 155/79 (19 Sep 2022 19:02) (118/76 - 169/80)  BP(mean): 89 (18 Sep 2022 21:05) (89 - 89)  RR: 20 (19 Sep 2022 19:02) (18 - 22)  SpO2: 99% (19 Sep 2022 19:02) (91% - 99%)    Parameters below as of 19 Sep 2022 19:02  Patient On (Oxygen Delivery Method): room air        PHYSICAL EXAMINATION  General: Elderly male, slighty uncomfortable, lying in bed  HEENT:  EOMI  NECK:  Supple  CVS: regular rate and rhythm S1 s2  RESP:  CTAB  GI:  Soft nondistended nontender BS+  : NO suprapubic tenderness  MSK:  Weak extremities with limited ROM due to weakness and pain  CNS:  AAOx3. Generalized weakness  INTEG:  warm dry skin  PSYCH:  Fair mood    MONITOR:  CAPILLARY BLOOD GLUCOSE            I&O's Summary    18 Sep 2022 07:01  -  19 Sep 2022 07:00  --------------------------------------------------------  IN: 1370 mL / OUT: 100 mL / NET: 1270 mL    19 Sep 2022 07:01  -  19 Sep 2022 19:20  --------------------------------------------------------  IN: 0 mL / OUT: 300 mL / NET: -300 mL                            10.8   6.87  )-----------( 157      ( 18 Sep 2022 03:02 )             31.8           138  |  103  |  17.8  ----------------------------<  134<H>  3.2<L>   |  21.0<L>  |  0.94    Ca    8.3<L>      19 Sep 2022 07:20  Mg     1.8         TPro  7.0  /  Alb  4.0  /  TBili  0.6  /  DBili  x   /  AST  32  /  ALT  13  /  AlkPhos  65      CARDIAC MARKERS ( 19 Sep 2022 07:20 )  x     / 0.10 ng/mL / 1112 U/L / x     / 3.5 ng/mL  CARDIAC MARKERS ( 18 Sep 2022 20:21 )  x     / 0.06 ng/mL / x     / x     / x      CARDIAC MARKERS ( 18 Sep 2022 13:30 )  x     / 0.08 ng/mL / x     / x     / x      CARDIAC MARKERS ( 18 Sep 2022 03:02 )  x     / 0.12 ng/mL / 919 U/L / x     / 11.2 ng/mL      Urinalysis Basic - ( 18 Sep 2022 08:15 )    Color: Yellow / Appearance: Clear / S.015 / pH: x  Gluc: x / Ketone: Trace  / Bili: Negative / Urobili: Negative mg/dL   Blood: x / Protein: 30 mg/dL / Nitrite: Negative   Leuk Esterase: Negative / RBC: 0-2 /HPF / WBC 0-2 /HPF   Sq Epi: x / Non Sq Epi: Few / Bacteria: Few        Culture:    TTE:    RADIOLOGY    < from: CT Head No Cont (22 @ 16:48) >  IMPRESSION:    No acute intracranial hemorrhage, brain edema, or mass effect.  No displaced calvarial fracture.    < end of copied text >      MEDICATIONS  (STANDING):  amLODIPine   Tablet 5 milliGRAM(s) Oral daily  apixaban 5 milliGRAM(s) Oral two times a day  aspirin enteric coated 81 milliGRAM(s) Oral daily  atorvastatin 40 milliGRAM(s) Oral at bedtime  carvedilol 12.5 milliGRAM(s) Oral every 12 hours  lisinopril 40 milliGRAM(s) Oral daily  pantoprazole    Tablet 40 milliGRAM(s) Oral before breakfast  predniSONE   Tablet 5 milliGRAM(s) Oral daily  remdesivir  IVPB   IV Intermittent   remdesivir  IVPB 200 milliGRAM(s) IV Intermittent every 24 hours  tamsulosin 0.8 milliGRAM(s) Oral at bedtime      MEDICATIONS  (PRN):  acetaminophen     Tablet .. 650 milliGRAM(s) Oral every 6 hours PRN Temp greater or equal to 38C (100.4F), Mild Pain (1 - 3)

## 2022-09-19 NOTE — PROGRESS NOTE ADULT - SUBJECTIVE AND OBJECTIVE BOX
VIANEY PETE  [id number]        Chief Complaint: follow up positive trops/leg weakness/COVID      Subjective: no complaints      24 hour Tele:        acetaminophen     Tablet .. 650 milliGRAM(s) Oral every 6 hours PRN  amLODIPine   Tablet 5 milliGRAM(s) Oral daily  apixaban 5 milliGRAM(s) Oral two times a day  aspirin enteric coated 81 milliGRAM(s) Oral daily  atorvastatin 40 milliGRAM(s) Oral at bedtime  carvedilol 12.5 milliGRAM(s) Oral every 12 hours  lisinopril 40 milliGRAM(s) Oral daily  pantoprazole    Tablet 40 milliGRAM(s) Oral before breakfast  predniSONE   Tablet 5 milliGRAM(s) Oral daily  sodium chloride 0.9%. 1000 milliLiter(s) IV Continuous <Continuous>  tamsulosin 0.8 milliGRAM(s) Oral at bedtime          Physical Exam:  T(C): 37.2 (09-19-22 @ 07:28), Max: 38.8 (09-19-22 @ 06:44)  HR: 87 (09-19-22 @ 07:28) (69 - 87)  BP: 160/85 (09-19-22 @ 07:28) (116/80 - 169/80)  RR: 22 (09-19-22 @ 07:28) (18 - 22)  SpO2: 91% (09-19-22 @ 07:28) (91% - 98%)  Wt(kg): --  General: Comfortable in NAD  HEENT: MMM, sclera anicteric  deferred    I&O's Summary    18 Sep 2022 07:01  -  19 Sep 2022 07:00  --------------------------------------------------------  IN: 1370 mL / OUT: 100 mL / NET: 1270 mL          Labs:   19 Sep 2022 07:20    138    |  103    |  17.8   ----------------------------<  134    3.2     |  21.0   |  0.94     Ca    8.3        19 Sep 2022 07:20  Mg     1.8       18 Sep 2022 03:02    TPro  7.0    /  Alb  4.0    /  TBili  0.6    /  DBili  x      /  AST  32     /  ALT  13     /  AlkPhos  65     18 Sep 2022 03:02                          10.8   6.87  )-----------( 157      ( 18 Sep 2022 03:02 )             31.8       CARDIAC MARKERS ( 19 Sep 2022 07:20 )  x     / 0.10 ng/mL / x     / x     / 3.5 ng/mL  CARDIAC MARKERS ( 18 Sep 2022 20:21 )  x     / 0.06 ng/mL / x     / x     / x      CARDIAC MARKERS ( 18 Sep 2022 13:30 )  x     / 0.08 ng/mL / x     / x     / x      CARDIAC MARKERS ( 18 Sep 2022 03:02 )  x     / 0.12 ng/mL / 919 U/L / x     / 11.2 ng/mL          ECG: AV paced rhythm        Assessment:  Patient is a  89y Male with mild non obstructive CAD, PPM for SSS in 2005 with generator change in 2015, PAF on eliquis, HTN, HLD, anemia and prior GIB with polyp resection  here with fall and low level positive trops from mild demand ischemia with mild dehydration and COVID  -Hemodynamically stable and not hypoxic or SOB at rest  -No CHF and A-V paced on ECG, in AF on monitor rate controlled . Device checked in 5/2022, has [employer] dual chamber PPM, 1 year battery status and normal function at that time  -Trops all flat and only  borderline elevated, minimal demand ischemia as noted above    Plan:(INCOMPLETE, TO BE SEEN)  1. Follow up echo, otherwise no further cardiac work up  2. Continue home CV meds  3. Management COVId per primary team  4. Hydration   5. Outpatient [facility] follow up, thanks!      Plan:  1.       Lucio Hameed MD VIANEY PETE  [id number]        Chief Complaint: follow up positive trops/leg weakness/COVID    24 hour Tele: A-V paced, non-capture        acetaminophen     Tablet .. 650 milliGRAM(s) Oral every 6 hours PRN  amLODIPine   Tablet 5 milliGRAM(s) Oral daily  apixaban 5 milliGRAM(s) Oral two times a day  aspirin enteric coated 81 milliGRAM(s) Oral daily  atorvastatin 40 milliGRAM(s) Oral at bedtime  carvedilol 12.5 milliGRAM(s) Oral every 12 hours  lisinopril 40 milliGRAM(s) Oral daily  pantoprazole    Tablet 40 milliGRAM(s) Oral before breakfast  predniSONE   Tablet 5 milliGRAM(s) Oral daily  sodium chloride 0.9%. 1000 milliLiter(s) IV Continuous <Continuous>  tamsulosin 0.8 milliGRAM(s) Oral at bedtime          Physical Exam:  T(C): 37.2 (09-19-22 @ 07:28), Max: 38.8 (09-19-22 @ 06:44)  HR: 87 (09-19-22 @ 07:28) (69 - 87)  BP: 160/85 (09-19-22 @ 07:28) (116/80 - 169/80)  RR: 22 (09-19-22 @ 07:28) (18 - 22)  SpO2: 91% (09-19-22 @ 07:28) (91% - 98%)  Wt(kg): --  General: Comfortable in NAD  HEENT: MMM, sclera anicteric  deferred    I&O's Summary    18 Sep 2022 07:01  -  19 Sep 2022 07:00  --------------------------------------------------------  IN: 1370 mL / OUT: 100 mL / NET: 1270 mL          Labs:   19 Sep 2022 07:20    138    |  103    |  17.8   ----------------------------<  134    3.2     |  21.0   |  0.94     Ca    8.3        19 Sep 2022 07:20  Mg     1.8       18 Sep 2022 03:02    TPro  7.0    /  Alb  4.0    /  TBili  0.6    /  DBili  x      /  AST  32     /  ALT  13     /  AlkPhos  65     18 Sep 2022 03:02                          10.8   6.87  )-----------( 157      ( 18 Sep 2022 03:02 )             31.8       CARDIAC MARKERS ( 19 Sep 2022 07:20 )  x     / 0.10 ng/mL / x     / x     / 3.5 ng/mL  CARDIAC MARKERS ( 18 Sep 2022 20:21 )  x     / 0.06 ng/mL / x     / x     / x      CARDIAC MARKERS ( 18 Sep 2022 13:30 )  x     / 0.08 ng/mL / x     / x     / x      CARDIAC MARKERS ( 18 Sep 2022 03:02 )  x     / 0.12 ng/mL / 919 U/L / x     / 11.2 ng/mL          ECG: AV paced rhythm        Assessment:  Patient is a  89y Male with mild non obstructive CAD, PPM for SSS in 2005 with generator change in 2015, PAF on eliquis, HTN, HLD, anemia and prior GIB with polyp resection  here with fall and low level positive trops from mild demand ischemia with mild dehydration and COVID  -Hemodynamically stable and not hypoxic or SOB at rest  -No CHF and A-V paced on ECG, in AF on monitor rate controlled . Device checked in 5/2022, has [employer] dual chamber PPM, 1 year battery status and normal function at that time  -Trops all flat and only  borderline elevated, minimal demand ischemia as noted above  -Mostly A-V paced overnight, occasional non-capture of device noted on monitor, last check in spring with normal function    Plan  1. Follow up echo, otherwise no further cardiac work up, can follow up as outpatient for further PPM checks  2. Continue home CV meds  3. Management COVId per primary team  4. Hydration   5. Outpatient [facility] follow up, thanks!        Lucio Hameed MD

## 2022-09-19 NOTE — CONSULT NOTE ADULT - SUBJECTIVE AND OBJECTIVE BOX
[facility]                                                INFECTIOUS DISEASES  =======================================================                               August Michelle MD#    Gigi Sinha MD*                           Noreen Garcias MD*   Tiny Gaston MD*            Diplomates American Board of Internal Medicine & Infectious Diseases                  # [place] Office - Appt - Tel  [phone] Fax [phone]                * [place] Office - Appt - Tel [phone] Fax [phone]                                  Hospital Consult line:  [phone]  =======================================================      [id number]  VIANEY PETE   HPI:  89y man with HTN, HLD, paroxsymal Afib on Eliquis, sick sinus syndrome s/p PPM (2005 with generator change 2013), Prostate cancer s/p TURP, Colon Ca s/p colectomy 1992,  presents for evaluation s/p fall. Patient states he was trying to get into bed this evening but fell secondary to b/l knee stiffness. States he could not get up so lay on the floor and fell asleep for a few hours. per wife- she found him laying on his right side, called 911 because she could not help him up. Currently only endorsing bilateral knee pain. Denies any head trauma, chest pain, difficulty breathing, LOC, dizziness.      FH: throat cancer (Sibling); colon cancer  SH: Denies toxic habits, lives with spouse   (18 Sep 2022 09:14)          I have personally reviewed the labs and data; pertinent labs and data are listed in this note; please see below.   =======================================================  Past Medical & Surgical Hx:  =====================  PAST MEDICAL & SURGICAL HISTORY:  Hypertension      Irregular heart rate      Pacemaker  [employer]      Bradycardia      Glaucoma      Prostate cancer      Colon cancer      S/P colectomy  1992      H/O total knee replacement, left      Pacemaker  [employer]      S/P TURP      S/P cataract surgery      History of esophageal surgery        Problem List:  ==========  HEALTH ISSUES - PROBLEM Dx:        Social Hx:  =======  no toxic habits currently    FAMILY HISTORY:  Family hx of colon cancer    FH: throat cancer (Sibling)    no significant family history of immunosuppressive disorders in mother or father   =======================================================    REVIEW OF SYSTEMS:  CONSTITUTIONAL:  No Fever or chills  HEENT:  No diplopia or blurred vision.  No earache, sore throat or runny nose.  CARDIOVASCULAR:  No pressure, squeezing, strangling, tightness, heaviness or aching about the chest, neck, axilla or epigastrium.  RESPIRATORY:  No cough, shortness of breath  GASTROINTESTINAL:  No nausea, vomiting or diarrhea.  GENITOURINARY:  No dysuria, frequency or urgency. No Blood in urine  MUSCULOSKELETAL:  no joint aches, no muscle pain  SKIN:  No change in skin, hair or nails.  NEUROLOGIC:  No Headaches, seizures or weakness.  PSYCHIATRIC:  No disorder of thought or mood.  ENDOCRINE:  No heat or cold intolerance  HEMATOLOGICAL:  No easy bruising or bleeding.    =======================================================  Allergies    No Known Allergies    Intolerances    Antibiotics:  remdesivir  IVPB   IV Intermittent     Other medications:  amLODIPine   Tablet 5 milliGRAM(s) Oral daily  apixaban 5 milliGRAM(s) Oral two times a day  aspirin enteric coated 81 milliGRAM(s) Oral daily  atorvastatin 40 milliGRAM(s) Oral at bedtime  carvedilol 12.5 milliGRAM(s) Oral every 12 hours  lisinopril 40 milliGRAM(s) Oral daily  pantoprazole    Tablet 40 milliGRAM(s) Oral before breakfast  predniSONE   Tablet 5 milliGRAM(s) Oral daily  tamsulosin 0.8 milliGRAM(s) Oral at bedtime       ======================================================  Physical Exam:  ============  T(F): 99.6 (19 Sep 2022 16:26), Max: 101.8 (19 Sep 2022 06:44)  HR: 75 (19 Sep 2022 16:26)  BP: 134/78 (19 Sep 2022 16:26)  RR: 20 (19 Sep 2022 16:26)  SpO2: 97% (19 Sep 2022 16:26) (91% - 98%)  temp max in last 48H T(F): , Max: 101.8 (09-19-22 @ 06:44)    General:  No acute distress.  Eye: Pupils are equal, round and reactive to light, Normal conjunctiva.  HENT: Normocephalic, Oral mucosa is moist, No pharyngeal erythema, No sinus tenderness.  Neck: Supple, No lymphadenopathy.  Respiratory: Lungs are clear to auscultation, Respirations are non-labored.  Cardiovascular: Normal rate, Regular rhythm, s1 + s2  Gastrointestinal: Soft, Non-tender, Non-distended, Normal bowel sounds.  Genitourinary: No costovertebral angle tenderness.  Lymphatics: No lymphadenopathy neck,   Musculoskeletal: Normal range of motion, Normal strength.  Integumentary: No rash.  Neurologic: Alert, Oriented, No focal deficits  Psychiatric: Appropriate mood & affect.    =======================================================  Labs:                        10.8   6.87  )-----------( 157      ( 18 Sep 2022 03:02 )             31.8     09-19    138  |  103  |  17.8  ----------------------------<  134<H>  3.2<L>   |  21.0<L>  |  0.94    Ca    8.3<L>      19 Sep 2022 07:20  Mg     1.8     09-18    TPro  7.0  /  Alb  4.0  /  TBili  0.6  /  DBili  x   /  AST  32  /  ALT  13  /  AlkPhos  65  09-18      Culture - Blood (collected 09-18-22 @ 06:11)  Source: .Blood Blood-Venous    Culture - Blood (collected 09-18-22 @ 06:01)  Source: .Blood Blood-Venous       SARS-CoV-2: Detected (09-18-22 @ 09:00)                                                   [facility]                                                INFECTIOUS DISEASES  =======================================================                               August Michelle MD#    Gigi Sinha MD*                           Noreen Garcias MD*   Tiny Gaston MD*            Diplomates American Board of Internal Medicine & Infectious Diseases                  # [place] Office - Appt - Tel  [phone] Fax [phone]                * [place] Office - Appt - Tel [phone] Fax [phone]                                  Hospital Consult line:  [phone]  =======================================================      [id number]  VIANEY PETE   HPI:  89y man with HTN, HLD, paroxsymal Afib on Eliquis, sick sinus syndrome s/p PPM (2005 with generator change 2013), Prostate cancer s/p TURP, Colon Ca s/p colectomy 1992,  presents for evaluation s/p fall. Patient states he was trying to get into bed this evening but fell secondary to b/l knee stiffness. States he could not get up so lay on the floor and fell asleep for a few hours. per wife- she found him laying on his right side, called 911 because she could not help him up. Currently only endorsing bilateral knee pain. Denies any head trauma, chest pain, difficulty breathing, LOC, dizziness.      FH: throat cancer (Sibling); colon cancer  SH: Denies toxic habits, lives with spouse   (18 Sep 2022 09:14)          I have personally reviewed the labs and data; pertinent labs and data are listed in this note; please see below.   =======================================================  Past Medical & Surgical Hx:  =====================  PAST MEDICAL & SURGICAL HISTORY:  Hypertension      Irregular heart rate      Pacemaker  [employer]      Bradycardia      Glaucoma      Prostate cancer      Colon cancer      S/P colectomy  1992      H/O total knee replacement, left      Pacemaker  [place] [employer]      S/P TURP      S/P cataract surgery      History of esophageal surgery        Problem List:  ==========  HEALTH ISSUES - PROBLEM Dx:        Social Hx:  =======  no toxic habits currently    FAMILY HISTORY:  Family hx of colon cancer    FH: throat cancer (Sibling)    no significant family history of immunosuppressive disorders in mother or father   =======================================================    REVIEW OF SYSTEMS:  CONSTITUTIONAL:  No Fever or chills +weakness  HEENT:  No diplopia or blurred vision.  No earache, sore throat or runny nose.  CARDIOVASCULAR:  No pressure, squeezing, strangling, tightness, heaviness or aching about the chest, neck, axilla or epigastrium.  RESPIRATORY:  + cough, no shortness of breath  GASTROINTESTINAL:  No nausea, vomiting or diarrhea.  GENITOURINARY:  No dysuria, frequency or urgency. No Blood in urine  MUSCULOSKELETAL:  no joint aches, no muscle pain  SKIN:  No change in skin, hair or nails.  NEUROLOGIC:  No Headaches, seizures or weakness.  PSYCHIATRIC:  No disorder of thought or mood.  ENDOCRINE:  No heat or cold intolerance  HEMATOLOGICAL:  No easy bruising or bleeding.    =======================================================  Allergies    No Known Allergies    Intolerances    Antibiotics:  remdesivir  IVPB   IV Intermittent     Other medications:  amLODIPine   Tablet 5 milliGRAM(s) Oral daily  apixaban 5 milliGRAM(s) Oral two times a day  aspirin enteric coated 81 milliGRAM(s) Oral daily  atorvastatin 40 milliGRAM(s) Oral at bedtime  carvedilol 12.5 milliGRAM(s) Oral every 12 hours  lisinopril 40 milliGRAM(s) Oral daily  pantoprazole    Tablet 40 milliGRAM(s) Oral before breakfast  predniSONE   Tablet 5 milliGRAM(s) Oral daily  tamsulosin 0.8 milliGRAM(s) Oral at bedtime       ======================================================  Physical Exam:  ============  T(F): 99.6 (19 Sep 2022 16:26), Max: 101.8 (19 Sep 2022 06:44)  HR: 75 (19 Sep 2022 16:26)  BP: 134/78 (19 Sep 2022 16:26)  RR: 20 (19 Sep 2022 16:26)  SpO2: 97% (19 Sep 2022 16:26) (91% - 98%)  temp max in last 48H T(F): , Max: 101.8 (09-19-22 @ 06:44)    General:  No acute distress.  Eye: Pupils are equal, round and reactive to light, Normal conjunctiva.  HENT: Normocephalic, Oral mucosa is moist, No pharyngeal erythema, No sinus tenderness.  Neck: Supple, No lymphadenopathy.  Respiratory: Lungs are clear to auscultation, Respirations are non-labored.  Cardiovascular: Normal rate, Regular rhythm, s1 + s2 left chest ppm intact  Gastrointestinal: Soft, Non-tender, Non-distended, Normal bowel sounds.  Genitourinary: No costovertebral angle tenderness.  Lymphatics: No lymphadenopathy neck,   Musculoskeletal: Normal range of motion, Normal strength.  Integumentary: No rash.  Neurologic: Alert, Oriented, No focal deficits  Psychiatric: Appropriate mood & affect.    =======================================================  Labs:                        10.8   6.87  )-----------( 157      ( 18 Sep 2022 03:02 )             31.8     09-19    138  |  103  |  17.8  ----------------------------<  134<H>  3.2<L>   |  21.0<L>  |  0.94    Ca    8.3<L>      19 Sep 2022 07:20  Mg     1.8     09-18    TPro  7.0  /  Alb  4.0  /  TBili  0.6  /  DBili  x   /  AST  32  /  ALT  13  /  AlkPhos  65  09-18      Culture - Blood (collected 09-18-22 @ 06:11)  Source: .Blood Blood-Venous    Culture - Blood (collected 09-18-22 @ 06:01)  Source: .Blood Blood-Venous       SARS-CoV-2: Detected (09-18-22 @ 09:00)       < from: CT Head No Cont (09.19.22 @ 16:48) >    < from: Xray Chest 1 View- PORTABLE-Urgent (Xray Chest 1 View- PORTABLE-Urgent .) (09.18.22 @ 09:48) >  FINDINGS:  The cardiac silhouette is normal in size. There is a   left-sided dual-lead pacemaker. There are no focal consolidations or   pleural effusions. The hilar and mediastinal structures appear   unremarkable. The osseous structures are intact.    IMPRESSION: Clear lungs.    < end of copied text >      FINDINGS:    No hydrocephalus, mass effect, midline shift, acuteintracranial   hemorrhage, or brain edema.    Mild white matter microvascular ischemic disease.    No displaced calvarial fracture. No lytic or sclerotic osseous lesion.    Visualized paranasal sinuses and mastoid air cells are clear.    IMPRESSION:    No acute intracranial hemorrhage, brain edema, or mass effect.  No displaced calvarial fracture.    --- End of Report ---    < end of copied text >

## 2022-09-20 ENCOUNTER — TRANSCRIPTION ENCOUNTER (OUTPATIENT)
Age: 87
End: 2022-09-20

## 2022-09-20 LAB
ALBUMIN SERPL ELPH-MCNC: 3.2 G/DL — LOW (ref 3.3–5.2)
ALP SERPL-CCNC: 51 U/L — SIGNIFICANT CHANGE UP (ref 40–120)
ALT FLD-CCNC: 18 U/L — SIGNIFICANT CHANGE UP
AST SERPL-CCNC: 32 U/L — SIGNIFICANT CHANGE UP
BILIRUB DIRECT SERPL-MCNC: 0.1 MG/DL — SIGNIFICANT CHANGE UP (ref 0–0.3)
BILIRUB INDIRECT FLD-MCNC: 0.1 MG/DL — LOW (ref 0.2–1)
BILIRUB SERPL-MCNC: 0.2 MG/DL — LOW (ref 0.4–2)
CK MB CFR SERPL CALC: 2.4 NG/ML — SIGNIFICANT CHANGE UP (ref 0–6.7)
CK SERPL-CCNC: 760 U/L — HIGH (ref 30–200)
CREAT SERPL-MCNC: 0.9 MG/DL — SIGNIFICANT CHANGE UP (ref 0.5–1.3)
CRP SERPL-MCNC: 147 MG/L — HIGH
CULTURE RESULTS: SIGNIFICANT CHANGE UP
EGFR: 81 ML/MIN/1.73M2 — SIGNIFICANT CHANGE UP
ERYTHROCYTE [SEDIMENTATION RATE] IN BLOOD: 55 MM/HR — HIGH (ref 0–20)
INR BLD: 1.94 RATIO — HIGH (ref 0.88–1.16)
PROT SERPL-MCNC: 6.2 G/DL — LOW (ref 6.6–8.7)
PROTHROM AB SERPL-ACNC: 22.6 SEC — HIGH (ref 10.5–13.4)
SPECIMEN SOURCE: SIGNIFICANT CHANGE UP

## 2022-09-20 PROCEDURE — 99233 SBSQ HOSP IP/OBS HIGH 50: CPT

## 2022-09-20 PROCEDURE — 93306 TTE W/DOPPLER COMPLETE: CPT | Mod: 26

## 2022-09-20 PROCEDURE — 73030 X-RAY EXAM OF SHOULDER: CPT | Mod: 26,RT

## 2022-09-20 RX ADMIN — LISINOPRIL 40 MILLIGRAM(S): 2.5 TABLET ORAL at 06:17

## 2022-09-20 RX ADMIN — AMLODIPINE BESYLATE 5 MILLIGRAM(S): 2.5 TABLET ORAL at 06:17

## 2022-09-20 RX ADMIN — REMDESIVIR 500 MILLIGRAM(S): 5 INJECTION INTRAVENOUS at 21:12

## 2022-09-20 RX ADMIN — Medication 5 MILLIGRAM(S): at 06:17

## 2022-09-20 RX ADMIN — PANTOPRAZOLE SODIUM 40 MILLIGRAM(S): 20 TABLET, DELAYED RELEASE ORAL at 06:17

## 2022-09-20 RX ADMIN — TAMSULOSIN HYDROCHLORIDE 0.8 MILLIGRAM(S): 0.4 CAPSULE ORAL at 21:11

## 2022-09-20 RX ADMIN — Medication 375 MILLIGRAM(S): at 17:28

## 2022-09-20 RX ADMIN — APIXABAN 5 MILLIGRAM(S): 2.5 TABLET, FILM COATED ORAL at 17:28

## 2022-09-20 RX ADMIN — Medication 375 MILLIGRAM(S): at 06:18

## 2022-09-20 RX ADMIN — Medication 81 MILLIGRAM(S): at 12:09

## 2022-09-20 RX ADMIN — CARVEDILOL PHOSPHATE 12.5 MILLIGRAM(S): 80 CAPSULE, EXTENDED RELEASE ORAL at 06:17

## 2022-09-20 RX ADMIN — CARVEDILOL PHOSPHATE 12.5 MILLIGRAM(S): 80 CAPSULE, EXTENDED RELEASE ORAL at 17:28

## 2022-09-20 RX ADMIN — ATORVASTATIN CALCIUM 40 MILLIGRAM(S): 80 TABLET, FILM COATED ORAL at 21:11

## 2022-09-20 RX ADMIN — APIXABAN 5 MILLIGRAM(S): 2.5 TABLET, FILM COATED ORAL at 06:17

## 2022-09-20 NOTE — PROGRESS NOTE ADULT - SUBJECTIVE AND OBJECTIVE BOX
[facility] Division of Hospital Medicine    Chief Complaint:  weakness and fall     SUBJECTIVE / OVERNIGHT EVENTS:  Pt says his R shoulder hurts today     Patient denies chest pain, SOB, abd pain, N/V, fever, chills, dysuria or any other complaints. All remainder ROS negative.     MEDICATIONS  (STANDING):  amLODIPine   Tablet 5 milliGRAM(s) Oral daily  apixaban 5 milliGRAM(s) Oral two times a day  aspirin enteric coated 81 milliGRAM(s) Oral daily  atorvastatin 40 milliGRAM(s) Oral at bedtime  carvedilol 12.5 milliGRAM(s) Oral every 12 hours  lisinopril 40 milliGRAM(s) Oral daily  naproxen 375 milliGRAM(s) Oral two times a day  pantoprazole    Tablet 40 milliGRAM(s) Oral before breakfast  predniSONE   Tablet 5 milliGRAM(s) Oral daily  remdesivir  IVPB   IV Intermittent   remdesivir  IVPB 100 milliGRAM(s) IV Intermittent every 24 hours  tamsulosin 0.8 milliGRAM(s) Oral at bedtime    MEDICATIONS  (PRN):  acetaminophen     Tablet .. 650 milliGRAM(s) Oral every 6 hours PRN Temp greater or equal to 38C (100.4F), Mild Pain (1 - 3)        I&O's Summary    19 Sep 2022 07:01  -  20 Sep 2022 07:00  --------------------------------------------------------  IN: 0 mL / OUT: 300 mL / NET: -300 mL        PHYSICAL EXAM:  Vital Signs Last 24 Hrs  T(C): 36.7 (20 Sep 2022 11:46), Max: 37.7 (19 Sep 2022 19:02)  T(F): 98.1 (20 Sep 2022 11:46), Max: 99.8 (19 Sep 2022 19:02)  HR: 72 (20 Sep 2022 11:46) (71 - 75)  BP: 103/67 (20 Sep 2022 11:46) (103/67 - 155/79)  BP(mean): --  RR: 20 (20 Sep 2022 11:46) (20 - 20)  SpO2: 97% (20 Sep 2022 11:46) (97% - 99%)    Parameters below as of 20 Sep 2022 04:12  Patient On (Oxygen Delivery Method): room air          CONSTITUTIONAL: NAD, elderly man sitting up in bed   ENMT: Moist oral mucosa, EOMI   RESPIRATORY: Normal respiratory effort; lungs are clear to auscultation bilaterally  CARDIOVASCULAR: Regular rate and rhythm, normal S1 and S2, No lower extremity edema  ABDOMEN: Nontender to palpation, normoactive bowel sounds  MUSCULOSKELETAL:  No clubbing or cyanosis of digits; R shoulder pain with ROM   PSYCH: A+O to person, place, and time; affect appropriate  NEUROLOGY: No gross sensory or motor deficits   SKIN: Warm and dry       LABS:    09-20    x   |  x   |  x   ----------------------------<  x   x    |  x   |  0.90    Ca    8.3<L>      19 Sep 2022 07:20    TPro  6.2<L>  /  Alb  3.2<L>  /  TBili  0.2<L>  /  DBili  0.1  /  AST  32  /  ALT  18  /  AlkPhos  51  09-20    PT/INR - ( 20 Sep 2022 05:31 )   PT: 22.6 sec;   INR: 1.94 ratio           CARDIAC MARKERS ( 20 Sep 2022 05:33 )  x     / x     / 760 U/L / x     / 2.4 ng/mL  CARDIAC MARKERS ( 19 Sep 2022 07:20 )  x     / 0.10 ng/mL / 1112 U/L / x     / 3.5 ng/mL  CARDIAC MARKERS ( 18 Sep 2022 20:21 )  x     / 0.06 ng/mL / x     / x     / x              Culture - Urine (collected 18 Sep 2022 08:15)  Source: Clean Catch Clean Catch (Midstream)  Final Report (20 Sep 2022 07:56):    <10,000 CFU/mL Normal Urogenital Cathy    Culture - Blood (collected 18 Sep 2022 06:11)  Source: .Blood Blood-Venous  Preliminary Report (19 Sep 2022 17:02):    No growth to date.    Culture - Blood (collected 18 Sep 2022 06:01)  Source: .Blood Blood-Venous  Preliminary Report (19 Sep 2022 17:02):    No growth to date.

## 2022-09-20 NOTE — DISCHARGE NOTE NURSING/CASE MANAGEMENT/SOCIAL WORK - NSDCFUADDAPPT_GEN_ALL_CORE_FT
STAR pt-will need follow up b4 PR  NW if home  No meds to bed from [facility], no medication review needed  Will get Cardio appt, yellow STAR/folder pack given STAR pt-will need follow up b4 DC  No [facility] for home care  No meds to bed from [facility], no medication review needed due to LUIS  Will get Cardio appt, yellow STAR/folder pack given STAR pt-[facility] will call pt per clerical  No [facility] for home care due to LUIS  No meds to bed from [facility], no medication review needed due to LUIS   yellow STAR/folder pack given STAR pt-[facility] will call pt per clerical  No [facility] for home care due to LUIS  No meds to bed from [facility], no medication review needed due to LUIS   yellow STAR/folder pack given      Patient will attend [facility] upon discharge. Patient's daughter, Bharti, is aware and in agreement.

## 2022-09-20 NOTE — PHYSICAL THERAPY INITIAL EVALUATION ADULT - ACTIVE RANGE OF MOTION EXAMINATION, REHAB EVAL
right shoulder flexion to 30deg 2/2 pain/bilateral  lower extremity Active ROM was WFL (within functional limits)/deficits as listed below

## 2022-09-20 NOTE — PROGRESS NOTE ADULT - ASSESSMENT
89y man with HTN, HLD, paroxsymal Afib on Eliquis, sick sinus syndrome s/p PPM (2005 with generator change 2013), Prostate cancer s/p TURP, Colon Ca s/p colectomy 1992,  presents for evaluation s/p fall. Patient states he was trying to get into bed this evening but fell secondary to b/l knee stiffness. States he could not get up so lay on the floor and fell asleep for a few hours. Per wife- she found him laying on his right side.    Fall, due to weakness  Elevated Troponin due to fall or leak  c/w ASA, Statin, BB   Trops flat, await ECHO     COVID-19 infection  Remdesivir   ID consult appreciated   symptomatic treatment  isolation  no resp symptoms or hypoxia    Rhabdomyolysis   CPK improving   Monitor CK   Naproxen ordered for pain    HTN, HLD  Resume Amlodipine and Lisinopril  Hold Lasix      pA fib, s/p PPM  Cont Eliquis  AV paced rhythm  not on any AVN blockers at home    Leg weakness  X ray LE: Prostate seeds. Rounded sclerotic lesion seen lateral right iliac wing and right ischium, suspect metastatic disease.   Outpt follow up with Oncology     R Shoulder pain after fall   Xrays ordered       DVT: Eliquis

## 2022-09-20 NOTE — PHYSICAL THERAPY INITIAL EVALUATION ADULT - ADDITIONAL COMMENTS
Pt states he lives in a house with his spouse, few steps to enter and a flight of stairs inside to bedroom. States his daughter is going to get a chair-lift. Uses a rollator at baseline.

## 2022-09-20 NOTE — DISCHARGE NOTE NURSING/CASE MANAGEMENT/SOCIAL WORK - PATIENT PORTAL LINK FT
You can access the FollowMyHealth Patient Portal offered by [facility] by registering at the following website: [email or web address]. By joining [facility]’s FollowMyHealth portal, you will also be able to view your health information using other applications (apps) compatible with our system.

## 2022-09-21 LAB
ALBUMIN SERPL ELPH-MCNC: 3 G/DL — LOW (ref 3.3–5.2)
ALP SERPL-CCNC: 50 U/L — SIGNIFICANT CHANGE UP (ref 40–120)
ALT FLD-CCNC: 17 U/L — SIGNIFICANT CHANGE UP
ANION GAP SERPL CALC-SCNC: 12 MMOL/L — SIGNIFICANT CHANGE UP (ref 5–17)
AST SERPL-CCNC: 26 U/L — SIGNIFICANT CHANGE UP
BASOPHILS # BLD AUTO: 0.02 K/UL — SIGNIFICANT CHANGE UP (ref 0–0.2)
BASOPHILS NFR BLD AUTO: 0.6 % — SIGNIFICANT CHANGE UP (ref 0–2)
BILIRUB DIRECT SERPL-MCNC: 0.1 MG/DL — SIGNIFICANT CHANGE UP (ref 0–0.3)
BILIRUB INDIRECT FLD-MCNC: SIGNIFICANT CHANGE UP MG/DL (ref 0.2–1)
BILIRUB SERPL-MCNC: <0.2 MG/DL — LOW (ref 0.4–2)
BUN SERPL-MCNC: 28.6 MG/DL — HIGH (ref 8–20)
CALCIUM SERPL-MCNC: 7.6 MG/DL — LOW (ref 8.4–10.5)
CHLORIDE SERPL-SCNC: 108 MMOL/L — HIGH (ref 98–107)
CO2 SERPL-SCNC: 22 MMOL/L — SIGNIFICANT CHANGE UP (ref 22–29)
CREAT SERPL-MCNC: 0.94 MG/DL — SIGNIFICANT CHANGE UP (ref 0.5–1.3)
EGFR: 77 ML/MIN/1.73M2 — SIGNIFICANT CHANGE UP
EOSINOPHIL # BLD AUTO: 0.25 K/UL — SIGNIFICANT CHANGE UP (ref 0–0.5)
EOSINOPHIL NFR BLD AUTO: 7.1 % — HIGH (ref 0–6)
GLUCOSE SERPL-MCNC: 115 MG/DL — HIGH (ref 70–99)
HCT VFR BLD CALC: 28.9 % — LOW (ref 39–50)
HGB BLD-MCNC: 9.3 G/DL — LOW (ref 13–17)
IMM GRANULOCYTES NFR BLD AUTO: 0.6 % — SIGNIFICANT CHANGE UP (ref 0–0.9)
INR BLD: 2.06 RATIO — HIGH (ref 0.88–1.16)
LYMPHOCYTES # BLD AUTO: 0.67 K/UL — LOW (ref 1–3.3)
LYMPHOCYTES # BLD AUTO: 19 % — SIGNIFICANT CHANGE UP (ref 13–44)
MCHC RBC-ENTMCNC: 31.2 PG — SIGNIFICANT CHANGE UP (ref 27–34)
MCHC RBC-ENTMCNC: 32.2 GM/DL — SIGNIFICANT CHANGE UP (ref 32–36)
MCV RBC AUTO: 97 FL — SIGNIFICANT CHANGE UP (ref 80–100)
MONOCYTES # BLD AUTO: 0.52 K/UL — SIGNIFICANT CHANGE UP (ref 0–0.9)
MONOCYTES NFR BLD AUTO: 14.8 % — HIGH (ref 2–14)
NEUTROPHILS # BLD AUTO: 2.04 K/UL — SIGNIFICANT CHANGE UP (ref 1.8–7.4)
NEUTROPHILS NFR BLD AUTO: 57.9 % — SIGNIFICANT CHANGE UP (ref 43–77)
PLATELET # BLD AUTO: 141 K/UL — LOW (ref 150–400)
POTASSIUM SERPL-MCNC: 4.1 MMOL/L — SIGNIFICANT CHANGE UP (ref 3.5–5.3)
POTASSIUM SERPL-SCNC: 4.1 MMOL/L — SIGNIFICANT CHANGE UP (ref 3.5–5.3)
PROT SERPL-MCNC: 5.9 G/DL — LOW (ref 6.6–8.7)
PROTHROM AB SERPL-ACNC: 24.1 SEC — HIGH (ref 10.5–13.4)
RBC # BLD: 2.98 M/UL — LOW (ref 4.2–5.8)
RBC # FLD: 14.1 % — SIGNIFICANT CHANGE UP (ref 10.3–14.5)
SODIUM SERPL-SCNC: 142 MMOL/L — SIGNIFICANT CHANGE UP (ref 135–145)
WBC # BLD: 3.52 K/UL — LOW (ref 3.8–10.5)
WBC # FLD AUTO: 3.52 K/UL — LOW (ref 3.8–10.5)

## 2022-09-21 PROCEDURE — 99233 SBSQ HOSP IP/OBS HIGH 50: CPT

## 2022-09-21 PROCEDURE — 99232 SBSQ HOSP IP/OBS MODERATE 35: CPT

## 2022-09-21 RX ORDER — CARVEDILOL PHOSPHATE 80 MG/1
12.5 CAPSULE, EXTENDED RELEASE ORAL EVERY 12 HOURS
Refills: 0 | Status: DISCONTINUED | OUTPATIENT
Start: 2022-09-21 | End: 2022-09-23

## 2022-09-21 RX ADMIN — Medication 375 MILLIGRAM(S): at 18:15

## 2022-09-21 RX ADMIN — Medication 375 MILLIGRAM(S): at 05:56

## 2022-09-21 RX ADMIN — AMLODIPINE BESYLATE 5 MILLIGRAM(S): 2.5 TABLET ORAL at 05:57

## 2022-09-21 RX ADMIN — APIXABAN 5 MILLIGRAM(S): 2.5 TABLET, FILM COATED ORAL at 05:56

## 2022-09-21 RX ADMIN — REMDESIVIR 500 MILLIGRAM(S): 5 INJECTION INTRAVENOUS at 21:55

## 2022-09-21 RX ADMIN — LISINOPRIL 40 MILLIGRAM(S): 2.5 TABLET ORAL at 05:57

## 2022-09-21 RX ADMIN — Medication 81 MILLIGRAM(S): at 12:36

## 2022-09-21 RX ADMIN — CARVEDILOL PHOSPHATE 12.5 MILLIGRAM(S): 80 CAPSULE, EXTENDED RELEASE ORAL at 05:56

## 2022-09-21 RX ADMIN — Medication 5 MILLIGRAM(S): at 05:56

## 2022-09-21 RX ADMIN — APIXABAN 5 MILLIGRAM(S): 2.5 TABLET, FILM COATED ORAL at 18:16

## 2022-09-21 RX ADMIN — CARVEDILOL PHOSPHATE 12.5 MILLIGRAM(S): 80 CAPSULE, EXTENDED RELEASE ORAL at 18:28

## 2022-09-21 RX ADMIN — TAMSULOSIN HYDROCHLORIDE 0.8 MILLIGRAM(S): 0.4 CAPSULE ORAL at 21:55

## 2022-09-21 RX ADMIN — ATORVASTATIN CALCIUM 40 MILLIGRAM(S): 80 TABLET, FILM COATED ORAL at 21:55

## 2022-09-21 RX ADMIN — PANTOPRAZOLE SODIUM 40 MILLIGRAM(S): 20 TABLET, DELAYED RELEASE ORAL at 05:57

## 2022-09-21 NOTE — PROGRESS NOTE ADULT - SUBJECTIVE AND OBJECTIVE BOX
VIANEY PETE  [id number]        Chief Complaint: follow up leg weakness/COVID/positive trops      Subjective: feels a bit stronger today       acetaminophen     Tablet .. 650 milliGRAM(s) Oral every 6 hours PRN  amLODIPine   Tablet 5 milliGRAM(s) Oral daily  apixaban 5 milliGRAM(s) Oral two times a day  aspirin enteric coated 81 milliGRAM(s) Oral daily  atorvastatin 40 milliGRAM(s) Oral at bedtime  carvedilol 12.5 milliGRAM(s) Oral every 12 hours  lisinopril 40 milliGRAM(s) Oral daily  naproxen 375 milliGRAM(s) Oral two times a day  pantoprazole    Tablet 40 milliGRAM(s) Oral before breakfast  predniSONE   Tablet 5 milliGRAM(s) Oral daily  remdesivir  IVPB   IV Intermittent   remdesivir  IVPB 100 milliGRAM(s) IV Intermittent every 24 hours  tamsulosin 0.8 milliGRAM(s) Oral at bedtime          Physical Exam:  T(C): 36.5 (09-21-22 @ 04:36), Max: 36.7 (09-20-22 @ 11:46)  HR: 69 (09-21-22 @ 04:36) (69 - 72)  BP: 116/71 (09-21-22 @ 04:36) (103/67 - 121/76)  RR: 20 (09-21-22 @ 04:36) (20 - 20)  SpO2: 97% (09-21-22 @ 04:36) (97% - 98%)  Wt(kg): --  General: elderly M comfortable in NAD  HEENT: MMM, sclera anicteric  Resp: CTA bilaterally  CVS: nl s1s2, rrr, no s3/JVD  Abd: soft, NT, ND, BS+  Ext: No c/c/e  Neuro A&O x3  Psych: Normal affect    I&O's Summary    20 Sep 2022 07:01  -  21 Sep 2022 07:00  --------------------------------------------------------  IN: 0 mL / OUT: 650 mL / NET: -650 mL          Labs:   21 Sep 2022 07:45    142    |  108    |  28.6   ----------------------------<  115    4.1     |  22.0   |  0.94     Ca    7.6        21 Sep 2022 07:45    TPro  5.9    /  Alb  3.0    /  TBili  <0.2   /  DBili  0.1    /  AST  26     /  ALT  17     /  AlkPhos  50     21 Sep 2022 07:45                          9.3    3.52  )-----------( 141      ( 21 Sep 2022 07:45 )             28.9     PT/INR - ( 21 Sep 2022 07:45 )   PT: 24.1 sec;   INR: 2.06 ratio           CARDIAC MARKERS ( 20 Sep 2022 05:33 )  x     / x     / 760 U/L / x     / 2.4 ng/mL        ECG: AV paced rhythm    O 9/2022:    1. Left ventricular ejection fraction, by visual estimation, is 40 to45%.   2. Moderately decreased global left ventricular systolic function.   3. Multiple left ventricular regional wall motion abnormalities exist.   See wall motion findings.   4. Mildly increased LV wall thickness.   5. Spectral Doppler shows pseudonormal pattern of left ventricular   myocardial filling (Grade II diastolic dysfunction).   6. There is mild concentric left ventricular hypertrophy.   7. Mildly enlarged right ventricle.   8. Severely enlarged left atrium.   9. Mild to moderately enlarged right atrium.  10. Moderate tricuspid regurgitation.  11. Mild mitral valve regurgitation.  12. Mild thickening of the anterior and posterior mitral valve leaflets.  13. Sclerotic aortic valve with normal opening.  14. Mild pulmonic valve regurgitation.  15. There is no evidence of pericardial effusion.  16. Endocardial visualization was enhanced with intravenous echo contrast.  17. There are no prior studies on this patient for comparison purposes.      Assessment:  Patient is a  89y Male with mild non obstructive CAD, PPM for SSS in 2005 with generator change in 2015, PAF on eliquis, HTN, HLD, anemia and prior GIB with polyp resection  here with fall and low level positive trops from mild demand ischemia with mild dehydration and COVID  -Hemodynamically stable and not hypoxic or SOB at rest  -No CHF and A-V paced on ECG, in AF on monitor rate controlled . Device checked in 5/2022, has [employer]rua dual chamber PPM, 1 year battery status and normal function at that time  -Trops all flat and only  borderline elevated, minimal demand ischemia as noted above  -Mostly A-V paced occasional non-capture of device noted on monitor, last check in spring with normal function  -Reviewed echo and does have nue moderate LV dysfunction with regional WMA mostly apically. Currently without CP or decomensated CHF and symptoms appear unrelated that led to admission. Maybe related to COVID or could have underlying CAD. Patient not amenable to inpatient evaluatino and do recommend medical management regardless at this time. Can consider further ischemic work up as outpatient or repeat echo to re-evaluate at that time. Already on beta blocker and ACEI at this time    Plan  1. Med management of new CMP, further outpatient evaluation post LUIS with [facility]  2. Continue home CV meds  3. Management COVId per primary team  4. CV stable for discharge, thanks!         Lucio Hameed MD

## 2022-09-21 NOTE — PROGRESS NOTE ADULT - SUBJECTIVE AND OBJECTIVE BOX
[facility] Division of Hospital Medicine    Chief Complaint:  weakness and fall     SUBJECTIVE / OVERNIGHT EVENTS:   Pt says he feels better   His R shoulder still bothering him     Patient denies chest pain, SOB, abd pain, N/V, fever, chills, dysuria or any other complaints. All remainder ROS negative.     MEDICATIONS  (STANDING):  amLODIPine   Tablet 5 milliGRAM(s) Oral daily  apixaban 5 milliGRAM(s) Oral two times a day  aspirin enteric coated 81 milliGRAM(s) Oral daily  atorvastatin 40 milliGRAM(s) Oral at bedtime  carvedilol 12.5 milliGRAM(s) Oral every 12 hours  lisinopril 40 milliGRAM(s) Oral daily  naproxen 375 milliGRAM(s) Oral two times a day  pantoprazole    Tablet 40 milliGRAM(s) Oral before breakfast  predniSONE   Tablet 5 milliGRAM(s) Oral daily  remdesivir  IVPB   IV Intermittent   remdesivir  IVPB 100 milliGRAM(s) IV Intermittent every 24 hours  tamsulosin 0.8 milliGRAM(s) Oral at bedtime    MEDICATIONS  (PRN):  acetaminophen     Tablet .. 650 milliGRAM(s) Oral every 6 hours PRN Temp greater or equal to 38C (100.4F), Mild Pain (1 - 3)        I&O's Summary    20 Sep 2022 07:01  -  21 Sep 2022 07:00  --------------------------------------------------------  IN: 0 mL / OUT: 650 mL / NET: -650 mL        PHYSICAL EXAM:  Vital Signs Last 24 Hrs  T(C): 36.4 (21 Sep 2022 11:47), Max: 36.5 (21 Sep 2022 04:36)  T(F): 97.6 (21 Sep 2022 11:47), Max: 97.7 (21 Sep 2022 04:36)  HR: 74 (21 Sep 2022 11:47) (69 - 74)  BP: 110/62 (21 Sep 2022 11:47) (110/62 - 121/76)  BP(mean): --  RR: 20 (21 Sep 2022 11:47) (20 - 20)  SpO2: 100% (21 Sep 2022 11:47) (97% - 100%)    Parameters below as of 21 Sep 2022 04:36  Patient On (Oxygen Delivery Method): room air        CONSTITUTIONAL: NAD, elderly man sitting up in bed   ENMT: Moist oral mucosa, EOMI   RESPIRATORY: Normal respiratory effort; lungs are clear to auscultation bilaterally  CARDIOVASCULAR: Regular rate and rhythm, normal S1 and S2, No lower extremity edema  ABDOMEN: Nontender to palpation, normoactive bowel sounds  MUSCULOSKELETAL:  No clubbing or cyanosis of digits; R shoulder pain with ROM   PSYCH: A+O to person, place, and time; affect appropriate  NEUROLOGY: No gross sensory or motor deficits   SKIN: Warm and dry       LABS:                        9.3    3.52  )-----------( 141      ( 21 Sep 2022 07:45 )             28.9     09-21    142  |  108<H>  |  28.6<H>  ----------------------------<  115<H>  4.1   |  22.0  |  0.94    Ca    7.6<L>      21 Sep 2022 07:45    TPro  5.9<L>  /  Alb  3.0<L>  /  TBili  <0.2<L>  /  DBili  0.1  /  AST  26  /  ALT  17  /  AlkPhos  50  09-21    PT/INR - ( 21 Sep 2022 07:45 )   PT: 24.1 sec;   INR: 2.06 ratio           CARDIAC MARKERS ( 20 Sep 2022 05:33 )  x     / x     / 760 U/L / x     / 2.4 ng/mL

## 2022-09-22 LAB
ALBUMIN SERPL ELPH-MCNC: 2.9 G/DL — LOW (ref 3.3–5.2)
ALP SERPL-CCNC: 46 U/L — SIGNIFICANT CHANGE UP (ref 40–120)
ALT FLD-CCNC: 16 U/L — SIGNIFICANT CHANGE UP
ANION GAP SERPL CALC-SCNC: 10 MMOL/L — SIGNIFICANT CHANGE UP (ref 5–17)
AST SERPL-CCNC: 21 U/L — SIGNIFICANT CHANGE UP
BASOPHILS # BLD AUTO: 0.02 K/UL — SIGNIFICANT CHANGE UP (ref 0–0.2)
BASOPHILS NFR BLD AUTO: 0.7 % — SIGNIFICANT CHANGE UP (ref 0–2)
BILIRUB DIRECT SERPL-MCNC: 0.1 MG/DL — SIGNIFICANT CHANGE UP (ref 0–0.3)
BILIRUB INDIRECT FLD-MCNC: 0.1 MG/DL — LOW (ref 0.2–1)
BILIRUB SERPL-MCNC: 0.2 MG/DL — LOW (ref 0.4–2)
BUN SERPL-MCNC: 27.7 MG/DL — HIGH (ref 8–20)
CALCIUM SERPL-MCNC: 7.7 MG/DL — LOW (ref 8.4–10.5)
CHLORIDE SERPL-SCNC: 106 MMOL/L — SIGNIFICANT CHANGE UP (ref 98–107)
CO2 SERPL-SCNC: 22 MMOL/L — SIGNIFICANT CHANGE UP (ref 22–29)
CREAT SERPL-MCNC: 0.94 MG/DL — SIGNIFICANT CHANGE UP (ref 0.5–1.3)
EGFR: 77 ML/MIN/1.73M2 — SIGNIFICANT CHANGE UP
EOSINOPHIL # BLD AUTO: 0.23 K/UL — SIGNIFICANT CHANGE UP (ref 0–0.5)
EOSINOPHIL NFR BLD AUTO: 7.9 % — HIGH (ref 0–6)
GLUCOSE SERPL-MCNC: 116 MG/DL — HIGH (ref 70–99)
HCT VFR BLD CALC: 26 % — LOW (ref 39–50)
HGB BLD-MCNC: 8.5 G/DL — LOW (ref 13–17)
IMM GRANULOCYTES NFR BLD AUTO: 0.7 % — SIGNIFICANT CHANGE UP (ref 0–0.9)
INR BLD: 2.05 RATIO — HIGH (ref 0.88–1.16)
LYMPHOCYTES # BLD AUTO: 0.7 K/UL — LOW (ref 1–3.3)
LYMPHOCYTES # BLD AUTO: 24 % — SIGNIFICANT CHANGE UP (ref 13–44)
MCHC RBC-ENTMCNC: 31.4 PG — SIGNIFICANT CHANGE UP (ref 27–34)
MCHC RBC-ENTMCNC: 32.7 GM/DL — SIGNIFICANT CHANGE UP (ref 32–36)
MCV RBC AUTO: 95.9 FL — SIGNIFICANT CHANGE UP (ref 80–100)
MONOCYTES # BLD AUTO: 0.38 K/UL — SIGNIFICANT CHANGE UP (ref 0–0.9)
MONOCYTES NFR BLD AUTO: 13 % — SIGNIFICANT CHANGE UP (ref 2–14)
NEUTROPHILS # BLD AUTO: 1.57 K/UL — LOW (ref 1.8–7.4)
NEUTROPHILS NFR BLD AUTO: 53.7 % — SIGNIFICANT CHANGE UP (ref 43–77)
PLATELET # BLD AUTO: 151 K/UL — SIGNIFICANT CHANGE UP (ref 150–400)
POTASSIUM SERPL-MCNC: 3.8 MMOL/L — SIGNIFICANT CHANGE UP (ref 3.5–5.3)
POTASSIUM SERPL-SCNC: 3.8 MMOL/L — SIGNIFICANT CHANGE UP (ref 3.5–5.3)
PROT SERPL-MCNC: 5.7 G/DL — LOW (ref 6.6–8.7)
PROTHROM AB SERPL-ACNC: 23.9 SEC — HIGH (ref 10.5–13.4)
RBC # BLD: 2.71 M/UL — LOW (ref 4.2–5.8)
RBC # FLD: 14.3 % — SIGNIFICANT CHANGE UP (ref 10.3–14.5)
SARS-COV-2 RNA SPEC QL NAA+PROBE: DETECTED
SODIUM SERPL-SCNC: 138 MMOL/L — SIGNIFICANT CHANGE UP (ref 135–145)
WBC # BLD: 2.92 K/UL — LOW (ref 3.8–10.5)
WBC # FLD AUTO: 2.92 K/UL — LOW (ref 3.8–10.5)

## 2022-09-22 PROCEDURE — 99232 SBSQ HOSP IP/OBS MODERATE 35: CPT

## 2022-09-22 RX ADMIN — Medication 5 MILLIGRAM(S): at 05:45

## 2022-09-22 RX ADMIN — PANTOPRAZOLE SODIUM 40 MILLIGRAM(S): 20 TABLET, DELAYED RELEASE ORAL at 05:45

## 2022-09-22 RX ADMIN — Medication 81 MILLIGRAM(S): at 11:41

## 2022-09-22 RX ADMIN — TAMSULOSIN HYDROCHLORIDE 0.8 MILLIGRAM(S): 0.4 CAPSULE ORAL at 21:18

## 2022-09-22 RX ADMIN — Medication 375 MILLIGRAM(S): at 17:47

## 2022-09-22 RX ADMIN — LISINOPRIL 40 MILLIGRAM(S): 2.5 TABLET ORAL at 05:45

## 2022-09-22 RX ADMIN — CARVEDILOL PHOSPHATE 12.5 MILLIGRAM(S): 80 CAPSULE, EXTENDED RELEASE ORAL at 17:47

## 2022-09-22 RX ADMIN — CARVEDILOL PHOSPHATE 12.5 MILLIGRAM(S): 80 CAPSULE, EXTENDED RELEASE ORAL at 05:45

## 2022-09-22 RX ADMIN — APIXABAN 5 MILLIGRAM(S): 2.5 TABLET, FILM COATED ORAL at 17:47

## 2022-09-22 RX ADMIN — Medication 375 MILLIGRAM(S): at 05:45

## 2022-09-22 RX ADMIN — AMLODIPINE BESYLATE 5 MILLIGRAM(S): 2.5 TABLET ORAL at 05:46

## 2022-09-22 RX ADMIN — APIXABAN 5 MILLIGRAM(S): 2.5 TABLET, FILM COATED ORAL at 05:45

## 2022-09-22 RX ADMIN — ATORVASTATIN CALCIUM 40 MILLIGRAM(S): 80 TABLET, FILM COATED ORAL at 21:18

## 2022-09-22 NOTE — DIETITIAN INITIAL EVALUATION ADULT - OTHER INFO
89y man with HTN, HLD, paroxsymal Afib on Eliquis, sick sinus syndrome s/p PPM (2005 with generator change 2013), Prostate cancer s/p TURP, Colon Ca s/p colectomy 1992,  presents for evaluation s/p fall. Patient states he was trying to get into bed this evening but fell secondary to b/l knee stiffness. States he could not get up so lay on the floor and fell asleep for a few hours. per wife- she found him laying on his right side, called 911 because she could not help him up. Currently only endorsing bilateral knee pain. Pt found COVID+, plan for LUIS, d/c planning in progress.

## 2022-09-22 NOTE — DIETITIAN INITIAL EVALUATION ADULT - NSICDXPASTSURGICALHX_GEN_ALL_CORE_FT
PAST SURGICAL HISTORY:  H/O total knee replacement, left     History of esophageal surgery     Pacemaker [place] [employer]    S/P cataract surgery     S/P colectomy 1992    S/P TURP

## 2022-09-22 NOTE — PROGRESS NOTE ADULT - ASSESSMENT
89y man with HTN, HLD, paroxsymal Afib on Eliquis, sick sinus syndrome s/p PPM (2005 with generator change 2013), Prostate cancer s/p TURP, Colon Ca s/p colectomy 1992, presents for evaluation s/p fall.     Elevated Troponin due to fall or leak  New CMP   Etiology unclear   ECHO showing EF 40-45% with grade 2 DD   c/w ASA, Statin, BB  c/w Lisinopril   Appreciate Cardiology consult   Per Cardio, pt does not wish for inpt work up at this time   Recommend medical management and consider ischemic work up as outpt     COVID-19 infection   s/p Remdesivir x 3 doses   ID consult appreciated   symptomatic treatment   Isolation   no resp symptoms or hypoxia    Rhabdomyolysis   Fall, due to weakness  CPK improving   PT consult appreciated     HTN, HLD  Resume Amlodipine and Lisinopril  Hold Lasix      pA fib, s/p PPM  Cont Eliquis  AV paced rhythm  not on any AVN blockers at home    Leg weakness  X ray LE: Prostate seeds. Rounded sclerotic lesion seen lateral right iliac wing and right ischium, suspect metastatic disease.   Outpt follow up with Oncology     R Shoulder pain after fall   X rays with no fracture   c/w symptom management     DVT: Eliquis   Dispo: LUIS   Discussed with daughter Raeann [phone]

## 2022-09-22 NOTE — PROGRESS NOTE ADULT - SUBJECTIVE AND OBJECTIVE BOX
[facility] Division of Hospital Medicine    Chief Complaint:  weakness and fall     SUBJECTIVE / OVERNIGHT EVENTS:   Pt says he feels better   No complaints     Patient denies chest pain, SOB, abd pain, N/V, fever, chills, dysuria or any other complaints. All remainder ROS negative.     MEDICATIONS  (STANDING):  amLODIPine   Tablet 5 milliGRAM(s) Oral daily  apixaban 5 milliGRAM(s) Oral two times a day  aspirin enteric coated 81 milliGRAM(s) Oral daily  atorvastatin 40 milliGRAM(s) Oral at bedtime  carvedilol 12.5 milliGRAM(s) Oral every 12 hours  lisinopril 40 milliGRAM(s) Oral daily  naproxen 375 milliGRAM(s) Oral two times a day  pantoprazole    Tablet 40 milliGRAM(s) Oral before breakfast  predniSONE   Tablet 5 milliGRAM(s) Oral daily  tamsulosin 0.8 milliGRAM(s) Oral at bedtime    MEDICATIONS  (PRN):  acetaminophen     Tablet .. 650 milliGRAM(s) Oral every 6 hours PRN Temp greater or equal to 38C (100.4F), Mild Pain (1 - 3)        I&O's Summary    21 Sep 2022 07:01  -  22 Sep 2022 07:00  --------------------------------------------------------  IN: 0 mL / OUT: 1450 mL / NET: -1450 mL        PHYSICAL EXAM:  Vital Signs Last 24 Hrs  T(C): 36.7 (22 Sep 2022 10:19), Max: 36.8 (22 Sep 2022 05:35)  T(F): 98 (22 Sep 2022 10:19), Max: 98.3 (22 Sep 2022 05:35)  HR: 78 (22 Sep 2022 10:19) (72 - 78)  BP: 107/59 (22 Sep 2022 10:19) (102/66 - 118/72)  BP(mean): --  RR: 18 (22 Sep 2022 10:19) (18 - 18)  SpO2: 95% (22 Sep 2022 10:19) (95% - 98%)    Parameters below as of 22 Sep 2022 10:19  Patient On (Oxygen Delivery Method): room air        CONSTITUTIONAL: NAD, elderly man sitting up in bed   ENMT: Moist oral mucosa, EOMI   RESPIRATORY: Normal respiratory effort; lungs are clear to auscultation bilaterally  CARDIOVASCULAR: Regular rate and rhythm, normal S1 and S2, No lower extremity edema  ABDOMEN: Nontender to palpation, normoactive bowel sounds  MUSCULOSKELETAL:  No clubbing or cyanosis of digits; R shoulder pain with ROM improved   PSYCH: A+O to person, place, and time; affect appropriate  NEUROLOGY: No gross sensory or motor deficits   SKIN: Warm and dry       LABS:                        8.5    2.92  )-----------( 151      ( 22 Sep 2022 07:12 )             26.0     09-22    138  |  106  |  27.7<H>  ----------------------------<  116<H>  3.8   |  22.0  |  0.94    Ca    7.7<L>      22 Sep 2022 07:12    TPro  5.7<L>  /  Alb  2.9<L>  /  TBili  0.2<L>  /  DBili  0.1  /  AST  21  /  ALT  16  /  AlkPhos  46  09-22    PT/INR - ( 22 Sep 2022 07:12 )   PT: 23.9 sec;   INR: 2.05 ratio

## 2022-09-22 NOTE — DIETITIAN INITIAL EVALUATION ADULT - PERTINENT MEDS FT
MEDICATIONS  (STANDING):  amLODIPine   Tablet 5 milliGRAM(s) Oral daily  apixaban 5 milliGRAM(s) Oral two times a day  aspirin enteric coated 81 milliGRAM(s) Oral daily  atorvastatin 40 milliGRAM(s) Oral at bedtime  carvedilol 12.5 milliGRAM(s) Oral every 12 hours  lisinopril 40 milliGRAM(s) Oral daily  naproxen 375 milliGRAM(s) Oral two times a day  pantoprazole    Tablet 40 milliGRAM(s) Oral before breakfast  predniSONE   Tablet 5 milliGRAM(s) Oral daily  tamsulosin 0.8 milliGRAM(s) Oral at bedtime    MEDICATIONS  (PRN):  acetaminophen     Tablet .. 650 milliGRAM(s) Oral every 6 hours PRN Temp greater or equal to 38C (100.4F), Mild Pain (1 - 3)

## 2022-09-22 NOTE — DIETITIAN INITIAL EVALUATION ADULT - ORAL INTAKE PTA/DIET HISTORY
Pt states not following a diet at home, eats frozen breakfast biscuit and receives meals on wheels for dinner. Pt states weight has remained stable ~208lbs x1 yr, unclear accuracy of admission weight. RD bedscale weight consistent with stated weight per Pt. Pt with good appetite/PO intake in house completing >75% of meals. Discussed importance of adequate protein-energy needs to optimize nutrition status, low Na education provided. Food preferences obtained.

## 2022-09-22 NOTE — DIETITIAN INITIAL EVALUATION ADULT - PERTINENT LABORATORY DATA
09-22    138  |  106  |  27.7<H>  ----------------------------<  116<H>  3.8   |  22.0  |  0.94    Ca    7.7<L>      22 Sep 2022 07:12    TPro  5.7<L>  /  Alb  2.9<L>  /  TBili  0.2<L>  /  DBili  0.1  /  AST  21  /  ALT  16  /  AlkPhos  46  09-22

## 2022-09-23 ENCOUNTER — TRANSCRIPTION ENCOUNTER (OUTPATIENT)
Age: 87
End: 2022-09-23

## 2022-09-23 VITALS
TEMPERATURE: 98 F | DIASTOLIC BLOOD PRESSURE: 72 MMHG | RESPIRATION RATE: 18 BRPM | SYSTOLIC BLOOD PRESSURE: 139 MMHG | OXYGEN SATURATION: 98 % | HEART RATE: 70 BPM

## 2022-09-23 LAB
ALBUMIN SERPL ELPH-MCNC: 3 G/DL — LOW (ref 3.3–5.2)
ALP SERPL-CCNC: 46 U/L — SIGNIFICANT CHANGE UP (ref 40–120)
ALT FLD-CCNC: 19 U/L — SIGNIFICANT CHANGE UP
AST SERPL-CCNC: 21 U/L — SIGNIFICANT CHANGE UP
BILIRUB DIRECT SERPL-MCNC: 0.1 MG/DL — SIGNIFICANT CHANGE UP (ref 0–0.3)
BILIRUB INDIRECT FLD-MCNC: 0.1 MG/DL — LOW (ref 0.2–1)
BILIRUB SERPL-MCNC: 0.2 MG/DL — LOW (ref 0.4–2)
CREAT SERPL-MCNC: 0.77 MG/DL — SIGNIFICANT CHANGE UP (ref 0.5–1.3)
CULTURE RESULTS: SIGNIFICANT CHANGE UP
CULTURE RESULTS: SIGNIFICANT CHANGE UP
EGFR: 85 ML/MIN/1.73M2 — SIGNIFICANT CHANGE UP
INR BLD: 1.77 RATIO — HIGH (ref 0.88–1.16)
PROT SERPL-MCNC: 5.7 G/DL — LOW (ref 6.6–8.7)
PROTHROM AB SERPL-ACNC: 20.6 SEC — HIGH (ref 10.5–13.4)
SPECIMEN SOURCE: SIGNIFICANT CHANGE UP
SPECIMEN SOURCE: SIGNIFICANT CHANGE UP

## 2022-09-23 PROCEDURE — 99285 EMERGENCY DEPT VISIT HI MDM: CPT

## 2022-09-23 PROCEDURE — 83735 ASSAY OF MAGNESIUM: CPT

## 2022-09-23 PROCEDURE — 82565 ASSAY OF CREATININE: CPT

## 2022-09-23 PROCEDURE — 93005 ELECTROCARDIOGRAM TRACING: CPT

## 2022-09-23 PROCEDURE — 96360 HYDRATION IV INFUSION INIT: CPT

## 2022-09-23 PROCEDURE — 87040 BLOOD CULTURE FOR BACTERIA: CPT

## 2022-09-23 PROCEDURE — 93970 EXTREMITY STUDY: CPT

## 2022-09-23 PROCEDURE — 73562 X-RAY EXAM OF KNEE 3: CPT

## 2022-09-23 PROCEDURE — 82550 ASSAY OF CK (CPK): CPT

## 2022-09-23 PROCEDURE — 73522 X-RAY EXAM HIPS BI 3-4 VIEWS: CPT

## 2022-09-23 PROCEDURE — 86140 C-REACTIVE PROTEIN: CPT

## 2022-09-23 PROCEDURE — 85652 RBC SED RATE AUTOMATED: CPT

## 2022-09-23 PROCEDURE — 73030 X-RAY EXAM OF SHOULDER: CPT

## 2022-09-23 PROCEDURE — U0005: CPT

## 2022-09-23 PROCEDURE — 82553 CREATINE MB FRACTION: CPT

## 2022-09-23 PROCEDURE — 36415 COLL VENOUS BLD VENIPUNCTURE: CPT

## 2022-09-23 PROCEDURE — 71045 X-RAY EXAM CHEST 1 VIEW: CPT

## 2022-09-23 PROCEDURE — 84484 ASSAY OF TROPONIN QUANT: CPT

## 2022-09-23 PROCEDURE — 70450 CT HEAD/BRAIN W/O DYE: CPT

## 2022-09-23 PROCEDURE — 83615 LACTATE (LD) (LDH) ENZYME: CPT

## 2022-09-23 PROCEDURE — 83605 ASSAY OF LACTIC ACID: CPT

## 2022-09-23 PROCEDURE — 81001 URINALYSIS AUTO W/SCOPE: CPT

## 2022-09-23 PROCEDURE — 80076 HEPATIC FUNCTION PANEL: CPT

## 2022-09-23 PROCEDURE — 82728 ASSAY OF FERRITIN: CPT

## 2022-09-23 PROCEDURE — 85025 COMPLETE CBC W/AUTO DIFF WBC: CPT

## 2022-09-23 PROCEDURE — 85610 PROTHROMBIN TIME: CPT

## 2022-09-23 PROCEDURE — 80053 COMPREHEN METABOLIC PANEL: CPT

## 2022-09-23 PROCEDURE — 80061 LIPID PANEL: CPT

## 2022-09-23 PROCEDURE — 80048 BASIC METABOLIC PNL TOTAL CA: CPT

## 2022-09-23 PROCEDURE — 87086 URINE CULTURE/COLONY COUNT: CPT

## 2022-09-23 PROCEDURE — 0225U NFCT DS DNA&RNA 21 SARSCOV2: CPT

## 2022-09-23 PROCEDURE — 99239 HOSP IP/OBS DSCHRG MGMT >30: CPT

## 2022-09-23 PROCEDURE — U0003: CPT

## 2022-09-23 PROCEDURE — 93306 TTE W/DOPPLER COMPLETE: CPT

## 2022-09-23 PROCEDURE — 85379 FIBRIN DEGRADATION QUANT: CPT

## 2022-09-23 PROCEDURE — 73552 X-RAY EXAM OF FEMUR 2/>: CPT

## 2022-09-23 RX ORDER — ACETAMINOPHEN 500 MG
2 TABLET ORAL
Qty: 0 | Refills: 0 | DISCHARGE
Start: 2022-09-23

## 2022-09-23 RX ORDER — ATORVASTATIN CALCIUM 80 MG/1
1 TABLET, FILM COATED ORAL
Qty: 0 | Refills: 0 | DISCHARGE
Start: 2022-09-23

## 2022-09-23 RX ADMIN — LISINOPRIL 40 MILLIGRAM(S): 2.5 TABLET ORAL at 05:24

## 2022-09-23 RX ADMIN — APIXABAN 5 MILLIGRAM(S): 2.5 TABLET, FILM COATED ORAL at 05:24

## 2022-09-23 RX ADMIN — PANTOPRAZOLE SODIUM 40 MILLIGRAM(S): 20 TABLET, DELAYED RELEASE ORAL at 05:24

## 2022-09-23 RX ADMIN — Medication 81 MILLIGRAM(S): at 12:35

## 2022-09-23 RX ADMIN — Medication 5 MILLIGRAM(S): at 05:24

## 2022-09-23 RX ADMIN — Medication 375 MILLIGRAM(S): at 05:24

## 2022-09-23 RX ADMIN — AMLODIPINE BESYLATE 5 MILLIGRAM(S): 2.5 TABLET ORAL at 05:25

## 2022-09-23 RX ADMIN — CARVEDILOL PHOSPHATE 12.5 MILLIGRAM(S): 80 CAPSULE, EXTENDED RELEASE ORAL at 05:24

## 2022-09-23 RX ADMIN — Medication 650 MILLIGRAM(S): at 08:48

## 2022-09-23 NOTE — DISCHARGE NOTE PROVIDER - NSDCMRMEDTOKEN_GEN_ALL_CORE_FT
acetaminophen 325 mg oral tablet: 2 tab(s) orally every 6 hours, As needed, Temp greater or equal to 38C (100.4F), Mild Pain (1 - 3)  amLODIPine 5 mg oral tablet: 1 tab(s) orally once a day   aspirin 81 mg oral tablet: 1 tab(s) orally once a day  atorvastatin 40 mg oral tablet: 1 tab(s) orally once a day (at bedtime)  Coreg 12.5 mg oral tablet: 1 tab(s) orally 2 times a day   Eliquis 5 mg oral tablet: 1 tab(s) orally 2 times a day  Flomax 0.4 mg oral capsule: 2 cap(s) orally once a day  Lasix 40 mg oral tablet: 1 tab(s) orally once a day  lisinopril 40 mg oral tablet: 1 tab(s) orally once a day  methocarbamol 750 mg oral tablet: 1 tab(s) orally 3 times a day  potassium chloride 20 mEq oral granule, extended release: orally 3 times a day  predniSONE 5 mg oral tablet: 1 tab(s) orally once a day  Protonix 40 mg oral delayed release tablet: 1 tab(s) orally once a day

## 2022-09-23 NOTE — DISCHARGE NOTE PROVIDER - ATTENDING DISCHARGE PHYSICAL EXAMINATION:
Vital Signs Last 24 Hrs  T(C): 36.9 (23 Sep 2022 11:01), Max: 36.9 (23 Sep 2022 11:01)  T(F): 98.4 (23 Sep 2022 11:01), Max: 98.4 (23 Sep 2022 11:01)  HR: 98 (23 Sep 2022 11:01) (73 - 98)  BP: 126/68 (23 Sep 2022 11:01) (126/68 - 136/79)  BP(mean): --  RR: 18 (23 Sep 2022 11:01) (18 - 18)  SpO2: 98% (23 Sep 2022 11:01) (96% - 98%)    CONSTITUTIONAL: NAD, elderly man sitting up in bed   ENMT: Moist oral mucosa, EOMI   RESPIRATORY: Normal respiratory effort; lungs are clear to auscultation bilaterally  CARDIOVASCULAR: Regular rate and rhythm, normal S1 and S2, No lower extremity edema  ABDOMEN: Nontender to palpation, normoactive bowel sounds  MUSCULOSKELETAL:  No clubbing or cyanosis of digits; R shoulder pain improved   PSYCH: A+O to person, place, and time; affect appropriate  NEUROLOGY: No gross sensory or motor deficits   SKIN: Warm and dry

## 2022-09-23 NOTE — DISCHARGE NOTE PROVIDER - NSDCCPCAREPLAN_GEN_ALL_CORE_FT
PRINCIPAL DISCHARGE DIAGNOSIS  Diagnosis: 2019 novel coronavirus disease (COVID-19)  Assessment and Plan of Treatment: s/p Remdesivir x 3 doses. ID consult appreciated. Pt currently asymptomatic.      SECONDARY DISCHARGE DIAGNOSES  Diagnosis: Elevated troponin  Assessment and Plan of Treatment: New Cardiomyopathy - ECHO showing EF 40-45% with grade 2 DD. c/w ASA, Statin, BB, ACEI. Appreciate Cardiology consult, Recommend medical management and consider ischemic work up as outpt.

## 2022-09-23 NOTE — DISCHARGE NOTE PROVIDER - CARE PROVIDER_API CALL
Rosa Rai)  Cardiovascular Disease; Internal Medicine  [address], [address]  [place] [address]  Phone: [phone]  Fax: [phone]  Follow Up Time:

## 2022-09-23 NOTE — DISCHARGE NOTE PROVIDER - HOSPITAL COURSE
89y man with HTN, HLD, paroxsymal Afib on Eliquis, sick sinus syndrome s/p PPM (2005 with generator change 2013), Prostate cancer s/p TURP, Colon Ca s/p colectomy 1992, presented for evaluation s/p fall.   Elevated Troponin due to fall or leak / New CMP - ECHO showing EF 40-45% with grade 2 DD. c/w ASA, Statin, BB, ACEI. Appreciate Cardiology consult, Recommend medical management and consider ischemic work up as outpt.   COVID-19 infection - s/p Remdesivir x 3 doses. ID consult appreciated. Pt currently asymptomatic.   Rhabdomyolysis / Fall, due to weakness - CPK improving  HTN, HLD  pA fib, s/p PPM  Leg weakness - X ray LE: Prostate seeds. Rounded sclerotic lesion seen lateral right iliac wing and right ischium, suspect metastatic disease. Outpt follow up with Oncology

## 2022-09-23 NOTE — DISCHARGE NOTE PROVIDER - NSDCFUADDAPPT_GEN_ALL_CORE_FT
[facility] will call pt per clerical  No [facility] for home care due to LUIS  No meds to bed from [facility], no medication review needed due to LUIS   yellow STAR/folder pack given

## 2023-02-04 ENCOUNTER — EMERGENCY (EMERGENCY)
Facility: HOSPITAL | Age: 88
LOS: 1 days | Discharge: HOME | End: 2023-02-04
Attending: EMERGENCY MEDICINE
Payer: MEDICARE

## 2023-02-04 VITALS
SYSTOLIC BLOOD PRESSURE: 145 MMHG | DIASTOLIC BLOOD PRESSURE: 66 MMHG | WEIGHT: 139.99 LBS | RESPIRATION RATE: 16 BRPM | OXYGEN SATURATION: 96 % | TEMPERATURE: 98 F | HEART RATE: 85 BPM

## 2023-02-04 DIAGNOSIS — Z98.890 OTHER SPECIFIED POSTPROCEDURAL STATES: Chronic | ICD-10-CM

## 2023-02-04 DIAGNOSIS — Z90.79 ACQUIRED ABSENCE OF OTHER GENITAL ORGAN(S): Chronic | ICD-10-CM

## 2023-02-04 DIAGNOSIS — Z90.49 ACQUIRED ABSENCE OF OTHER SPECIFIED PARTS OF DIGESTIVE TRACT: Chronic | ICD-10-CM

## 2023-02-04 DIAGNOSIS — Z96.652 PRESENCE OF LEFT ARTIFICIAL KNEE JOINT: Chronic | ICD-10-CM

## 2023-02-04 DIAGNOSIS — Z98.49 CATARACT EXTRACTION STATUS, UNSPECIFIED EYE: Chronic | ICD-10-CM

## 2023-02-04 DIAGNOSIS — Z95.0 PRESENCE OF CARDIAC PACEMAKER: Chronic | ICD-10-CM

## 2023-02-04 LAB
ALBUMIN SERPL ELPH-MCNC: 3.2 G/DL — LOW (ref 3.3–5.2)
ALP SERPL-CCNC: 215 U/L — HIGH (ref 40–120)
ALT FLD-CCNC: 13 U/L — SIGNIFICANT CHANGE UP
ANION GAP SERPL CALC-SCNC: 13 MMOL/L — SIGNIFICANT CHANGE UP (ref 5–17)
APPEARANCE UR: CLEAR — SIGNIFICANT CHANGE UP
AST SERPL-CCNC: 35 U/L — SIGNIFICANT CHANGE UP
BASOPHILS # BLD AUTO: 0.07 K/UL — SIGNIFICANT CHANGE UP (ref 0–0.2)
BASOPHILS NFR BLD AUTO: 1.7 % — SIGNIFICANT CHANGE UP (ref 0–2)
BILIRUB SERPL-MCNC: 0.2 MG/DL — LOW (ref 0.4–2)
BILIRUB UR-MCNC: NEGATIVE — SIGNIFICANT CHANGE UP
BUN SERPL-MCNC: 30.6 MG/DL — HIGH (ref 8–20)
CALCIUM SERPL-MCNC: 8.4 MG/DL — SIGNIFICANT CHANGE UP (ref 8.4–10.5)
CHLORIDE SERPL-SCNC: 106 MMOL/L — SIGNIFICANT CHANGE UP (ref 96–108)
CO2 SERPL-SCNC: 21 MMOL/L — LOW (ref 22–29)
COLOR SPEC: YELLOW — SIGNIFICANT CHANGE UP
CREAT SERPL-MCNC: 1.2 MG/DL — SIGNIFICANT CHANGE UP (ref 0.5–1.3)
DIFF PNL FLD: NEGATIVE — SIGNIFICANT CHANGE UP
EGFR: 57 ML/MIN/1.73M2 — LOW
EOSINOPHIL # BLD AUTO: 0.04 K/UL — SIGNIFICANT CHANGE UP (ref 0–0.5)
EOSINOPHIL NFR BLD AUTO: 0.9 % — SIGNIFICANT CHANGE UP (ref 0–6)
FLUAV AG NPH QL: SIGNIFICANT CHANGE UP
FLUBV AG NPH QL: SIGNIFICANT CHANGE UP
GLUCOSE SERPL-MCNC: 158 MG/DL — HIGH (ref 70–99)
GLUCOSE UR QL: NEGATIVE MG/DL — SIGNIFICANT CHANGE UP
HCT VFR BLD CALC: 26.3 % — LOW (ref 39–50)
HGB BLD-MCNC: 8 G/DL — LOW (ref 13–17)
KETONES UR-MCNC: NEGATIVE — SIGNIFICANT CHANGE UP
LEUKOCYTE ESTERASE UR-ACNC: NEGATIVE — SIGNIFICANT CHANGE UP
LYMPHOCYTES # BLD AUTO: 0.22 K/UL — LOW (ref 1–3.3)
LYMPHOCYTES # BLD AUTO: 5.2 % — LOW (ref 13–44)
MANUAL SMEAR VERIFICATION: SIGNIFICANT CHANGE UP
MCHC RBC-ENTMCNC: 28.3 PG — SIGNIFICANT CHANGE UP (ref 27–34)
MCHC RBC-ENTMCNC: 30.4 GM/DL — LOW (ref 32–36)
MCV RBC AUTO: 92.9 FL — SIGNIFICANT CHANGE UP (ref 80–100)
MONOCYTES # BLD AUTO: 0.54 K/UL — SIGNIFICANT CHANGE UP (ref 0–0.9)
MONOCYTES NFR BLD AUTO: 13 % — SIGNIFICANT CHANGE UP (ref 2–14)
MYELOCYTES NFR BLD: 0.9 % — HIGH (ref 0–0)
NEUTROPHILS # BLD AUTO: 3.22 K/UL — SIGNIFICANT CHANGE UP (ref 1.8–7.4)
NEUTROPHILS NFR BLD AUTO: 77.4 % — HIGH (ref 43–77)
NITRITE UR-MCNC: NEGATIVE — SIGNIFICANT CHANGE UP
NRBC # BLD: 2 /100 — HIGH (ref 0–0)
PH UR: 6 — SIGNIFICANT CHANGE UP (ref 5–8)
PLAT MORPH BLD: NORMAL — SIGNIFICANT CHANGE UP
PLATELET # BLD AUTO: 184 K/UL — SIGNIFICANT CHANGE UP (ref 150–400)
POIKILOCYTOSIS BLD QL AUTO: SLIGHT — SIGNIFICANT CHANGE UP
POLYCHROMASIA BLD QL SMEAR: SIGNIFICANT CHANGE UP
POTASSIUM SERPL-MCNC: 4.9 MMOL/L — SIGNIFICANT CHANGE UP (ref 3.5–5.3)
POTASSIUM SERPL-SCNC: 4.9 MMOL/L — SIGNIFICANT CHANGE UP (ref 3.5–5.3)
PROT SERPL-MCNC: 7.1 G/DL — SIGNIFICANT CHANGE UP (ref 6.6–8.7)
PROT UR-MCNC: NEGATIVE — SIGNIFICANT CHANGE UP
RBC # BLD: 2.83 M/UL — LOW (ref 4.2–5.8)
RBC # FLD: 14.7 % — HIGH (ref 10.3–14.5)
RBC BLD AUTO: ABNORMAL
RSV RNA NPH QL NAA+NON-PROBE: SIGNIFICANT CHANGE UP
SARS-COV-2 RNA SPEC QL NAA+PROBE: SIGNIFICANT CHANGE UP
SODIUM SERPL-SCNC: 140 MMOL/L — SIGNIFICANT CHANGE UP (ref 135–145)
SP GR SPEC: 1.01 — SIGNIFICANT CHANGE UP (ref 1.01–1.02)
UROBILINOGEN FLD QL: NEGATIVE MG/DL — SIGNIFICANT CHANGE UP
VARIANT LYMPHS # BLD: 0.9 % — SIGNIFICANT CHANGE UP (ref 0–6)
WBC # BLD: 4.16 K/UL — SIGNIFICANT CHANGE UP (ref 3.8–10.5)
WBC # FLD AUTO: 4.16 K/UL — SIGNIFICANT CHANGE UP (ref 3.8–10.5)

## 2023-02-04 PROCEDURE — 93010 ELECTROCARDIOGRAM REPORT: CPT

## 2023-02-04 PROCEDURE — 71045 X-RAY EXAM CHEST 1 VIEW: CPT | Mod: 26

## 2023-02-04 PROCEDURE — 99284 EMERGENCY DEPT VISIT MOD MDM: CPT

## 2023-02-04 RX ORDER — TRAMADOL HYDROCHLORIDE 50 MG/1
25 TABLET ORAL ONCE
Refills: 0 | Status: DISCONTINUED | OUTPATIENT
Start: 2023-02-04 | End: 2023-02-04

## 2023-02-04 RX ORDER — SODIUM CHLORIDE 9 MG/ML
3 INJECTION INTRAMUSCULAR; INTRAVENOUS; SUBCUTANEOUS EVERY 8 HOURS
Refills: 0 | Status: DISCONTINUED | OUTPATIENT
Start: 2023-02-04 | End: 2023-02-12

## 2023-02-04 RX ORDER — SODIUM CHLORIDE 9 MG/ML
1000 INJECTION INTRAMUSCULAR; INTRAVENOUS; SUBCUTANEOUS ONCE
Refills: 0 | Status: COMPLETED | OUTPATIENT
Start: 2023-02-04 | End: 2023-02-04

## 2023-02-04 RX ADMIN — SODIUM CHLORIDE 1000 MILLILITER(S): 9 INJECTION INTRAMUSCULAR; INTRAVENOUS; SUBCUTANEOUS at 20:42

## 2023-02-04 NOTE — ED ADULT NURSE NOTE - INTERVENTIONS DEFINITIONS
[place] to call system/Call bell, personal items and telephone within reach/Instruct patient to call for assistance/Non-slip footwear when patient is off stretcher/Physically safe environment: no spills, clutter or unnecessary equipment

## 2023-02-04 NOTE — ED ADULT NURSE NOTE - NSICDXPASTSURGICALHX_GEN_ALL_CORE_FT
PAST SURGICAL HISTORY:  H/O total knee replacement, left     History of esophageal surgery     Pacemaker [place] [employer]    S/P cataract surgery     S/P colectomy 1992    S/P TURP

## 2023-02-04 NOTE — ED PROVIDER NOTE - ASSOCIATED PAIN OR INJURY
no discrete location documentation necessary patient denies hypothyroidism and not currently on medication as per pharmacy  tsh on last admission 6.39 (May 2017)  f/u TSH continue imdur, metorpolol, lisinopril (home meds)  increase amlodipine to 10 mg daily  monitor BP  continue DASH diet

## 2023-02-04 NOTE — ED ADULT NURSE NOTE - OBJECTIVE STATEMENT
Pt A&Ox4.  Pt came in by ambulance with daughter and she stated that pt has not been himself. Pts daughter stated pt has been receiving radiation for prostate cancer and has been  preparing to start chemo therapy.  As per pts daughter she stated that pt normally walks with a walker and at this time he has been increasingly weak. Pts daughter stated that tonight pt was unable to get into bed and she was unable to assist him up so she called for help.  Pts daughter also stated that she noticed that pt has been dribbling urine and she noticed a smell.  Pt noted to have a UTI a few months ago. Denies any fever. noted to be 'not himself' for a few days. No SOB at this time. Denies pain.  Will continue to monitor.

## 2023-02-04 NOTE — ED PROVIDER NOTE - CONSTITUTIONAL, MLM
normal... Elderly M awake, alert, oriented to person, place, time/situation and in no apparent distress. A-T Advancement Flap Text: The defect edges were debeveled with a #15 scalpel blade.  Given the location of the defect, shape of the defect and the proximity to free margins an A-T advancement flap was deemed most appropriate.  Using a sterile surgical marker, an appropriate advancement flap was drawn incorporating the defect and placing the expected incisions within the relaxed skin tension lines where possible.    The area thus outlined was incised deep to adipose tissue with a #15 scalpel blade.  The skin margins were undermined to an appropriate distance in all directions utilizing iris scissors.

## 2023-02-04 NOTE — ED PROVIDER NOTE - OBJECTIVE STATEMENT
89 yo M with metastatic prostate Ca currently on Radiotx presents to ED with daughter c/o increased weakness x 1 day-pt was unable to get into bed secondary to generalized weakness. Daughter states pt has had a non-prod cough, but denies any assoc fever, N/V/D.  She is concerned that pt might have another UTI because he was incontinent and dribbling urine earlier today.  No reported CP, SOB, focal weakness or syncope

## 2023-02-04 NOTE — ED ADULT NURSE NOTE - FINAL NURSING ELECTRONIC SIGNATURE
Lac repaired with skin adhesive and steristrips. Will dc home with care instructions. Will also give Adacel injection.
05-Feb-2023 17:46

## 2023-02-04 NOTE — ED PROVIDER NOTE - CLINICAL SUMMARY MEDICAL DECISION MAKING FREE TEXT BOX
89 yo with generalized weakness x 1 day with difficulty ambulating.  Pt with similar presentation with prior UTI.  Will check labs, UA, hydrate and re-eval

## 2023-02-04 NOTE — ED ADULT TRIAGE NOTE - CHIEF COMPLAINT QUOTE
BIBEMS c/o generalized weakness x3 days. Patient states he was unable to ambulate from the chair to the bed today, co BL hip pain from arthritis. Patient denies falling/further pain. HX radiation therapy on Thursday for bone ca. No neuro deficits appreciated at triage.

## 2023-02-04 NOTE — ED PROVIDER NOTE - NSICDXPASTSURGICALHX_GEN_ALL_CORE_FT
PAST SURGICAL HISTORY:  H/O total knee replacement, left     History of esophageal surgery     Pacemaker [place] [employer]    S/P cataract surgery     S/P colectomy 1992    S/P TURP

## 2023-02-04 NOTE — ED ADULT NURSE NOTE - IN THE PAST 12 MONTHS HAVE YOU USED DRUGS OTHER THAN THOSE REQUIRED FOR MEDICAL REASON?
No Oculoplastic Surgeon Procedure Text (E): After obtaining clear surgical margins the patient was sent to oculoplastics for surgical repair.  The patient understands they will receive post-surgical care and follow-up from the referring physician's office.

## 2023-02-05 VITALS
TEMPERATURE: 99 F | OXYGEN SATURATION: 98 % | SYSTOLIC BLOOD PRESSURE: 128 MMHG | DIASTOLIC BLOOD PRESSURE: 62 MMHG | RESPIRATION RATE: 18 BRPM | HEART RATE: 52 BPM

## 2023-02-05 LAB
ALBUMIN SERPL ELPH-MCNC: 3.1 G/DL — LOW (ref 3.3–5.2)
ALP SERPL-CCNC: 183 U/L — HIGH (ref 40–120)
ALT FLD-CCNC: 11 U/L — SIGNIFICANT CHANGE UP
ANION GAP SERPL CALC-SCNC: 10 MMOL/L — SIGNIFICANT CHANGE UP (ref 5–17)
AST SERPL-CCNC: 24 U/L — SIGNIFICANT CHANGE UP
BASOPHILS # BLD AUTO: 0.02 K/UL — SIGNIFICANT CHANGE UP (ref 0–0.2)
BASOPHILS NFR BLD AUTO: 0.5 % — SIGNIFICANT CHANGE UP (ref 0–2)
BILIRUB SERPL-MCNC: 0.2 MG/DL — LOW (ref 0.4–2)
BUN SERPL-MCNC: 23.7 MG/DL — HIGH (ref 8–20)
CALCIUM SERPL-MCNC: 8.2 MG/DL — LOW (ref 8.4–10.5)
CHLORIDE SERPL-SCNC: 105 MMOL/L — SIGNIFICANT CHANGE UP (ref 96–108)
CO2 SERPL-SCNC: 22 MMOL/L — SIGNIFICANT CHANGE UP (ref 22–29)
CREAT SERPL-MCNC: 1.14 MG/DL — SIGNIFICANT CHANGE UP (ref 0.5–1.3)
EGFR: 61 ML/MIN/1.73M2 — SIGNIFICANT CHANGE UP
EOSINOPHIL # BLD AUTO: 0 K/UL — SIGNIFICANT CHANGE UP (ref 0–0.5)
EOSINOPHIL NFR BLD AUTO: 0 % — SIGNIFICANT CHANGE UP (ref 0–6)
GLUCOSE SERPL-MCNC: 141 MG/DL — HIGH (ref 70–99)
HCT VFR BLD CALC: 23.8 % — LOW (ref 39–50)
HGB BLD-MCNC: 7.4 G/DL — LOW (ref 13–17)
IMM GRANULOCYTES NFR BLD AUTO: 2.2 % — HIGH (ref 0–0.9)
LYMPHOCYTES # BLD AUTO: 0.44 K/UL — LOW (ref 1–3.3)
LYMPHOCYTES # BLD AUTO: 11 % — LOW (ref 13–44)
MCHC RBC-ENTMCNC: 28.4 PG — SIGNIFICANT CHANGE UP (ref 27–34)
MCHC RBC-ENTMCNC: 31.1 GM/DL — LOW (ref 32–36)
MCV RBC AUTO: 91.2 FL — SIGNIFICANT CHANGE UP (ref 80–100)
MONOCYTES # BLD AUTO: 0.74 K/UL — SIGNIFICANT CHANGE UP (ref 0–0.9)
MONOCYTES NFR BLD AUTO: 18.5 % — HIGH (ref 2–14)
NEUTROPHILS # BLD AUTO: 2.72 K/UL — SIGNIFICANT CHANGE UP (ref 1.8–7.4)
NEUTROPHILS NFR BLD AUTO: 67.8 % — SIGNIFICANT CHANGE UP (ref 43–77)
PLATELET # BLD AUTO: 154 K/UL — SIGNIFICANT CHANGE UP (ref 150–400)
POTASSIUM SERPL-MCNC: 4.6 MMOL/L — SIGNIFICANT CHANGE UP (ref 3.5–5.3)
POTASSIUM SERPL-SCNC: 4.6 MMOL/L — SIGNIFICANT CHANGE UP (ref 3.5–5.3)
PROT SERPL-MCNC: 6.3 G/DL — LOW (ref 6.6–8.7)
RBC # BLD: 2.61 M/UL — LOW (ref 4.2–5.8)
RBC # FLD: 14.6 % — HIGH (ref 10.3–14.5)
SODIUM SERPL-SCNC: 136 MMOL/L — SIGNIFICANT CHANGE UP (ref 135–145)
WBC # BLD: 4.01 K/UL — SIGNIFICANT CHANGE UP (ref 3.8–10.5)
WBC # FLD AUTO: 4.01 K/UL — SIGNIFICANT CHANGE UP (ref 3.8–10.5)

## 2023-02-05 PROCEDURE — 87637 SARSCOV2&INF A&B&RSV AMP PRB: CPT

## 2023-02-05 PROCEDURE — 93005 ELECTROCARDIOGRAM TRACING: CPT

## 2023-02-05 PROCEDURE — 81003 URINALYSIS AUTO W/O SCOPE: CPT

## 2023-02-05 PROCEDURE — 80053 COMPREHEN METABOLIC PANEL: CPT

## 2023-02-05 PROCEDURE — 99236 HOSP IP/OBS SAME DATE HI 85: CPT

## 2023-02-05 PROCEDURE — 36415 COLL VENOUS BLD VENIPUNCTURE: CPT

## 2023-02-05 PROCEDURE — 85025 COMPLETE CBC W/AUTO DIFF WBC: CPT

## 2023-02-05 PROCEDURE — 70450 CT HEAD/BRAIN W/O DYE: CPT | Mod: MA

## 2023-02-05 PROCEDURE — 99285 EMERGENCY DEPT VISIT HI MDM: CPT | Mod: 25

## 2023-02-05 PROCEDURE — G0378: CPT

## 2023-02-05 PROCEDURE — 70450 CT HEAD/BRAIN W/O DYE: CPT | Mod: 26,MA

## 2023-02-05 PROCEDURE — 71045 X-RAY EXAM CHEST 1 VIEW: CPT

## 2023-02-05 RX ORDER — ZOLPIDEM TARTRATE 10 MG/1
5 TABLET ORAL AT BEDTIME
Refills: 0 | Status: DISCONTINUED | OUTPATIENT
Start: 2023-02-05 | End: 2023-02-05

## 2023-02-05 RX ORDER — ZOLPIDEM TARTRATE 10 MG/1
1 TABLET ORAL
Qty: 0 | Refills: 0 | DISCHARGE
Start: 2023-02-05

## 2023-02-05 RX ORDER — METHOCARBAMOL 500 MG/1
1 TABLET, FILM COATED ORAL
Qty: 0 | Refills: 0 | DISCHARGE

## 2023-02-05 RX ORDER — PANTOPRAZOLE SODIUM 20 MG/1
40 TABLET, DELAYED RELEASE ORAL
Refills: 0 | Status: DISCONTINUED | OUTPATIENT
Start: 2023-02-05 | End: 2023-02-12

## 2023-02-05 RX ORDER — METHOCARBAMOL 500 MG/1
750 TABLET, FILM COATED ORAL EVERY 8 HOURS
Refills: 0 | Status: DISCONTINUED | OUTPATIENT
Start: 2023-02-05 | End: 2023-02-12

## 2023-02-05 RX ORDER — AMLODIPINE BESYLATE 2.5 MG/1
1 TABLET ORAL
Qty: 0 | Refills: 0 | DISCHARGE
Start: 2023-02-05

## 2023-02-05 RX ORDER — LISINOPRIL 2.5 MG/1
40 TABLET ORAL DAILY
Refills: 0 | Status: DISCONTINUED | OUTPATIENT
Start: 2023-02-05 | End: 2023-02-12

## 2023-02-05 RX ORDER — CARVEDILOL PHOSPHATE 80 MG/1
12.5 CAPSULE, EXTENDED RELEASE ORAL EVERY 12 HOURS
Refills: 0 | Status: DISCONTINUED | OUTPATIENT
Start: 2023-02-05 | End: 2023-02-12

## 2023-02-05 RX ORDER — TAMSULOSIN HYDROCHLORIDE 0.4 MG/1
0.4 CAPSULE ORAL AT BEDTIME
Refills: 0 | Status: DISCONTINUED | OUTPATIENT
Start: 2023-02-05 | End: 2023-02-12

## 2023-02-05 RX ORDER — POTASSIUM CHLORIDE 20 MEQ
0 PACKET (EA) ORAL
Qty: 0 | Refills: 0 | DISCHARGE

## 2023-02-05 RX ORDER — ATORVASTATIN CALCIUM 80 MG/1
20 TABLET, FILM COATED ORAL AT BEDTIME
Refills: 0 | Status: DISCONTINUED | OUTPATIENT
Start: 2023-02-05 | End: 2023-02-12

## 2023-02-05 RX ORDER — POTASSIUM CHLORIDE 20 MEQ
1 PACKET (EA) ORAL
Qty: 0 | Refills: 0 | DISCHARGE
Start: 2023-02-05

## 2023-02-05 RX ORDER — FUROSEMIDE 40 MG
40 TABLET ORAL DAILY
Refills: 0 | Status: DISCONTINUED | OUTPATIENT
Start: 2023-02-05 | End: 2023-02-12

## 2023-02-05 RX ORDER — ASPIRIN/CALCIUM CARB/MAGNESIUM 324 MG
1 TABLET ORAL
Qty: 0 | Refills: 0 | DISCHARGE

## 2023-02-05 RX ADMIN — ZOLPIDEM TARTRATE 5 MILLIGRAM(S): 10 TABLET ORAL at 01:59

## 2023-02-05 RX ADMIN — PANTOPRAZOLE SODIUM 40 MILLIGRAM(S): 20 TABLET, DELAYED RELEASE ORAL at 06:10

## 2023-02-05 RX ADMIN — SODIUM CHLORIDE 3 MILLILITER(S): 9 INJECTION INTRAMUSCULAR; INTRAVENOUS; SUBCUTANEOUS at 06:01

## 2023-02-05 RX ADMIN — SODIUM CHLORIDE 3 MILLILITER(S): 9 INJECTION INTRAMUSCULAR; INTRAVENOUS; SUBCUTANEOUS at 12:18

## 2023-02-05 RX ADMIN — METHOCARBAMOL 750 MILLIGRAM(S): 500 TABLET, FILM COATED ORAL at 08:48

## 2023-02-05 RX ADMIN — TRAMADOL HYDROCHLORIDE 25 MILLIGRAM(S): 50 TABLET ORAL at 00:01

## 2023-02-05 RX ADMIN — Medication 40 MILLIGRAM(S): at 06:10

## 2023-02-05 RX ADMIN — SODIUM CHLORIDE 3 MILLILITER(S): 9 INJECTION INTRAMUSCULAR; INTRAVENOUS; SUBCUTANEOUS at 00:01

## 2023-02-05 NOTE — ED ADULT NURSE REASSESSMENT NOTE - NS ED NURSE REASSESS COMMENT FT1
pt received at 0530 from ED RN, pt VSS, AOx4, resting in stretcher comfortably in NAD. Plan of care discussed with patient and patient daughter. Pt oriented to room and call bell system, call bell within pt reach. Will con't with pt plan of care.
Assumed care of the patient @0730. Pt A&Ox3, VSS afebrile. Pt c/o generalized weakness. Pt repositioned for comfort. Pt resting comfortably at this time. Safety maintained.  Patient in understanding of plan of care. Patient with no further questions for the RN. Resting in comfort. Call bell within reach and encouraged to use when assistance needed. Will continue to monitor.

## 2023-02-05 NOTE — ED CDU PROVIDER INITIAL DAY NOTE - NS ED ROS FT
Gen: +Fatigue. denies fever, chills  Skin: denies rashes  HEENT: denies visual changes, ear pain, nasal congestion, throat pain  Respiratory: denies BOO, SOB, cough, wheezing  Cardiovascular: denies chest pain, palpitations, diaphoresis, LE edema  GI: denies abdominal pain, n/v/d  : denies dysuria, frequency, hematuria  MSK: denies joint swelling/pain, back pain, neck pain  Neuro: +generalized weakness. denies headache, dizziness, LOC, numbness

## 2023-02-05 NOTE — PROVIDER CONTACT NOTE (OTHER) - ASSESSMENT
Short term goals (2-3 visits): bed mobility: min assist x1, transfers (sit to stand): min assist x1 with RW, gait" amb 50 ft with RW min assist x 1

## 2023-02-05 NOTE — ED CDU PROVIDER INITIAL DAY NOTE - NSICDXPASTSURGICALHX_GEN_ALL_CORE_FT
PAST SURGICAL HISTORY:  H/O total knee replacement, left     History of esophageal surgery     Pacemaker [place] [employer]    S/P cataract surgery     S/P colectomy 1992    S/P TURP

## 2023-02-05 NOTE — ED CDU PROVIDER INITIAL DAY NOTE - OBJECTIVE STATEMENT
89 yo male with pmhx of metastatic prostate CA to the bone, spinal stenosis, anemia, bradycardia hx of pacemaker, HTN presents with progressive generalized weakness and loss of appetite for approx 1 mo. Daughter at the bedside states that over the last day the generalized weakness in all extremities has gotten worse. States that this morning, pt was trying to get into bed, however, felt too fatigued and weak to lift himself into the bed.  Daughter also notes that the pt fell yesterday because legs gave out from underneath and that they felt too weak to stand up. Pt walks with a walker at baseline. Daughter states that the pt has not been eating as well as he normally does, experiencing a loss of appetite worsening over the last few weeks. Daughter at bedside states that the pt had 5 rounds of radiation that ended last Friday. Denies fever, chills, body aches, dizziness, LOC, vision changes, CP, palpitations, SOB, abd pain, N/V/C/D, dysuria, hematuria, paresthesias in the extremities, rashes. Pt known anemic, H/H here 8.0/26.3, states that this is pt's nl. Denies melena, hematochezia, changes in stool, hematemesis, hematuria. CT head negative for acute pathology. Pt placed into observation for PT evaluation.

## 2023-02-05 NOTE — ED ADULT NURSE REASSESSMENT NOTE - COMFORT CARE
plan of care explained/repositioned/side rails up/warm blanket provided
meal provided/plan of care explained/po fluids offered/repositioned/side rails up/wait time explained/warm blanket provided

## 2023-02-05 NOTE — CHART NOTE - NSCHARTNOTEFT_GEN_A_CORE
ABHIJEET NOTE: [facility] able to accept, fam and pt accept bed. NEAF uploaded and [facility] notified through ACM. SAMMY, COVID and SCREEN up to date. SW informed pt and left voicemail for daughter, Bharti informing of d/c. Treatment team notified. Transport ltr provided. Transport packet completed and provided to Nurse for d/c to ambulance.
CCC spoke with patient about LUIS, patient agreeable but wanted CCC to speak with his daughter Bharti  and wife Lluiva.  CCC met with Bharti at patients bedside, Bharti reviewed LUIS list with patient and Lluvia, 1st choice [facility], 2nd ... [facility], 3rd ... [facility], 4th ... [facility], FAMILY DOES NOT WANT [facility].  Any questions, please follow with Bharti at [phone].  SAMMY completed, SW aware.
SW NOTE: Plan is for [facility]IS, SAMMY completed by [facility] and circulated to choices in order: [facility], [facility], and [facility]. SW to continue following for d/c planning to [facility].

## 2023-02-05 NOTE — ED CDU PROVIDER DISPOSITION NOTE - ATTENDING CONTRIBUTION TO CARE
89yo M with pmhx of metastatic prostate CA to the bone, spinal stenosis, anemia, bradycardia s/p pacemaker, HTN presented with progressive generalized weakness and loss of appetite for approx 1 mo.  no acute pathology found. will place in [facility] for rehab.

## 2023-02-05 NOTE — ED CDU PROVIDER INITIAL DAY NOTE - PHYSICAL EXAMINATION
Gen: No acute distress, non toxic  HEENT: Mucous membranes moist, pink conjunctivae, EOMI. PERRL. Airway patent.   CV: RRR, nl s1/s2. No JVD.   Resp: CTAB, normal rate and effort. No wheezes, rhonchi or crackles.   GI: Abdomen soft, NT, ND. No rebound, no guarding  Neuro: A&O x4, MAEx4. 5/5 str ext x 4. Sensation intact, symmetric throughout. No FND's. CN 2-12 intact.   MSK: No spine or joint ttp. FROM UE and LE b/l.   Skin: No rashes. intact and perfused. cap refill <2sec  Vascular: Radial and dorsalis pedal pulses 2+ b/l. No LE edema.

## 2023-02-05 NOTE — ED CDU PROVIDER INITIAL DAY NOTE - CLINICAL SUMMARY MEDICAL DECISION MAKING FREE TEXT BOX
89 yo male with pmhx of metastatic prostate CA to the bone, spinal stenosis, anemia, bradycardia hx of pacemaker, HTN presents with progressive generalized weakness and loss of appetite for approx 1 mo. CT head negative, no electrolyte abnl, pt at baseline H/H as per pt and daughter. Pt placed into observation for PT eval.

## 2023-02-05 NOTE — PROVIDER CONTACT NOTE (OTHER) - SITUATION
Chart reviewed, contents noted. MD order for PT toney received. Initial evaluation completed - see chart for details. Pt. returned to [facility] and left in NAD with all needs in reach.

## 2023-02-05 NOTE — PHYSICAL THERAPY INITIAL EVALUATION ADULT - ADDITIONAL COMMENTS
Pt. reports he lives in a house with his wife with 6 NIK with rail and a flight up to the second story that he needs to negotiate. Pt. reports aide 2x/week to help with wife and himself but unsure about hours. Wife unable to assist. Pt. reports being modified independent PTA with a [employer]. No other DME. Pt. reports progressive weakness recently

## 2023-02-05 NOTE — ED CDU PROVIDER DISPOSITION NOTE - CLINICAL COURSE
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 Daughter states that over the last day the generalized weakness in all extremities has gotten worse. Yesterday AM pt was trying to get into bed, however, felt too fatigued and weak to lift himself into the bed. Pt known anemic, H/H here 8.0/26.3, downtrended on repeat to 7.4 after 1L NS. No melena, hematochezia, changes in stool, hematemesis, hematuria. CT head negative for acute pathology. UA negative. Flu A/B/RSV/Covid swab negative. Pt held in obs pending PT eval. PT recommending LUIS placement. SW obtained bed offer from [facility]. provided copy of all results, return precautions discussed.

## 2023-02-05 NOTE — PHYSICAL THERAPY INITIAL EVALUATION ADULT - PERTINENT HX OF CURRENT PROBLEM, REHAB EVAL
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 Daughter at the bedside states that over the last day the generalized weakness in all extremities has gotten worse. States that this morning, pt was trying to get into bed, however, felt too fatigued and weak to lift himself into the bed.  Daughter also notes that the pt fell yesterday because legs gave out from underneath and that they felt too weak to stand up. Pt walks with a walker at baseline. Daughter states that the pt has not been eating as well as he normally does, experiencing a loss of appetite worsening over the last few weeks. Daughter at bedside states that the pt had 5 rounds of radiation that ended last Friday. Denies fever, chills, body aches, dizziness, LOC, vision changes, CP, palpitations, SOB, abd pain, N/V/C/D, dysuria, hematuria, paresthesias in the extremities, rashes. Pt known anemic, H/H here 8.0/26.3, states that this is pt's nl. Denies melena, hematochezia, changes in stool, hematemesis, hematuria. CT head negative for acute pathology.

## 2023-02-05 NOTE — PROVIDER CONTACT NOTE (OTHER) - BACKGROUND
Discussed with PA and CCC. PT will follow. Pre and post session pain 0/10.  D/C recommendation: LUIS

## 2023-02-05 NOTE — ED ADULT NURSE REASSESSMENT NOTE - NURSING NEURO LEVEL OF CONSCIOUSNESS
alert and awake/follows commands yes (notify psychiatrist) yes (notify psychiatrist) yes (notify psychiatrist) yes (notify psychiatrist) yes (notify psychiatrist) yes (notify psychiatrist) yes (notify psychiatrist) yes (notify psychiatrist)

## 2023-02-05 NOTE — ED CDU PROVIDER DISPOSITION NOTE - PATIENT PORTAL LINK FT
You can access the FollowMyHealth Patient Portal offered by [facility] by registering at the following website: [email or web address]. By joining [employer]’s FollowMyHealth portal, you will also be able to view your health information using other applications (apps) compatible with our system.

## 2023-02-13 ENCOUNTER — EMERGENCY (EMERGENCY)
Facility: HOSPITAL | Age: 88
LOS: 1 days | Discharge: HOME | End: 2023-02-13
Attending: EMERGENCY MEDICINE
Payer: MEDICARE

## 2023-02-13 VITALS
DIASTOLIC BLOOD PRESSURE: 64 MMHG | RESPIRATION RATE: 18 BRPM | HEART RATE: 50 BPM | SYSTOLIC BLOOD PRESSURE: 143 MMHG | OXYGEN SATURATION: 97 % | TEMPERATURE: 100 F

## 2023-02-13 DIAGNOSIS — Z90.79 ACQUIRED ABSENCE OF OTHER GENITAL ORGAN(S): Chronic | ICD-10-CM

## 2023-02-13 DIAGNOSIS — Z96.652 PRESENCE OF LEFT ARTIFICIAL KNEE JOINT: Chronic | ICD-10-CM

## 2023-02-13 DIAGNOSIS — Z98.890 OTHER SPECIFIED POSTPROCEDURAL STATES: Chronic | ICD-10-CM

## 2023-02-13 DIAGNOSIS — Z95.0 PRESENCE OF CARDIAC PACEMAKER: Chronic | ICD-10-CM

## 2023-02-13 DIAGNOSIS — Z90.49 ACQUIRED ABSENCE OF OTHER SPECIFIED PARTS OF DIGESTIVE TRACT: Chronic | ICD-10-CM

## 2023-02-13 DIAGNOSIS — Z98.49 CATARACT EXTRACTION STATUS, UNSPECIFIED EYE: Chronic | ICD-10-CM

## 2023-02-13 LAB
ALBUMIN SERPL ELPH-MCNC: 3 G/DL — LOW (ref 3.3–5.2)
ALP SERPL-CCNC: 261 U/L — HIGH (ref 40–120)
ALT FLD-CCNC: 20 U/L — SIGNIFICANT CHANGE UP
ANION GAP SERPL CALC-SCNC: 15 MMOL/L — SIGNIFICANT CHANGE UP (ref 5–17)
ANISOCYTOSIS BLD QL: SLIGHT — SIGNIFICANT CHANGE UP
APPEARANCE UR: CLEAR — SIGNIFICANT CHANGE UP
APTT BLD: 26.8 SEC — LOW (ref 27.5–35.5)
AST SERPL-CCNC: 41 U/L — HIGH
BASOPHILS # BLD AUTO: 0 K/UL — SIGNIFICANT CHANGE UP (ref 0–0.2)
BASOPHILS NFR BLD AUTO: 0 % — SIGNIFICANT CHANGE UP (ref 0–2)
BILIRUB SERPL-MCNC: 0.3 MG/DL — LOW (ref 0.4–2)
BILIRUB UR-MCNC: NEGATIVE — SIGNIFICANT CHANGE UP
BLD GP AB SCN SERPL QL: SIGNIFICANT CHANGE UP
BUN SERPL-MCNC: 34.3 MG/DL — HIGH (ref 8–20)
CALCIUM SERPL-MCNC: 8.1 MG/DL — LOW (ref 8.4–10.5)
CHLORIDE SERPL-SCNC: 103 MMOL/L — SIGNIFICANT CHANGE UP (ref 96–108)
CO2 SERPL-SCNC: 19 MMOL/L — LOW (ref 22–29)
COLOR SPEC: YELLOW — SIGNIFICANT CHANGE UP
CREAT SERPL-MCNC: 1.05 MG/DL — SIGNIFICANT CHANGE UP (ref 0.5–1.3)
DIFF PNL FLD: NEGATIVE — SIGNIFICANT CHANGE UP
EGFR: 67 ML/MIN/1.73M2 — SIGNIFICANT CHANGE UP
ELLIPTOCYTES BLD QL SMEAR: SLIGHT — SIGNIFICANT CHANGE UP
EOSINOPHIL # BLD AUTO: 0 K/UL — SIGNIFICANT CHANGE UP (ref 0–0.5)
EOSINOPHIL NFR BLD AUTO: 0 % — SIGNIFICANT CHANGE UP (ref 0–6)
EPI CELLS # UR: SIGNIFICANT CHANGE UP
GIANT PLATELETS BLD QL SMEAR: PRESENT — SIGNIFICANT CHANGE UP
GLUCOSE SERPL-MCNC: 144 MG/DL — HIGH (ref 70–99)
GLUCOSE UR QL: NEGATIVE MG/DL — SIGNIFICANT CHANGE UP
HCT VFR BLD CALC: 21.6 % — LOW (ref 39–50)
HGB BLD-MCNC: 6.7 G/DL — CRITICAL LOW (ref 13–17)
INR BLD: 1.46 RATIO — HIGH (ref 0.88–1.16)
KETONES UR-MCNC: NEGATIVE — SIGNIFICANT CHANGE UP
LACTATE BLDV-MCNC: 1.6 MMOL/L — SIGNIFICANT CHANGE UP (ref 0.5–2)
LEUKOCYTE ESTERASE UR-ACNC: NEGATIVE — SIGNIFICANT CHANGE UP
LYMPHOCYTES # BLD AUTO: 0.34 K/UL — LOW (ref 1–3.3)
LYMPHOCYTES # BLD AUTO: 6.4 % — LOW (ref 13–44)
MACROCYTES BLD QL: SLIGHT — SIGNIFICANT CHANGE UP
MANUAL SMEAR VERIFICATION: SIGNIFICANT CHANGE UP
MCHC RBC-ENTMCNC: 28 PG — SIGNIFICANT CHANGE UP (ref 27–34)
MCHC RBC-ENTMCNC: 31 GM/DL — LOW (ref 32–36)
MCV RBC AUTO: 90.4 FL — SIGNIFICANT CHANGE UP (ref 80–100)
METAMYELOCYTES # FLD: 1.3 % — HIGH (ref 0–0)
MONOCYTES # BLD AUTO: 0.75 K/UL — SIGNIFICANT CHANGE UP (ref 0–0.9)
MONOCYTES NFR BLD AUTO: 14.1 % — HIGH (ref 2–14)
NEUTROPHILS # BLD AUTO: 4.17 K/UL — SIGNIFICANT CHANGE UP (ref 1.8–7.4)
NEUTROPHILS NFR BLD AUTO: 75.6 % — SIGNIFICANT CHANGE UP (ref 43–77)
NEUTS BAND # BLD: 2.6 % — SIGNIFICANT CHANGE UP (ref 0–8)
NITRITE UR-MCNC: NEGATIVE — SIGNIFICANT CHANGE UP
OB PNL STL: NEGATIVE — SIGNIFICANT CHANGE UP
OVALOCYTES BLD QL SMEAR: SLIGHT — SIGNIFICANT CHANGE UP
PH UR: 6 — SIGNIFICANT CHANGE UP (ref 5–8)
PLAT MORPH BLD: NORMAL — SIGNIFICANT CHANGE UP
PLATELET # BLD AUTO: 176 K/UL — SIGNIFICANT CHANGE UP (ref 150–400)
POIKILOCYTOSIS BLD QL AUTO: SLIGHT — SIGNIFICANT CHANGE UP
POLYCHROMASIA BLD QL SMEAR: SLIGHT — SIGNIFICANT CHANGE UP
POTASSIUM SERPL-MCNC: 5.2 MMOL/L — SIGNIFICANT CHANGE UP (ref 3.5–5.3)
POTASSIUM SERPL-SCNC: 5.2 MMOL/L — SIGNIFICANT CHANGE UP (ref 3.5–5.3)
PROT SERPL-MCNC: 7.3 G/DL — SIGNIFICANT CHANGE UP (ref 6.6–8.7)
PROT UR-MCNC: 15
PROTHROM AB SERPL-ACNC: 17 SEC — HIGH (ref 10.5–13.4)
RBC # BLD: 2.39 M/UL — LOW (ref 4.2–5.8)
RBC # FLD: 14.9 % — HIGH (ref 10.3–14.5)
RBC BLD AUTO: ABNORMAL
RBC CASTS # UR COMP ASSIST: NEGATIVE /HPF — SIGNIFICANT CHANGE UP (ref 0–4)
SODIUM SERPL-SCNC: 136 MMOL/L — SIGNIFICANT CHANGE UP (ref 135–145)
SP GR SPEC: 1.01 — SIGNIFICANT CHANGE UP (ref 1.01–1.02)
UROBILINOGEN FLD QL: NEGATIVE MG/DL — SIGNIFICANT CHANGE UP
WBC # BLD: 5.33 K/UL — SIGNIFICANT CHANGE UP (ref 3.8–10.5)
WBC # FLD AUTO: 5.33 K/UL — SIGNIFICANT CHANGE UP (ref 3.8–10.5)
WBC UR QL: NEGATIVE /HPF — SIGNIFICANT CHANGE UP (ref 0–5)

## 2023-02-13 PROCEDURE — 99285 EMERGENCY DEPT VISIT HI MDM: CPT

## 2023-02-13 PROCEDURE — 71045 X-RAY EXAM CHEST 1 VIEW: CPT | Mod: 26

## 2023-02-13 RX ORDER — ACETAMINOPHEN 500 MG
1000 TABLET ORAL ONCE
Refills: 0 | Status: COMPLETED | OUTPATIENT
Start: 2023-02-13 | End: 2023-02-13

## 2023-02-13 RX ORDER — TRAMADOL HYDROCHLORIDE 50 MG/1
50 TABLET ORAL ONCE
Refills: 0 | Status: DISCONTINUED | OUTPATIENT
Start: 2023-02-13 | End: 2023-02-13

## 2023-02-13 RX ADMIN — TRAMADOL HYDROCHLORIDE 50 MILLIGRAM(S): 50 TABLET ORAL at 21:39

## 2023-02-13 RX ADMIN — Medication 400 MILLIGRAM(S): at 20:34

## 2023-02-13 NOTE — ED PROVIDER NOTE - OBJECTIVE STATEMENT
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 Patient reports decreased appetite for 2 days, unaware he had a fever. Per facility, T101F. Denies CP, n/v, diarrhea, abdominal pain. Denies sick contacts. Smoked for 10 years, quit in 1960s.

## 2023-02-13 NOTE — ED PROVIDER NOTE - PHYSICAL EXAMINATION
General: well appearing, NAD  Head: NC/AT  Cardiac: RRR, no m/r/g, 1+ pitting edema b/l LEs  Respiratory: CTABL, equal chest wall expansions, no conversational dyspnea  Abdomen: soft, ND, NT  Neuro: Alert, coordinated movement of all 4 extremities  Psych: calm, cooperative, normal affect  Skin: warm and dry

## 2023-02-13 NOTE — ED ADULT NURSE NOTE - CHIEF COMPLAINT QUOTE
PT JUAN DAVID from [facility] for fever 101. Given tylenol at 1700. + cough + decreased appetite.

## 2023-02-13 NOTE — ED PROVIDER NOTE - ATTENDING CONTRIBUTION TO CARE
pt with fever, cough, decreased appetite and generalized weakness from [facility].  Pt was sent there 1 wk ago after a fall and is there for LUIS.    physical - rrr. ctab. abd - soft, nt. no edema. no rash.    plan - labs reviewed. pt found to have a drop in hgb. guaiac sent. Pt with fever from [facility].  Pt signed out to dr. bermudez pending UA, guaiac and admission.  1 unit PRBC ordered for patient.

## 2023-02-13 NOTE — ED ADULT NURSE NOTE - OBJECTIVE STATEMENT
Pt presents to ED from [facility] facility c/o fever. As per family at bedside, pt has had a decreased appetite, fever and a cough. PMH metastatic prostate cancer. Pt denies chest pain, SOB, n/v/d, or urinary symptoms. MD at bedside for further evaluation.

## 2023-02-13 NOTE — ED PROVIDER NOTE - NS ED ROS FT
Constitutional: +fever, no chills.  CV: no chest pain, no edema.  Resp: +cough, no dyspnea  GI: no abdominal pain, no nausea and no vomiting.  Neuro: no headache, no dizziness  ROS otherwise negative except as noted in HPI.

## 2023-02-13 NOTE — ED ADULT NURSE NOTE - NSICDXPASTSURGICALHX_GEN_ALL_CORE_FT
PAST SURGICAL HISTORY:  H/O total knee replacement, left     History of esophageal surgery     Pacemaker [place] [employer]    S/P cataract surgery     S/P colectomy 1992    S/P TURP

## 2023-02-13 NOTE — ED PROVIDER NOTE - PROGRESS NOTE DETAILS
Maurilio: Spoke to Logan, Nursing Supervisor at [facility]. Discussed anemia, that we had given 1 U PRBC, but otherwise negative workup. Pt continues to be well appearing. Happy with return of pt to facility. Will establish care. Given age and predisposing conditions with give oral antibiotics for outpatient.

## 2023-02-13 NOTE — ED PROVIDER NOTE - NSFOLLOWUPINSTRUCTIONS_ED_ALL_ED_FT
Take 100 mg Doxycycline twice a day for 7 days.    Treating empirically for possible pneumonia. However, chest xray was clear of infiltrate and WBCs were 5. Maintaining 98% on room air.    Follow up with your primary care provider.      Community-Acquired Pneumonia, Adult      Pneumonia is a lung infection that causes inflammation and the buildup of mucus and fluids in the lungs. This may cause coughing and difficulty breathing. Community-acquired pneumonia is pneumonia that develops in people who are not, and have not recently been, in a hospital or other health care facility.    Usually, pneumonia develops as a result of an illness that is caused by a virus, such as the common cold and the flu (influenza). It can also be caused by bacteria or fungi. While the common cold and influenza can pass from person to person (are contagious), pneumonia itself is not considered contagious.      What are the causes?     This condition may be caused by:  •Viruses.      •Bacteria.      •Fungi, such as molds or mushrooms.        What increases the risk?    The following factors may make you more likely to develop this condition:•Having certain medical conditions, such as:  •A long-term (chronic) disease, which may include chronic obstructive pulmonary disease (COPD), asthma, heart failure, cystic fibrosis, diabetes, kidney disease, sickle cell disease, and human immunodeficiency virus (HIV).      •A condition that increases the risk of breathing in (aspirating) mucus and other fluids from your mouth and nose.      •A weakened body defense system (immune system).        •Having had your spleen removed (splenectomy). The spleen is the organ that helps fight germs and infections.      •Not cleaning your teeth and gums well (poor dental hygiene).      •Using tobacco products.      •Traveling to places where germs that cause pneumonia are present.      •Being near certain animals, or animal habitats, that have germs that cause pneumonia.      •Being older than 65 years of age.        What are the signs or symptoms?    Symptoms of this condition include:  •A dry cough or a wet (productive) cough.      •A fever.      •Sweating or chills.      •Chest pain, especially when breathing deeply or coughing.      •Fast breathing, difficulty breathing, or shortness of breath.      •Tiredness (fatigue).      •Muscle aches.        How is this diagnosed?     This condition may be diagnosed based on your medical history or a physical exam. You may also have tests, including:  •Chest X-rays.      •Tests of the level of oxygen and other gases in your blood.    •Tests of:  •Your blood.      •Mucus from your lungs (sputum).      •Fluid around your lungs (pleural fluid).      •Your urine.        If your pneumonia is severe, other tests may be done to learn more about the cause.      How is this treated?    Treatment for this condition depends on many factors, such as the cause of your pneumonia, your medicines, and other medical conditions that you have.    For most adults, pneumonia may be treated at home. In some cases, treatment must happen in a hospital and may include:•Medicines that are given by mouth (orally) or through an IV, including:  •Antibiotic medicines, if bacteria caused the pneumonia.      •Medicines that kill viruses (antiviral medicines), if a virus caused the pneumonia.        •Oxygen therapy.      Severe pneumonia, although rare, may require the following treatments:  •Mechanical ventilation.This procedure uses a machine to help you breathe if you cannot breathe well on your own or maintain a safe level of blood oxygen.      •Thoracentesis. This procedure removes any buildup of pleural fluid to help with breathing.        Follow these instructions at home:     Medicines     •Take over-the-counter and prescription medicines only as told by your health care provider.      •Take cough medicine only if you have trouble sleeping. Cough medicine can prevent your body from removing mucus from your lungs.      •If you were prescribed an antibiotic medicine, take it as told by your health care provider. Do not stop taking the antibiotic even if you start to feel better.        Lifestyle                   • Do not drink alcohol.      • Do not use any products that contain nicotine or tobacco, such as cigarettes, e-cigarettes, and chewing tobacco. If you need help quitting, ask your health care provider.      •Eat a healthy diet. This includes plenty of vegetables, fruits, whole grains, low-fat dairy products, and lean protein.      General instructions     •Rest a lot and get at least 8 hours of sleep each night.      •Sleep in a partly upright position at night. Place a few pillows under your head or sleep in a reclining chair.      •Return to your normal activities as told by your health care provider. Ask your health care provider what activities are safe for you.      •Drink enough fluid to keep your urine pale yellow. This helps to thin the mucus in your lungs.      •If your throat is sore, gargle with a salt–water mixture 3–4 times a day or as needed. To make a salt–water mixture, completely dissolve ½–1 tsp (3–6 g) of salt in 1 cup (237 mL) of warm water.      •Keep all follow-up visits as told by your health care provider. This is important.        How is this prevented?    You can lower your risk of developing community-acquired pneumonia by:•Getting the pneumonia vaccine. There are different types and schedules of pneumonia vaccines. Ask your health care provider which option is best for you. Consider getting the pneumonia vaccine if:  •You are older than 65 years of age.      •You are 19–65 years of age and are receiving cancer treatment, have chronic lung disease, or have other medical conditions that affect your immune system. Ask your health care provider if this applies to you.        •Getting your influenza vaccine every year. Ask your health care provider which type of vaccine is best for you.      •Getting regular dental checkups.      •Washing your hands often with soap and water for at least 20 seconds. If soap and water are not available, use hand .        Contact a health care provider if you have:    •A fever.      •Trouble sleeping because you cannot control your cough with cough medicine.        Get help right away if:    •Your shortness of breath becomes worse.      •Your chest pain increases.      •Your sickness becomes worse, especially if you are an older adult or have a weak immune system.      •You cough up blood.      These symptoms may represent a serious problem that is an emergency. Do not wait to see if the symptoms will go away. Get medical help right away. Call your local emergency services (911 in the [place] Do not drive yourself to the hospital.       Summary    •Pneumonia is an infection of the lungs.      •Community-acquired pneumonia develops in people who have not been in the hospital. It can be caused by bacteria, viruses, or fungi.      •This condition may be treated with antibiotics or antiviral medicines.      •Severe pneumonia may require a hospital stay and treatment to help with breathing.      This information is not intended to replace advice given to you by your health care provider. Make sure you discuss any questions you have with your health care provider.

## 2023-02-13 NOTE — ED PROVIDER NOTE - CLINICAL SUMMARY MEDICAL DECISION MAKING FREE TEXT BOX
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 T 101F at facility, 100.3F in ER. Sepsis workup initiated. Ofirmev given. Fluids held due to concern for possible fluid overload. Labs, CXR pending. 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 T 101F at facility, 100.3F in ER. Sepsis workup initiated. Ofirmev given. Fluids held due to concern for possible fluid overload. Labs, CXR pending.    Given recent cough will treat with doxy for pna but given cxr wo infiltrate, normal wbcs, and not desatting.

## 2023-02-13 NOTE — ED ADULT TRIAGE NOTE - CHIEF COMPLAINT QUOTE
PT JUAN DAVID from [facility] for fever 101. Given tylenol at 1700. + cough + decreased appetite.

## 2023-02-13 NOTE — ED PROVIDER NOTE - PATIENT PORTAL LINK FT
You can access the FollowMyHealth Patient Portal offered by [facility] by registering at the following website: [email or web address]. By joining [employer]’s FollowMyHealth portal, you will also be able to view your health information using other applications (apps) compatible with our system.

## 2023-02-13 NOTE — ED PROVIDER NOTE - NSICDXPASTSURGICALHX_GEN_ALL_CORE_FT
PAST SURGICAL HISTORY:  H/O total knee replacement, left     History of esophageal surgery     Pacemaker [place] [employer]    S/P cataract surgery     S/P colectomy 1992    S/P TURP

## 2023-02-13 NOTE — ED PROVIDER NOTE - HIV OFFER
Previously Declined (within the last year) Epidermal Autograft Text: The defect edges were debeveled with a #15 scalpel blade.  Given the location of the defect, shape of the defect and the proximity to free margins an epidermal autograft was deemed most appropriate.  Using a sterile surgical marker, the primary defect shape was transferred to the donor site. The epidermal graft was then harvested.  The skin graft was then placed in the primary defect and oriented appropriately.

## 2023-02-14 VITALS
OXYGEN SATURATION: 97 % | SYSTOLIC BLOOD PRESSURE: 127 MMHG | DIASTOLIC BLOOD PRESSURE: 65 MMHG | RESPIRATION RATE: 22 BRPM | TEMPERATURE: 99 F | HEART RATE: 53 BPM

## 2023-02-14 LAB
RAPID RVP RESULT: SIGNIFICANT CHANGE UP
SARS-COV-2 RNA SPEC QL NAA+PROBE: SIGNIFICANT CHANGE UP

## 2023-02-14 PROCEDURE — 80053 COMPREHEN METABOLIC PANEL: CPT

## 2023-02-14 PROCEDURE — 96375 TX/PRO/DX INJ NEW DRUG ADDON: CPT

## 2023-02-14 PROCEDURE — 85730 THROMBOPLASTIN TIME PARTIAL: CPT

## 2023-02-14 PROCEDURE — 86900 BLOOD TYPING SEROLOGIC ABO: CPT

## 2023-02-14 PROCEDURE — 83605 ASSAY OF LACTIC ACID: CPT

## 2023-02-14 PROCEDURE — 71045 X-RAY EXAM CHEST 1 VIEW: CPT

## 2023-02-14 PROCEDURE — 96374 THER/PROPH/DIAG INJ IV PUSH: CPT

## 2023-02-14 PROCEDURE — 86923 COMPATIBILITY TEST ELECTRIC: CPT

## 2023-02-14 PROCEDURE — 81001 URINALYSIS AUTO W/SCOPE: CPT

## 2023-02-14 PROCEDURE — 85610 PROTHROMBIN TIME: CPT

## 2023-02-14 PROCEDURE — 85025 COMPLETE CBC W/AUTO DIFF WBC: CPT

## 2023-02-14 PROCEDURE — 82272 OCCULT BLD FECES 1-3 TESTS: CPT

## 2023-02-14 PROCEDURE — P9016: CPT

## 2023-02-14 PROCEDURE — 86901 BLOOD TYPING SEROLOGIC RH(D): CPT

## 2023-02-14 PROCEDURE — 86850 RBC ANTIBODY SCREEN: CPT

## 2023-02-14 PROCEDURE — 87086 URINE CULTURE/COLONY COUNT: CPT

## 2023-02-14 PROCEDURE — 36430 TRANSFUSION BLD/BLD COMPNT: CPT

## 2023-02-14 PROCEDURE — 36415 COLL VENOUS BLD VENIPUNCTURE: CPT

## 2023-02-14 PROCEDURE — 0225U NFCT DS DNA&RNA 21 SARSCOV2: CPT

## 2023-02-14 PROCEDURE — 87040 BLOOD CULTURE FOR BACTERIA: CPT

## 2023-02-14 PROCEDURE — 99284 EMERGENCY DEPT VISIT MOD MDM: CPT | Mod: 25

## 2023-02-14 RX ORDER — AZITHROMYCIN 500 MG/1
500 TABLET, FILM COATED ORAL ONCE
Refills: 0 | Status: COMPLETED | OUTPATIENT
Start: 2023-02-14 | End: 2023-02-14

## 2023-02-14 RX ORDER — CEFTRIAXONE 500 MG/1
1000 INJECTION, POWDER, FOR SOLUTION INTRAMUSCULAR; INTRAVENOUS ONCE
Refills: 0 | Status: COMPLETED | OUTPATIENT
Start: 2023-02-14 | End: 2023-02-14

## 2023-02-14 RX ORDER — CEFTRIAXONE 500 MG/1
1000 INJECTION, POWDER, FOR SOLUTION INTRAMUSCULAR; INTRAVENOUS ONCE
Refills: 0 | Status: DISCONTINUED | OUTPATIENT
Start: 2023-02-14 | End: 2023-02-14

## 2023-02-14 RX ADMIN — AZITHROMYCIN 255 MILLIGRAM(S): 500 TABLET, FILM COATED ORAL at 00:37

## 2023-02-14 RX ADMIN — CEFTRIAXONE 1000 MILLIGRAM(S): 500 INJECTION, POWDER, FOR SOLUTION INTRAMUSCULAR; INTRAVENOUS at 00:34

## 2023-02-14 NOTE — ED ADULT NURSE REASSESSMENT NOTE - NS ED NURSE REASSESS COMMENT FT1
Pt helped to bathroom and repositioned in bed at this time, vitals are as charted and pt offers no fruther complaints. Awaiting transport back to his facility. Reason for Delay in EMS unknown.

## 2023-02-15 LAB
CULTURE RESULTS: NO GROWTH — SIGNIFICANT CHANGE UP
SPECIMEN SOURCE: SIGNIFICANT CHANGE UP

## 2023-02-28 ENCOUNTER — EMERGENCY (EMERGENCY)
Facility: HOSPITAL | Age: 88
LOS: 1 days | Discharge: HOME | End: 2023-02-28
Attending: EMERGENCY MEDICINE
Payer: MEDICARE

## 2023-02-28 VITALS
OXYGEN SATURATION: 100 % | HEART RATE: 50 BPM | TEMPERATURE: 98 F | DIASTOLIC BLOOD PRESSURE: 68 MMHG | RESPIRATION RATE: 18 BRPM | SYSTOLIC BLOOD PRESSURE: 169 MMHG

## 2023-02-28 VITALS
OXYGEN SATURATION: 99 % | DIASTOLIC BLOOD PRESSURE: 68 MMHG | TEMPERATURE: 98 F | HEART RATE: 61 BPM | WEIGHT: 179.9 LBS | RESPIRATION RATE: 16 BRPM | SYSTOLIC BLOOD PRESSURE: 171 MMHG

## 2023-02-28 DIAGNOSIS — Z98.49 CATARACT EXTRACTION STATUS, UNSPECIFIED EYE: Chronic | ICD-10-CM

## 2023-02-28 DIAGNOSIS — Z95.0 PRESENCE OF CARDIAC PACEMAKER: Chronic | ICD-10-CM

## 2023-02-28 DIAGNOSIS — Z90.79 ACQUIRED ABSENCE OF OTHER GENITAL ORGAN(S): Chronic | ICD-10-CM

## 2023-02-28 DIAGNOSIS — Z98.890 OTHER SPECIFIED POSTPROCEDURAL STATES: Chronic | ICD-10-CM

## 2023-02-28 DIAGNOSIS — Z90.49 ACQUIRED ABSENCE OF OTHER SPECIFIED PARTS OF DIGESTIVE TRACT: Chronic | ICD-10-CM

## 2023-02-28 DIAGNOSIS — Z96.652 PRESENCE OF LEFT ARTIFICIAL KNEE JOINT: Chronic | ICD-10-CM

## 2023-02-28 LAB
ALBUMIN SERPL ELPH-MCNC: 2.5 G/DL — LOW (ref 3.3–5.2)
ALP SERPL-CCNC: 287 U/L — HIGH (ref 40–120)
ALT FLD-CCNC: 59 U/L — HIGH
ANION GAP SERPL CALC-SCNC: 15 MMOL/L — SIGNIFICANT CHANGE UP (ref 5–17)
AST SERPL-CCNC: 63 U/L — HIGH
BASOPHILS # BLD AUTO: 0 K/UL — SIGNIFICANT CHANGE UP (ref 0–0.2)
BASOPHILS NFR BLD AUTO: 0 % — SIGNIFICANT CHANGE UP (ref 0–2)
BILIRUB SERPL-MCNC: 0.2 MG/DL — LOW (ref 0.4–2)
BLD GP AB SCN SERPL QL: SIGNIFICANT CHANGE UP
BUN SERPL-MCNC: 31.9 MG/DL — HIGH (ref 8–20)
CALCIUM SERPL-MCNC: 7.9 MG/DL — LOW (ref 8.4–10.5)
CHLORIDE SERPL-SCNC: 104 MMOL/L — SIGNIFICANT CHANGE UP (ref 96–108)
CO2 SERPL-SCNC: 19 MMOL/L — LOW (ref 22–29)
CREAT SERPL-MCNC: 0.84 MG/DL — SIGNIFICANT CHANGE UP (ref 0.5–1.3)
EGFR: 82 ML/MIN/1.73M2 — SIGNIFICANT CHANGE UP
EOSINOPHIL # BLD AUTO: 0.16 K/UL — SIGNIFICANT CHANGE UP (ref 0–0.5)
EOSINOPHIL NFR BLD AUTO: 3.5 % — SIGNIFICANT CHANGE UP (ref 0–6)
GIANT PLATELETS BLD QL SMEAR: PRESENT — SIGNIFICANT CHANGE UP
GLUCOSE SERPL-MCNC: 128 MG/DL — HIGH (ref 70–99)
HCT VFR BLD CALC: 20.5 % — CRITICAL LOW (ref 39–50)
HGB BLD-MCNC: 6.2 G/DL — CRITICAL LOW (ref 13–17)
LYMPHOCYTES # BLD AUTO: 0.56 K/UL — LOW (ref 1–3.3)
LYMPHOCYTES # BLD AUTO: 12.2 % — LOW (ref 13–44)
MANUAL SMEAR VERIFICATION: SIGNIFICANT CHANGE UP
MCHC RBC-ENTMCNC: 27.6 PG — SIGNIFICANT CHANGE UP (ref 27–34)
MCHC RBC-ENTMCNC: 30.2 GM/DL — LOW (ref 32–36)
MCV RBC AUTO: 91.1 FL — SIGNIFICANT CHANGE UP (ref 80–100)
METAMYELOCYTES # FLD: 3.5 % — HIGH (ref 0–0)
MONOCYTES # BLD AUTO: 0.28 K/UL — SIGNIFICANT CHANGE UP (ref 0–0.9)
MONOCYTES NFR BLD AUTO: 6.1 % — SIGNIFICANT CHANGE UP (ref 2–14)
MYELOCYTES NFR BLD: 0.9 % — HIGH (ref 0–0)
NEUTROPHILS # BLD AUTO: 3.33 K/UL — SIGNIFICANT CHANGE UP (ref 1.8–7.4)
NEUTROPHILS NFR BLD AUTO: 65.2 % — SIGNIFICANT CHANGE UP (ref 43–77)
NEUTS BAND # BLD: 7.8 % — SIGNIFICANT CHANGE UP (ref 0–8)
NRBC # BLD: 1 /100 — HIGH (ref 0–0)
OB PNL STL: NEGATIVE — SIGNIFICANT CHANGE UP
OVALOCYTES BLD QL SMEAR: SLIGHT — SIGNIFICANT CHANGE UP
PLAT MORPH BLD: NORMAL — SIGNIFICANT CHANGE UP
PLATELET # BLD AUTO: 151 K/UL — SIGNIFICANT CHANGE UP (ref 150–400)
POIKILOCYTOSIS BLD QL AUTO: SLIGHT — SIGNIFICANT CHANGE UP
POLYCHROMASIA BLD QL SMEAR: SIGNIFICANT CHANGE UP
POTASSIUM SERPL-MCNC: 4.8 MMOL/L — SIGNIFICANT CHANGE UP (ref 3.5–5.3)
POTASSIUM SERPL-SCNC: 4.8 MMOL/L — SIGNIFICANT CHANGE UP (ref 3.5–5.3)
PROMYELOCYTES # FLD: 0.8 % — HIGH (ref 0–0)
PROT SERPL-MCNC: 6.4 G/DL — LOW (ref 6.6–8.7)
RBC # BLD: 2.25 M/UL — LOW (ref 4.2–5.8)
RBC # FLD: 16.2 % — HIGH (ref 10.3–14.5)
RBC BLD AUTO: ABNORMAL
SARS-COV-2 RNA SPEC QL NAA+PROBE: SIGNIFICANT CHANGE UP
SMUDGE CELLS # BLD: PRESENT — SIGNIFICANT CHANGE UP
SODIUM SERPL-SCNC: 138 MMOL/L — SIGNIFICANT CHANGE UP (ref 135–145)
WBC # BLD: 4.56 K/UL — SIGNIFICANT CHANGE UP (ref 3.8–10.5)
WBC # FLD AUTO: 4.56 K/UL — SIGNIFICANT CHANGE UP (ref 3.8–10.5)

## 2023-02-28 PROCEDURE — U0005: CPT

## 2023-02-28 PROCEDURE — 99285 EMERGENCY DEPT VISIT HI MDM: CPT | Mod: 25

## 2023-02-28 PROCEDURE — 36430 TRANSFUSION BLD/BLD COMPNT: CPT

## 2023-02-28 PROCEDURE — 86901 BLOOD TYPING SEROLOGIC RH(D): CPT

## 2023-02-28 PROCEDURE — 36415 COLL VENOUS BLD VENIPUNCTURE: CPT

## 2023-02-28 PROCEDURE — 80053 COMPREHEN METABOLIC PANEL: CPT

## 2023-02-28 PROCEDURE — U0003: CPT

## 2023-02-28 PROCEDURE — 82272 OCCULT BLD FECES 1-3 TESTS: CPT

## 2023-02-28 PROCEDURE — 86850 RBC ANTIBODY SCREEN: CPT

## 2023-02-28 PROCEDURE — 99284 EMERGENCY DEPT VISIT MOD MDM: CPT | Mod: GC

## 2023-02-28 PROCEDURE — P9016: CPT

## 2023-02-28 PROCEDURE — 93010 ELECTROCARDIOGRAM REPORT: CPT

## 2023-02-28 PROCEDURE — 86900 BLOOD TYPING SEROLOGIC ABO: CPT

## 2023-02-28 PROCEDURE — 93005 ELECTROCARDIOGRAM TRACING: CPT

## 2023-02-28 PROCEDURE — 85025 COMPLETE CBC W/AUTO DIFF WBC: CPT

## 2023-02-28 PROCEDURE — 86923 COMPATIBILITY TEST ELECTRIC: CPT

## 2023-02-28 RX ORDER — LISINOPRIL 2.5 MG/1
40 TABLET ORAL ONCE
Refills: 0 | Status: COMPLETED | OUTPATIENT
Start: 2023-02-28 | End: 2023-02-28

## 2023-02-28 RX ORDER — AMLODIPINE BESYLATE 2.5 MG/1
5 TABLET ORAL ONCE
Refills: 0 | Status: COMPLETED | OUTPATIENT
Start: 2023-02-28 | End: 2023-02-28

## 2023-02-28 RX ADMIN — LISINOPRIL 40 MILLIGRAM(S): 2.5 TABLET ORAL at 09:37

## 2023-02-28 RX ADMIN — AMLODIPINE BESYLATE 5 MILLIGRAM(S): 2.5 TABLET ORAL at 09:37

## 2023-02-28 NOTE — ED PROVIDER NOTE - NSFOLLOWUPINSTRUCTIONS_ED_ALL_ED_FT
-Patient was given 1 unit of PRBC, lab results attached     SEEK IMMEDIATE MEDICAL CARE IF YOU HAVE ANY OF THE FOLLOWING SYMPTOMS: extreme weakness/chest pain/shortness of breath, black or bloody stools, vomiting blood, fainting, fever, or any signs of dehydration.

## 2023-02-28 NOTE — CHART NOTE - NSCHARTNOTEFT_GEN_A_CORE
(DELAYED ENTRY NOTE FROM THIS AM) ABHIJEET Note: ABHIJEET alerted pt is stable for d/c back to [facility] s/p transfusion 1 unit PRBC. ABHIJEET forwarded ED Clinicals to [facility] via [facility], pt accepted back to return for today, transport arranged via ABHIJEET clerical for ambulance pickup. ABHIJEET placed call to pt's house, spoke to pt's wife Lluvia to notify pt is going back, left [facility] and [facility] Billing letter w/ RN. RN reports he gave report to facility and that pt's dgtr is at facility awaiting pt's return. No further  services identified at this time

## 2023-02-28 NOTE — ED PROVIDER NOTE - PROGRESS NOTE DETAILS
Juan Alberto: received sign out at 07:00 with Dr. Bowers. I rechecked patient now. PRBC transfusion ongoing. Patient feels "okay." Water provided Robinson: pt signed out to me pending prbc and then d/c back to [facility]. pt without complaints. hgb 6.2 transfused without events. mild htn, given home meds. spoke to Yanna at [facility]. accepted. stable for d/c.

## 2023-02-28 NOTE — ED PROVIDER NOTE - OBJECTIVE STATEMENT
89 y m with pmh of metastatic prostate cancer to bones presenting for low H&H. Pt denies any complaints. Pt is unsure if he his on blood thinners. Denies black or bloody stool, cp, sob, n/v, ab pain, f/c, cough, congestion, lightheadedness. Pt only says that he was woken from bed at his facility to come to the hospital.

## 2023-02-28 NOTE — ED ADULT NURSE NOTE - NSIMPLEMENTINTERV_GEN_ALL_ED
Implemented All Fall with Harm Risk Interventions:  [place] to call system. Call bell, personal items and telephone within reach. Instruct patient to call for assistance. Room bathroom lighting operational. Non-slip footwear when patient is off stretcher. Physically safe environment: no spills, clutter or unnecessary equipment. Stretcher in lowest position, wheels locked, appropriate side rails in place. Provide visual cue, wrist band, yellow gown, etc. Monitor gait and stability. Monitor for mental status changes and reorient to person, place, and time. Review medications for side effects contributing to fall risk. Reinforce activity limits and safety measures with patient and family. Provide visual clues: red socks.

## 2023-02-28 NOTE — ED ADULT NURSE NOTE - OBJECTIVE STATEMENT
Pt received on stretcher A&Ox3, from nursing [facility] as per EMS, pt denies any pain, pt here for abnormal labs.

## 2023-02-28 NOTE — ED PROVIDER NOTE - PATIENT PORTAL LINK FT
You can access the FollowMyHealth Patient Portal offered by [facility] by registering at the following website: [email or web address]. By joining [employer]’s FollowMyHealth portal, you will also be able to view your health information using other applications (apps) compatible with our system.

## 2023-02-28 NOTE — ED PROVIDER NOTE - NS ED MD DISPO DISCHARGE CCDA

## 2023-02-28 NOTE — ED ADULT NURSE REASSESSMENT NOTE - NS ED NURSE REASSESS COMMENT FT1
This writer called [facility] and spoke with Nurse Parish to provide update on patient's receipt of blood transfusion, clinical condition, and administration of antihypertensives.

## 2023-02-28 NOTE — ED ADULT TRIAGE NOTE - CHIEF COMPLAINT QUOTE
BIBEMS from [facility] c/o low hemoglobin and hematocrit (unknown value, not on sending paperwork). Patient A&Ox2, denies any pain or discomfort at triage.

## 2023-02-28 NOTE — ED PROVIDER NOTE - CLINICAL SUMMARY MEDICAL DECISION MAKING FREE TEXT BOX
Pt presenting for low H&H in setting of metastatic prostate cancer. Will get labs, type and screen. No complaints at this time. Will reassess. Pt presenting for low H&H in setting of metastatic prostate cancer. Will get labs, type and screen. No complaints at this time. Will reassess.    received blood, no complaints to be transferred back [facility] accepting.

## 2023-02-28 NOTE — ED PROVIDER NOTE - ATTENDING CONTRIBUTION TO CARE
Acute on chronic anemia without any evidence of active bleeding, will transfuse 1u PRBC for hgb 6.2 and dc back to SNF.

## 2023-03-07 ENCOUNTER — EMERGENCY (EMERGENCY)
Facility: HOSPITAL | Age: 88
LOS: 1 days | Discharge: HOME | End: 2023-03-07
Attending: EMERGENCY MEDICINE
Payer: MEDICARE

## 2023-03-07 VITALS
OXYGEN SATURATION: 98 % | RESPIRATION RATE: 16 BRPM | SYSTOLIC BLOOD PRESSURE: 161 MMHG | HEART RATE: 50 BPM | WEIGHT: 207.01 LBS | DIASTOLIC BLOOD PRESSURE: 67 MMHG | TEMPERATURE: 98 F

## 2023-03-07 VITALS
TEMPERATURE: 98 F | DIASTOLIC BLOOD PRESSURE: 70 MMHG | OXYGEN SATURATION: 100 % | SYSTOLIC BLOOD PRESSURE: 170 MMHG | RESPIRATION RATE: 20 BRPM | HEART RATE: 50 BPM

## 2023-03-07 DIAGNOSIS — Z96.652 PRESENCE OF LEFT ARTIFICIAL KNEE JOINT: Chronic | ICD-10-CM

## 2023-03-07 DIAGNOSIS — Z90.79 ACQUIRED ABSENCE OF OTHER GENITAL ORGAN(S): Chronic | ICD-10-CM

## 2023-03-07 DIAGNOSIS — Z98.890 OTHER SPECIFIED POSTPROCEDURAL STATES: Chronic | ICD-10-CM

## 2023-03-07 DIAGNOSIS — Z98.49 CATARACT EXTRACTION STATUS, UNSPECIFIED EYE: Chronic | ICD-10-CM

## 2023-03-07 DIAGNOSIS — Z95.0 PRESENCE OF CARDIAC PACEMAKER: Chronic | ICD-10-CM

## 2023-03-07 DIAGNOSIS — Z90.49 ACQUIRED ABSENCE OF OTHER SPECIFIED PARTS OF DIGESTIVE TRACT: Chronic | ICD-10-CM

## 2023-03-07 LAB
ACANTHOCYTES BLD QL SMEAR: SLIGHT — SIGNIFICANT CHANGE UP
ALBUMIN SERPL ELPH-MCNC: 2.6 G/DL — LOW (ref 3.3–5.2)
ALP SERPL-CCNC: 364 U/L — HIGH (ref 40–120)
ALT FLD-CCNC: 30 U/L — SIGNIFICANT CHANGE UP
ANION GAP SERPL CALC-SCNC: 12 MMOL/L — SIGNIFICANT CHANGE UP (ref 5–17)
ANISOCYTOSIS BLD QL: SLIGHT — SIGNIFICANT CHANGE UP
APTT BLD: 29.2 SEC — SIGNIFICANT CHANGE UP (ref 27.5–35.5)
AST SERPL-CCNC: 45 U/L — HIGH
BASOPHILS # BLD AUTO: 0 K/UL — SIGNIFICANT CHANGE UP (ref 0–0.2)
BASOPHILS NFR BLD AUTO: 0 % — SIGNIFICANT CHANGE UP (ref 0–2)
BILIRUB SERPL-MCNC: 0.2 MG/DL — LOW (ref 0.4–2)
BLD GP AB SCN SERPL QL: SIGNIFICANT CHANGE UP
BUN SERPL-MCNC: 23.1 MG/DL — HIGH (ref 8–20)
BURR CELLS BLD QL SMEAR: PRESENT — SIGNIFICANT CHANGE UP
CALCIUM SERPL-MCNC: 7.7 MG/DL — LOW (ref 8.4–10.5)
CHLORIDE SERPL-SCNC: 102 MMOL/L — SIGNIFICANT CHANGE UP (ref 96–108)
CO2 SERPL-SCNC: 20 MMOL/L — LOW (ref 22–29)
CREAT SERPL-MCNC: 0.77 MG/DL — SIGNIFICANT CHANGE UP (ref 0.5–1.3)
DACRYOCYTES BLD QL SMEAR: SLIGHT — SIGNIFICANT CHANGE UP
EGFR: 85 ML/MIN/1.73M2 — SIGNIFICANT CHANGE UP
ELLIPTOCYTES BLD QL SMEAR: SLIGHT — SIGNIFICANT CHANGE UP
EOSINOPHIL # BLD AUTO: 0.03 K/UL — SIGNIFICANT CHANGE UP (ref 0–0.5)
EOSINOPHIL NFR BLD AUTO: 0.9 % — SIGNIFICANT CHANGE UP (ref 0–6)
FLUAV AG NPH QL: SIGNIFICANT CHANGE UP
FLUBV AG NPH QL: SIGNIFICANT CHANGE UP
GIANT PLATELETS BLD QL SMEAR: PRESENT — SIGNIFICANT CHANGE UP
GLUCOSE SERPL-MCNC: 136 MG/DL — HIGH (ref 70–99)
HCT VFR BLD CALC: 24.5 % — LOW (ref 39–50)
HGB BLD-MCNC: 7.6 G/DL — LOW (ref 13–17)
HYPOCHROMIA BLD QL: SLIGHT — SIGNIFICANT CHANGE UP
INR BLD: 1.3 RATIO — HIGH (ref 0.88–1.16)
LYMPHOCYTES # BLD AUTO: 0.41 K/UL — LOW (ref 1–3.3)
LYMPHOCYTES # BLD AUTO: 11.6 % — LOW (ref 13–44)
MACROCYTES BLD QL: SLIGHT — SIGNIFICANT CHANGE UP
MANUAL SMEAR VERIFICATION: SIGNIFICANT CHANGE UP
MCHC RBC-ENTMCNC: 28 PG — SIGNIFICANT CHANGE UP (ref 27–34)
MCHC RBC-ENTMCNC: 31 GM/DL — LOW (ref 32–36)
MCV RBC AUTO: 90.4 FL — SIGNIFICANT CHANGE UP (ref 80–100)
MICROCYTES BLD QL: SLIGHT — SIGNIFICANT CHANGE UP
MONOCYTES # BLD AUTO: 0.25 K/UL — SIGNIFICANT CHANGE UP (ref 0–0.9)
MONOCYTES NFR BLD AUTO: 7.1 % — SIGNIFICANT CHANGE UP (ref 2–14)
NEUTROPHILS # BLD AUTO: 2.82 K/UL — SIGNIFICANT CHANGE UP (ref 1.8–7.4)
NEUTROPHILS NFR BLD AUTO: 77.7 % — HIGH (ref 43–77)
NEUTS BAND # BLD: 1.8 % — SIGNIFICANT CHANGE UP (ref 0–8)
NRBC # BLD: 2 /100 — HIGH (ref 0–0)
OVALOCYTES BLD QL SMEAR: SLIGHT — SIGNIFICANT CHANGE UP
PLAT MORPH BLD: ABNORMAL
PLATELET # BLD AUTO: 129 K/UL — LOW (ref 150–400)
POIKILOCYTOSIS BLD QL AUTO: SLIGHT — SIGNIFICANT CHANGE UP
POLYCHROMASIA BLD QL SMEAR: SLIGHT — SIGNIFICANT CHANGE UP
POTASSIUM SERPL-MCNC: 4.6 MMOL/L — SIGNIFICANT CHANGE UP (ref 3.5–5.3)
POTASSIUM SERPL-SCNC: 4.6 MMOL/L — SIGNIFICANT CHANGE UP (ref 3.5–5.3)
PROT SERPL-MCNC: 6.2 G/DL — LOW (ref 6.6–8.7)
PROTHROM AB SERPL-ACNC: 15.1 SEC — HIGH (ref 10.5–13.4)
RBC # BLD: 2.71 M/UL — LOW (ref 4.2–5.8)
RBC # FLD: 15.9 % — HIGH (ref 10.3–14.5)
RBC BLD AUTO: ABNORMAL
RSV RNA NPH QL NAA+NON-PROBE: SIGNIFICANT CHANGE UP
SARS-COV-2 RNA SPEC QL NAA+PROBE: SIGNIFICANT CHANGE UP
SODIUM SERPL-SCNC: 134 MMOL/L — LOW (ref 135–145)
VARIANT LYMPHS # BLD: 0.9 % — SIGNIFICANT CHANGE UP (ref 0–6)
WBC # BLD: 3.55 K/UL — LOW (ref 3.8–10.5)
WBC # FLD AUTO: 3.55 K/UL — LOW (ref 3.8–10.5)

## 2023-03-07 PROCEDURE — 99285 EMERGENCY DEPT VISIT HI MDM: CPT

## 2023-03-07 PROCEDURE — 82728 ASSAY OF FERRITIN: CPT

## 2023-03-07 PROCEDURE — 85610 PROTHROMBIN TIME: CPT

## 2023-03-07 PROCEDURE — 83540 ASSAY OF IRON: CPT

## 2023-03-07 PROCEDURE — 87637 SARSCOV2&INF A&B&RSV AMP PRB: CPT

## 2023-03-07 PROCEDURE — 36415 COLL VENOUS BLD VENIPUNCTURE: CPT

## 2023-03-07 PROCEDURE — 99284 EMERGENCY DEPT VISIT MOD MDM: CPT

## 2023-03-07 PROCEDURE — 85025 COMPLETE CBC W/AUTO DIFF WBC: CPT

## 2023-03-07 PROCEDURE — 85045 AUTOMATED RETICULOCYTE COUNT: CPT

## 2023-03-07 PROCEDURE — 85730 THROMBOPLASTIN TIME PARTIAL: CPT

## 2023-03-07 PROCEDURE — 84466 ASSAY OF TRANSFERRIN: CPT

## 2023-03-07 PROCEDURE — 86850 RBC ANTIBODY SCREEN: CPT

## 2023-03-07 PROCEDURE — 80053 COMPREHEN METABOLIC PANEL: CPT

## 2023-03-07 PROCEDURE — 83615 LACTATE (LD) (LDH) ENZYME: CPT

## 2023-03-07 PROCEDURE — 82746 ASSAY OF FOLIC ACID SERUM: CPT

## 2023-03-07 PROCEDURE — 82607 VITAMIN B-12: CPT

## 2023-03-07 PROCEDURE — 86900 BLOOD TYPING SEROLOGIC ABO: CPT

## 2023-03-07 PROCEDURE — 83550 IRON BINDING TEST: CPT

## 2023-03-07 PROCEDURE — 86901 BLOOD TYPING SEROLOGIC RH(D): CPT

## 2023-03-07 RX ORDER — ACETAMINOPHEN 500 MG
975 TABLET ORAL ONCE
Refills: 0 | Status: COMPLETED | OUTPATIENT
Start: 2023-03-07 | End: 2023-03-07

## 2023-03-07 RX ADMIN — Medication 975 MILLIGRAM(S): at 12:21

## 2023-03-07 NOTE — ED PROVIDER NOTE - CLINICAL SUMMARY MEDICAL DECISION MAKING FREE TEXT BOX
89 yo male w anemia to 6.6 on outpatient labs. Hx of chronic anemia. Will check labs, transfuse if necessary. Monitor and reassess.

## 2023-03-07 NOTE — ED PROVIDER NOTE - PROGRESS NOTE DETAILS
Seun: hemoglobin 7.6. will hold off on transfusing. Patient will followup with Dr. Ferrara. Patient is stable for discharge at this time.

## 2023-03-07 NOTE — ED PROVIDER NOTE - NSFOLLOWUPCLINICS_GEN_ALL_ED_FT
[facility]  Hematology/Oncology  [address] [place] [address]  Phone: [phone]  Fax:   Follow Up Time: Urgent

## 2023-03-07 NOTE — ED PROVIDER NOTE - ATTENDING CONTRIBUTION TO CARE
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   denies cp/sob/palp. denies dizziness. sent in from pmd for drop in h/h.  has hx of frequent transfusions.  General:     NAD  Head:     NC/AT, EOMI, oral mucosa moist  Neck:     trachea midline  Lungs:     CTA b/l, no w/r/r  CVS:     S1S2, RRR, no m/g/r  Abd:     +BS, s/nt/nd, no organomegaly  Ext:    2+ radial and pedal pulses, no c/c/e  Neuro: AAOx3, no sensory/motor deficits  A/P:  89yoF with no compliaints, now with drop in H/H  -labs, ekg, cardiac monitor, t&s, re-eval

## 2023-03-07 NOTE — CONSULT NOTE ADULT - ASSESSMENT
89 Yo gentleman with multiple comorbidities, and history of prostate cancer diagnosed in 2016, now with metastatic prostate cancer with bone involvement with potential lung involvement on Axumin PET scan on ADT by urologist, received palliative radiation therapy 5 fractions to T8 lesions, sent for nursing home because of worsening anemia.  Last clinic visit with primary oncologist was in January 2023.  Plan was for radium 223 after RT but did not return for follow-up since January 2023.  Has diffuse pain.  Likely related to meds.    History of prostate cancer:  -  Patient to keep appointment with primary oncologist Dr. Ferrara at [facility] upon discharge  for possible radium 223 treatment  - patient to keep appointment with Urology upon discharge for continuation of ADT  - p.r.n. pain medication    Anemia and thrombocytopenia:  -  Could be related to prostate cancer involvement of bone marrow  - please send B12, folate, retic count, LDH, iron profile, ferritin, fibrinogen  - p.r.n. transfusion to keep hemoglobin more than 7    Will follow

## 2023-03-07 NOTE — ED ADULT NURSE NOTE - OBJECTIVE STATEMENT
Patient presents ot ED from [facility] for transfusion. Patient has no complaints at this time.

## 2023-03-07 NOTE — ED ADULT NURSE NOTE - NSICDXPASTSURGICALHX_GEN_ALL_CORE_FT
PAST SURGICAL HISTORY:  H/O total knee replacement, left     History of esophageal surgery     Pacemaker [place] [employer]    S/P cataract surgery     S/P colectomy 1992    S/P TURP

## 2023-03-07 NOTE — ED ADULT NURSE NOTE - DRUG PRE-SCREENING (DAST -1)
OFFICE VISIT      Patient: Juanita Cabrera Specific Gravity 05/13/2021 1.025  1.005 - 1.030 Final   • POCT Occult Blood 05/13/2021 Small* Negative Final   • POCT pH 05/13/2021 7.0  5 - 7 Final   • POCT Protein 05/13/2021 Negative  Negative, 30 mg/dL, 100 mg/dL, 300 mg/dL, >=2000 mg/dL, 15 mg/dL, 250 mg/dL, >= 300 mg/dL mg/dL Final   • POCT Urobilinogen 05/13/2021 0.2  0.0 - 1.0 mg/dL Final   • Urine Nitrite 05/13/2021 Negative  Negative Final   • WBC (Leukocyte) Esterase POC 05/13/2021 Negative  Negative Final   Lab Services on 04/15/2021   Component Date Value Ref Range Status   • SARS-CoV-2 by PCR 04/15/2021 Not Detected  Not Detected / Detected / Inhibitor Present Final    Pooled sample   • Isolation Guidelines 04/15/2021    Final                    Value:This result contains rich text formatting which cannot be displayed here.   • Procedural Notes 04/15/2021    Final                    Value:This result contains rich text formatting which cannot be displayed here.       TSH (mcUnits/mL)   Date Value   10/07/2020 2.054         Lab Results Reviewed and Diagnostic Data Reviewed  Narrative & Impression   Exam: Pelvic ultrasound on 6/9/2021 2:15 PM     History: Pelvic pain female     Comparison: 10/19/2020     Technique: Multiple transabdominal and endovaginal  sonographic images were  obtained throughout the pelvis.     Findings:   Subserosal uterine fibroid is noted measuring 1.3 cm x 1.5 cm.  The uterus  is otherwise normal in appearance.  The uterus measures up to 8.9 cm x 4.3  cm x 4.0 cm.  The endometrium measures up to 4 mm in thickness.  Nabothian  cysts are noted within the cervical parenchyma.  The ovaries are normal in  size, shape and echotexture with no adnexal masses noted and internal flow  demonstrated. The right ovary measures 2.3 cm x 1.8 cm x 1.7 cm and the  left ovary measures 3.4 cm x 1.9 cm x 1.8 cm.       No free fluid is seen in the pelvis.     IMPRESSION:  Impression:  1.  Uterine  Statement Selected

## 2023-03-07 NOTE — ED PROVIDER NOTE - PHYSICAL EXAMINATION
Gen: Well appearing in NAD  Head: NC/AT  Neck: trachea midline  Resp:  No distress, lungs cta b/l  Cardiac: Heart bradycardic rate and regular rhythm. No murmur.   Abdomen: Soft, nontender, nondistended.   Ext: no deformities  Neuro:  A&O appears non focal  Skin:  Warm and dry as visualized  Psych:  Normal affect and mood

## 2023-03-07 NOTE — CONSULT NOTE ADULT - SUBJECTIVE AND OBJECTIVE BOX
89 Yo gentleman with multiple comorbidities, and history of prostate cancer diagnosed in 2016, now with metastatic prostate cancer with bone involvement with potential lung involvement on Axumin PET scan on ADT by urologist, received palliative radiation therapy 5 fractions to T8 lesions, sent for nursing home because of worsening anemia.  Last clinic visit with primary oncologist was in January 2023.  Plan was for radium 223 after RT but did not return for follow-up since January 2023.  Has diffuse pain.  Likely related to meds.    Physical Exam:  Constitutional: alert and oriented, in no acute distress   Neck: Soft and supple  Respiratory: Clear to auscultation bilaterally  Cardiovascular: Regular rate and rhyhtm  Gastrointestinal: Soft, non-tender to palpation, +bs  Vascular: 2+ peripheral pulses  Neurological: A/O x 3  Musculoskeletal: no lower extremity edema bilaterally    ICU Vital Signs Last 24 Hrs  T(C): 36.8 (07 Mar 2023 10:19), Max: 36.8 (07 Mar 2023 10:19)  T(F): 98.2 (07 Mar 2023 10:19), Max: 98.2 (07 Mar 2023 10:19)  HR: 50 (07 Mar 2023 10:53) (50 - 50)  BP: 175/83 (07 Mar 2023 10:53) (161/67 - 175/83)  BP(mean): 119 (07 Mar 2023 10:53) (119 - 119)  ABP: --  ABP(mean): --  RR: 20 (07 Mar 2023 10:53) (16 - 20)  SpO2: 99% (07 Mar 2023 10:53) (98% - 99%)  O2 Parameters below as of 07 Mar 2023 10:53  Patient On (Oxygen Delivery Method): room air    CBC:            7.6    3.55  )-----------( 129      ( 03-07-23 @ 11:40 )             24.5     MEDICATIONS  (STANDING):  acetaminophen     Tablet .. 975 milliGRAM(s) Oral once

## 2023-03-07 NOTE — ED PROVIDER NOTE - OBJECTIVE STATEMENT
89 yo male history of metastatic prostate CA to the bone, spinal stenosis, anemia, bradycardia hx of pacemaker, HTN presents after outpatient labs revealed hemoglobin of 6.6. Patient states that he has experienced generalized weakness. He has been transfused in the past for his chronic anemia. Denies melena, bloody stools, hematemesis. Denies fever/chills, cough, sore throat, sob, chest pain, abd pain, urinary complaints. He completed radiation therapy for his prostate cancer. Patient was previously on a blood thinner but then stopped taking the blood thinner per his Cardiologist's recommendations.

## 2023-03-07 NOTE — ED PROVIDER NOTE - PATIENT PORTAL LINK FT
You can access the FollowMyHealth Patient Portal offered by [facility] by registering at the following website: [email or web address]. By joining [employer]’s FollowMyHealth portal, you will also be able to view your health information using other applications (apps) compatible with our system.

## 2023-03-07 NOTE — ED PROVIDER NOTE - NSFOLLOWUPINSTRUCTIONS_ED_ALL_ED_FT
Followup with your primary care physician and Hematologist, Dr. Ferrara. Return to the ED should your symptoms worsen.     Anemia    Anemia is a condition in which the concentration of red blood cells or hemoglobin in the blood is below normal. Hemoglobin is a substance in red blood cells that carries oxygen to the tissues of the body. Anemia results in not enough oxygen reaching these tissues which can cause symptoms such as weakness, dizziness/lightheadedness, shortness of breath, chest pain, paleness, or nausea. The cause of your anemia may or may not be determined immediately. If your hemoglobin was dangerously low, you may have received a blood transfusion. Usually reactions to transfusions occur immediately but monitor yourself for any fevers, rash, or shortness of breath.    SEEK IMMEDIATE MEDICAL CARE IF YOU HAVE ANY OF THE FOLLOWING SYMPTOMS: extreme weakness/chest pain/shortness of breath, black or bloody stools, vomiting blood, fainting, fever, or any signs of dehydration.

## 2023-03-07 NOTE — ED PROVIDER NOTE - NSICDXPASTSURGICALHX_GEN_ALL_CORE_FT
PAST SURGICAL HISTORY:  H/O total knee replacement, left     History of esophageal surgery     Pacemaker [place] [employer]    S/P cataract surgery     S/P colectomy 1992    S/P TURP

## 2023-03-27 ENCOUNTER — INPATIENT (INPATIENT)
Facility: HOSPITAL | Age: 88
LOS: 6 days | Discharge: NURSING, REHAB OR LONG-TERM CARE | DRG: 722 | End: 2023-04-03
Attending: STUDENT IN AN ORGANIZED HEALTH CARE EDUCATION/TRAINING PROGRAM | Admitting: FAMILY MEDICINE
Payer: MEDICARE

## 2023-03-27 VITALS — DIASTOLIC BLOOD PRESSURE: 57 MMHG | HEART RATE: 53 BPM | SYSTOLIC BLOOD PRESSURE: 93 MMHG

## 2023-03-27 DIAGNOSIS — D63.0 ANEMIA IN NEOPLASTIC DISEASE: ICD-10-CM

## 2023-03-27 DIAGNOSIS — Z98.890 OTHER SPECIFIED POSTPROCEDURAL STATES: Chronic | ICD-10-CM

## 2023-03-27 DIAGNOSIS — Z96.652 PRESENCE OF LEFT ARTIFICIAL KNEE JOINT: Chronic | ICD-10-CM

## 2023-03-27 DIAGNOSIS — Z95.0 PRESENCE OF CARDIAC PACEMAKER: Chronic | ICD-10-CM

## 2023-03-27 DIAGNOSIS — Z98.49 CATARACT EXTRACTION STATUS, UNSPECIFIED EYE: Chronic | ICD-10-CM

## 2023-03-27 DIAGNOSIS — R09.89 OTHER SPECIFIED SYMPTOMS AND SIGNS INVOLVING THE CIRCULATORY AND RESPIRATORY SYSTEMS: ICD-10-CM

## 2023-03-27 DIAGNOSIS — Z90.79 ACQUIRED ABSENCE OF OTHER GENITAL ORGAN(S): Chronic | ICD-10-CM

## 2023-03-27 DIAGNOSIS — Z90.49 ACQUIRED ABSENCE OF OTHER SPECIFIED PARTS OF DIGESTIVE TRACT: Chronic | ICD-10-CM

## 2023-03-27 LAB
ALBUMIN SERPL ELPH-MCNC: 3.1 G/DL — LOW (ref 3.3–5.2)
ALP SERPL-CCNC: 364 U/L — HIGH (ref 40–120)
ALT FLD-CCNC: 31 U/L — SIGNIFICANT CHANGE UP
ANION GAP SERPL CALC-SCNC: 15 MMOL/L — SIGNIFICANT CHANGE UP (ref 5–17)
ANISOCYTOSIS BLD QL: SLIGHT — SIGNIFICANT CHANGE UP
APPEARANCE UR: ABNORMAL
APTT BLD: 27 SEC — LOW (ref 27.5–35.5)
AST SERPL-CCNC: 59 U/L — HIGH
BACTERIA # UR AUTO: ABNORMAL
BASE EXCESS BLDV CALC-SCNC: -4.4 MMOL/L — LOW (ref -2–3)
BASOPHILS # BLD AUTO: 0 K/UL — SIGNIFICANT CHANGE UP (ref 0–0.2)
BASOPHILS NFR BLD AUTO: 0 % — SIGNIFICANT CHANGE UP (ref 0–2)
BILIRUB SERPL-MCNC: 0.4 MG/DL — SIGNIFICANT CHANGE UP (ref 0.4–2)
BILIRUB UR-MCNC: NEGATIVE — SIGNIFICANT CHANGE UP
BLD GP AB SCN SERPL QL: SIGNIFICANT CHANGE UP
BUN SERPL-MCNC: 36.3 MG/DL — HIGH (ref 8–20)
CA-I SERPL-SCNC: 1.07 MMOL/L — LOW (ref 1.15–1.33)
CALCIUM SERPL-MCNC: 8 MG/DL — LOW (ref 8.4–10.5)
CHLORIDE BLDV-SCNC: 116 MMOL/L — HIGH (ref 96–108)
CHLORIDE SERPL-SCNC: 114 MMOL/L — HIGH (ref 96–108)
CO2 SERPL-SCNC: 20 MMOL/L — LOW (ref 22–29)
COLOR SPEC: YELLOW — SIGNIFICANT CHANGE UP
CREAT SERPL-MCNC: 1.06 MG/DL — SIGNIFICANT CHANGE UP (ref 0.5–1.3)
DACRYOCYTES BLD QL SMEAR: SLIGHT — SIGNIFICANT CHANGE UP
DIFF PNL FLD: ABNORMAL
EGFR: 66 ML/MIN/1.73M2 — SIGNIFICANT CHANGE UP
ELLIPTOCYTES BLD QL SMEAR: SLIGHT — SIGNIFICANT CHANGE UP
EOSINOPHIL # BLD AUTO: 0.17 K/UL — SIGNIFICANT CHANGE UP (ref 0–0.5)
EOSINOPHIL NFR BLD AUTO: 2.7 % — SIGNIFICANT CHANGE UP (ref 0–6)
EPI CELLS # UR: SIGNIFICANT CHANGE UP
GAS PNL BLDA: SIGNIFICANT CHANGE UP
GAS PNL BLDV: 146 MMOL/L — HIGH (ref 136–145)
GAS PNL BLDV: SIGNIFICANT CHANGE UP
GAS PNL BLDV: SIGNIFICANT CHANGE UP
GIANT PLATELETS BLD QL SMEAR: PRESENT — SIGNIFICANT CHANGE UP
GLUCOSE BLDV-MCNC: 171 MG/DL — HIGH (ref 70–99)
GLUCOSE SERPL-MCNC: 163 MG/DL — HIGH (ref 70–99)
GLUCOSE UR QL: NEGATIVE MG/DL — SIGNIFICANT CHANGE UP
HCO3 BLDV-SCNC: 21 MMOL/L — LOW (ref 22–29)
HCT VFR BLD CALC: 19.4 % — CRITICAL LOW (ref 39–50)
HCT VFR BLDA CALC: 18 % — SIGNIFICANT CHANGE UP
HGB BLD CALC-MCNC: <6.9 G/DL — CRITICAL LOW (ref 12.6–17.4)
HGB BLD-MCNC: 5.7 G/DL — CRITICAL LOW (ref 13–17)
INR BLD: 1.37 RATIO — HIGH (ref 0.88–1.16)
KETONES UR-MCNC: NEGATIVE — SIGNIFICANT CHANGE UP
LACTATE BLDV-MCNC: 2.6 MMOL/L — HIGH (ref 0.5–2)
LACTATE SERPL-SCNC: 2.7 MMOL/L — HIGH (ref 0.5–2)
LEUKOCYTE ESTERASE UR-ACNC: NEGATIVE — SIGNIFICANT CHANGE UP
LYMPHOCYTES # BLD AUTO: 1.4 K/UL — SIGNIFICANT CHANGE UP (ref 1–3.3)
LYMPHOCYTES # BLD AUTO: 22.1 % — SIGNIFICANT CHANGE UP (ref 13–44)
MANUAL SMEAR VERIFICATION: SIGNIFICANT CHANGE UP
MCHC RBC-ENTMCNC: 27.1 PG — SIGNIFICANT CHANGE UP (ref 27–34)
MCHC RBC-ENTMCNC: 29.4 GM/DL — LOW (ref 32–36)
MCV RBC AUTO: 92.4 FL — SIGNIFICANT CHANGE UP (ref 80–100)
METAMYELOCYTES # FLD: 1.8 % — HIGH (ref 0–0)
MONOCYTES # BLD AUTO: 0.34 K/UL — SIGNIFICANT CHANGE UP (ref 0–0.9)
MONOCYTES NFR BLD AUTO: 5.3 % — SIGNIFICANT CHANGE UP (ref 2–14)
MYELOCYTES NFR BLD: 6.2 % — HIGH (ref 0–0)
NEUTROPHILS # BLD AUTO: 3.92 K/UL — SIGNIFICANT CHANGE UP (ref 1.8–7.4)
NEUTROPHILS NFR BLD AUTO: 58.4 % — SIGNIFICANT CHANGE UP (ref 43–77)
NEUTS BAND # BLD: 3.5 % — SIGNIFICANT CHANGE UP (ref 0–8)
NITRITE UR-MCNC: NEGATIVE — SIGNIFICANT CHANGE UP
NRBC # BLD: 7 /100 — HIGH (ref 0–0)
PCO2 BLDV: 34 MMHG — LOW (ref 42–55)
PH BLDV: 7.39 — SIGNIFICANT CHANGE UP (ref 7.32–7.43)
PH UR: 5 — SIGNIFICANT CHANGE UP (ref 5–8)
PLAT MORPH BLD: NORMAL — SIGNIFICANT CHANGE UP
PLATELET # BLD AUTO: 135 K/UL — LOW (ref 150–400)
PO2 BLDV: 197 MMHG — HIGH (ref 25–45)
POIKILOCYTOSIS BLD QL AUTO: SLIGHT — SIGNIFICANT CHANGE UP
POLYCHROMASIA BLD QL SMEAR: SLIGHT — SIGNIFICANT CHANGE UP
POTASSIUM BLDV-SCNC: 4.6 MMOL/L — SIGNIFICANT CHANGE UP (ref 3.5–5.1)
POTASSIUM SERPL-MCNC: 4.7 MMOL/L — SIGNIFICANT CHANGE UP (ref 3.5–5.3)
POTASSIUM SERPL-SCNC: 4.7 MMOL/L — SIGNIFICANT CHANGE UP (ref 3.5–5.3)
PROT SERPL-MCNC: 6.8 G/DL — SIGNIFICANT CHANGE UP (ref 6.6–8.7)
PROT UR-MCNC: 15
PROTHROM AB SERPL-ACNC: 16 SEC — HIGH (ref 10.5–13.4)
RAPID RVP RESULT: SIGNIFICANT CHANGE UP
RBC # BLD: 2.1 M/UL — LOW (ref 4.2–5.8)
RBC # FLD: 19 % — HIGH (ref 10.3–14.5)
RBC BLD AUTO: ABNORMAL
RBC CASTS # UR COMP ASSIST: SIGNIFICANT CHANGE UP /HPF (ref 0–4)
SAO2 % BLDV: 98.5 % — SIGNIFICANT CHANGE UP
SARS-COV-2 RNA SPEC QL NAA+PROBE: SIGNIFICANT CHANGE UP
SODIUM SERPL-SCNC: 149 MMOL/L — HIGH (ref 135–145)
SP GR SPEC: 1.01 — SIGNIFICANT CHANGE UP (ref 1.01–1.02)
UROBILINOGEN FLD QL: 1 MG/DL
WBC # BLD: 6.34 K/UL — SIGNIFICANT CHANGE UP (ref 3.8–10.5)
WBC # FLD AUTO: 6.34 K/UL — SIGNIFICANT CHANGE UP (ref 3.8–10.5)
WBC UR QL: SIGNIFICANT CHANGE UP /HPF (ref 0–5)

## 2023-03-27 PROCEDURE — 71045 X-RAY EXAM CHEST 1 VIEW: CPT | Mod: 26

## 2023-03-27 PROCEDURE — 99223 1ST HOSP IP/OBS HIGH 75: CPT

## 2023-03-27 PROCEDURE — 99291 CRITICAL CARE FIRST HOUR: CPT

## 2023-03-27 PROCEDURE — 93970 EXTREMITY STUDY: CPT | Mod: 26,59

## 2023-03-27 PROCEDURE — 93970 EXTREMITY STUDY: CPT | Mod: 26

## 2023-03-27 PROCEDURE — 71250 CT THORAX DX C-: CPT | Mod: 26,MH

## 2023-03-27 PROCEDURE — 93010 ELECTROCARDIOGRAM REPORT: CPT

## 2023-03-27 PROCEDURE — 70450 CT HEAD/BRAIN W/O DYE: CPT | Mod: 26,MH

## 2023-03-27 RX ORDER — PANTOPRAZOLE SODIUM 20 MG/1
40 TABLET, DELAYED RELEASE ORAL ONCE
Refills: 0 | Status: DISCONTINUED | OUTPATIENT
Start: 2023-03-27 | End: 2023-04-03

## 2023-03-27 RX ORDER — ACETAMINOPHEN 500 MG
650 TABLET ORAL ONCE
Refills: 0 | Status: COMPLETED | OUTPATIENT
Start: 2023-03-27 | End: 2023-03-27

## 2023-03-27 RX ORDER — FUROSEMIDE 40 MG
20 TABLET ORAL DAILY
Refills: 0 | Status: DISCONTINUED | OUTPATIENT
Start: 2023-03-28 | End: 2023-03-28

## 2023-03-27 RX ORDER — ACETAMINOPHEN 500 MG
650 TABLET ORAL ONCE
Refills: 0 | Status: DISCONTINUED | OUTPATIENT
Start: 2023-03-27 | End: 2023-03-27

## 2023-03-27 RX ORDER — HYDROMORPHONE HYDROCHLORIDE 2 MG/ML
0.5 INJECTION INTRAMUSCULAR; INTRAVENOUS; SUBCUTANEOUS ONCE
Refills: 0 | Status: DISCONTINUED | OUTPATIENT
Start: 2023-03-27 | End: 2023-03-27

## 2023-03-27 RX ORDER — FUROSEMIDE 40 MG
20 TABLET ORAL ONCE
Refills: 0 | Status: COMPLETED | OUTPATIENT
Start: 2023-03-27 | End: 2023-03-28

## 2023-03-27 RX ORDER — FUROSEMIDE 40 MG
1 TABLET ORAL
Qty: 0 | Refills: 0 | DISCHARGE

## 2023-03-27 RX ORDER — LANOLIN ALCOHOL/MO/W.PET/CERES
1 CREAM (GRAM) TOPICAL
Refills: 0 | DISCHARGE

## 2023-03-27 RX ORDER — LISINOPRIL 2.5 MG/1
1 TABLET ORAL
Qty: 0 | Refills: 0 | DISCHARGE

## 2023-03-27 RX ORDER — OXYCODONE HYDROCHLORIDE 5 MG/1
5 TABLET ORAL ONCE
Refills: 0 | Status: DISCONTINUED | OUTPATIENT
Start: 2023-03-27 | End: 2023-03-28

## 2023-03-27 RX ORDER — FENTANYL CITRATE 50 UG/ML
1 INJECTION INTRAVENOUS
Refills: 0 | Status: DISCONTINUED | OUTPATIENT
Start: 2023-03-27 | End: 2023-04-02

## 2023-03-27 RX ORDER — PIPERACILLIN AND TAZOBACTAM 4; .5 G/20ML; G/20ML
3.38 INJECTION, POWDER, LYOPHILIZED, FOR SOLUTION INTRAVENOUS EVERY 8 HOURS
Refills: 0 | Status: COMPLETED | OUTPATIENT
Start: 2023-03-27 | End: 2023-04-03

## 2023-03-27 RX ORDER — ACETAMINOPHEN 500 MG
650 TABLET ORAL EVERY 6 HOURS
Refills: 0 | Status: DISCONTINUED | OUTPATIENT
Start: 2023-03-27 | End: 2023-04-03

## 2023-03-27 RX ORDER — TAMSULOSIN HYDROCHLORIDE 0.4 MG/1
0.4 CAPSULE ORAL AT BEDTIME
Refills: 0 | Status: DISCONTINUED | OUTPATIENT
Start: 2023-03-27 | End: 2023-04-03

## 2023-03-27 RX ORDER — ATORVASTATIN CALCIUM 80 MG/1
10 TABLET, FILM COATED ORAL AT BEDTIME
Refills: 0 | Status: DISCONTINUED | OUTPATIENT
Start: 2023-03-27 | End: 2023-04-03

## 2023-03-27 RX ORDER — CARVEDILOL PHOSPHATE 80 MG/1
3.12 CAPSULE, EXTENDED RELEASE ORAL EVERY 12 HOURS
Refills: 0 | Status: DISCONTINUED | OUTPATIENT
Start: 2023-03-27 | End: 2023-03-31

## 2023-03-27 RX ORDER — ERYTHROPOIETIN 10000 [IU]/ML
50 INJECTION, SOLUTION INTRAVENOUS; SUBCUTANEOUS
Refills: 0 | DISCHARGE

## 2023-03-27 RX ORDER — METHOCARBAMOL 500 MG/1
1 TABLET, FILM COATED ORAL
Qty: 0 | Refills: 0 | DISCHARGE

## 2023-03-27 RX ORDER — FENTANYL CITRATE 50 UG/ML
1 INJECTION INTRAVENOUS
Refills: 0 | DISCHARGE

## 2023-03-27 RX ORDER — PANTOPRAZOLE SODIUM 20 MG/1
40 TABLET, DELAYED RELEASE ORAL EVERY 12 HOURS
Refills: 0 | Status: DISCONTINUED | OUTPATIENT
Start: 2023-03-27 | End: 2023-04-03

## 2023-03-27 RX ORDER — APIXABAN 2.5 MG/1
1 TABLET, FILM COATED ORAL
Qty: 0 | Refills: 0 | DISCHARGE

## 2023-03-27 RX ADMIN — PIPERACILLIN AND TAZOBACTAM 25 GRAM(S): 4; .5 INJECTION, POWDER, LYOPHILIZED, FOR SOLUTION INTRAVENOUS at 22:37

## 2023-03-27 RX ADMIN — HYDROMORPHONE HYDROCHLORIDE 0.5 MILLIGRAM(S): 2 INJECTION INTRAMUSCULAR; INTRAVENOUS; SUBCUTANEOUS at 17:53

## 2023-03-27 RX ADMIN — FENTANYL CITRATE 1 PATCH: 50 INJECTION INTRAVENOUS at 22:18

## 2023-03-27 RX ADMIN — ATORVASTATIN CALCIUM 10 MILLIGRAM(S): 80 TABLET, FILM COATED ORAL at 22:37

## 2023-03-27 RX ADMIN — TAMSULOSIN HYDROCHLORIDE 0.4 MILLIGRAM(S): 0.4 CAPSULE ORAL at 22:37

## 2023-03-27 NOTE — H&P ADULT - NSICDXPASTSURGICALHX_GEN_ALL_CORE_FT
PAST SURGICAL HISTORY:  H/O total knee replacement, left     History of esophageal surgery     Pacemaker [place] [employer]    S/P cataract surgery     S/P colectomy 1992    S/P TURP

## 2023-03-27 NOTE — H&P ADULT - HISTORY OF PRESENT ILLNESS
pt. is a 89y old male with a past medical history significant for prostate cancer with mets to the bone s/p radiation, colon cancer (remote >30 years) s/p resection and anemia who presents from rehab with generalized weakness which is ongoing as per pt's daughter, pt. is mostly bed ridden. confusion ? at Rehab. in the ER Hb 5.7,   stool occult positive. T max in the .6,The patient and the daughter deny any recent nausea, vomiting, abdominal pain, melena, diarrhea or rectal bleeding. no cp. no sob. noted to have edema of upper / lower extremities.   He was recently admitted to [facility] for anemia earlier this month.   His last colonoscopy was in 2020 at [facility] for questionable GI bleeding after eating beets, he was told it was negative and that he would never need a colonoscopy again.  pt. is a 89y old male with a past medical history significant for prostate cancer with mets to the bone s/p radiation, colon cancer (remote >30 years) s/p resection and anemia who presents from rehab with generalized weakness which is ongoing as per pt's daughter, pt. is mostly bed ridden. confusion ? at Rehab. in the ER Hb 5.7,   stool occult positive. T max in the .6, got zosyn. The patient and the daughter deny any recent nausea, vomiting, abdominal pain, melena, diarrhea or rectal bleeding. no cp. no sob. noted to have edema of upper / lower extremities.   He was recently admitted to [facility] for anemia earlier this month.   His last colonoscopy was in 2020 at [place] for questionable GI bleeding after eating beets, he was told it was negative and that he would never need a colonoscopy again. pt. [place] [facility].

## 2023-03-27 NOTE — ED PROVIDER NOTE - NS ED ROS FT
Constitutional: + fever, no chills  Head: NC, AT   Eyes: no redness   ENMT: no nasal congestion/drainage, no sore throat   CV: no chest pain, +chronic edema  Resp: no cough, + dyspnea  GI: no abdominal pain, no nausea, no vomiting, +Chronic incontinence   MSK: +RUE pain  Skin: no lesions, no rashes   Neuro: no LOC, no headache, no sensory deficits, +upper extremity weakness

## 2023-03-27 NOTE — H&P ADULT - PROBLEM SELECTOR PLAN 3
Taylor Garza is a 46 year old female presenting to the walk-in clinic today for \"I have been having migraines,\" \"I felt warm,\" dizziness, vomiting and headaches that started this morning. Pt reports she has not taken anything to relieve symptoms.         Swabs/Specimens collected during rooming process:    None- Pt refused covid testing.         BP greater than 140/90: No    Recheck completed: No         PPE worn during room process    Writer: N95, Face shield/eye protection, gown, gloves    Patient: Mask         Patient would like communication of their results via:         Cell Phone:   Telephone Information:   Mobile [phone]     Okay to leave a message containing results? Yes     s/p turp, radiation, bones to mets, likely contributing to pt's anemia   hem/ onc consult , [facility] [place].

## 2023-03-27 NOTE — ED PROVIDER NOTE - OBJECTIVE STATEMENT
89 year old male 89 year old male w hx of prostate cancer with mets to the bone s/p radiation, colon cancer, anemia requiring transfusion who presents with AMS. Pt noted to be febrile 102.6, dyspneic, and per [facility] also with upper extremity weakness. At bedside, pt complaining of RUE/R hand pain. *additional documentation on downtime (paper) documentation*

## 2023-03-27 NOTE — CONSULT NOTE ADULT - SUBJECTIVE AND OBJECTIVE BOX
Patient is a 89y old  Male who presents with a chief complaint of     BRIEF HOSPITAL COURSE: Patient is a 89 yo male with PMH of atrial fibrillation (not on AC 2/2 anemia), PPM, Stage 4 prostate cancer with metastatic disease to the bones whom presented from the nursing home for tachypnea. Patients daughter at bedside reports that her father looked unwell when she saw him yesterday, but more so today. On arrival here to the ER patient tachypneic and SpO2 91%, placed on 2.5L NC. Difficulty obtaining IV access, therefore ER placed a CVC. Temp 102.6F, BP 90/60mmHg. Rpt BP post 400cc 145/65mmHg. Given Ofirmev, Vancomycin and Zosyn and cultured. Labs revealed anemia, positive stool occult, respiratory alkalosis. GI and ICU consulted. Patient hemodynamically stable, on 2.5L NC. Patient only endorsing low back pain, which he normally has a fentanyl patch for (not currently on).       PAST MEDICAL & SURGICAL HISTORY:  Hypertension  Irregular heart rate  Pacemaker [place] [employer]  Bradycardia  Glaucoma  Prostate cancer  Colon cancer  S/P colectomy   H/O total knee replacement, left  S/P TURP  S/P cataract surgery  History of esophageal surgery    Allergies    No Known Allergies    Intolerances      FAMILY HISTORY:  Family hx of colon cancer    FH: throat cancer (Sibling)    Review of Systems:  CONSTITUTIONAL: No fever, chills, or fatigue  EYES: No eye pain, visual disturbances, or discharge  ENMT:  No difficulty hearing, tinnitus, vertigo; No sinus or throat pain  NECK: No pain or stiffness  RESPIRATORY: No cough, wheezing, chills or hemoptysis; No shortness of breath  CARDIOVASCULAR: No chest pain, palpitations, dizziness, or leg swelling  GASTROINTESTINAL: No abdominal or epigastric pain. No nausea, vomiting, or hematemesis; No diarrhea or constipation. No melena or hematochezia.  GENITOURINARY: No dysuria, frequency, hematuria, or incontinence  NEUROLOGICAL: No headaches, memory loss, loss of strength, numbness, or tremors  SKIN: No itching, burning, rashes, or lesions   MUSCULOSKELETAL: ++BACK PAIN No joint pain or swelling; No muscle, or extremity pain  PSYCHIATRIC: No depression, anxiety, mood swings, or difficulty sleeping    Medications:  furosemide   Injectable 20 milliGRAM(s) IV Push once  pantoprazole  Injectable 40 milliGRAM(s) IV Push Once  pantoprazole  Injectable 40 milliGRAM(s) IV Push every 12 hours    ICU Vital Signs Last 24 Hrs  T(C): 37.8 (27 Mar 2023 17:51), Max: 39.2 (27 Mar 2023 12:15)  T(F): 100 (27 Mar 2023 17:51), Max: 102.6 (27 Mar 2023 12:15)  HR: 50 (27 Mar 2023 17:51) (50 - 71)  BP: 132/62 (27 Mar 2023 17:51) (93/57 - 198/81)  RR: 20 (27 Mar 2023 17:51) (20 - 28)  SpO2: 100% (27 Mar 2023 17:51) (99% - 100%)    O2 Parameters below as of 27 Mar 2023 17:51  Patient On (Oxygen Delivery Method): nasal cannula  O2 Flow (L/min): 2    Vital Signs Last 24 Hrs  T(C): 37.8 (27 Mar 2023 17:51), Max: 39.2 (27 Mar 2023 12:15)  T(F): 100 (27 Mar 2023 17:51), Max: 102.6 (27 Mar 2023 12:15)  HR: 50 (27 Mar 2023 17:51) (50 - 71)  BP: 132/62 (27 Mar 2023 17:51) (93/57 - 198/81)  BP(mean): --  RR: 20 (27 Mar 2023 17:51) (20 - 28)  SpO2: 100% (27 Mar 2023 17:51) (99% - 100%)    Parameters below as of 27 Mar 2023 17:51  Patient On (Oxygen Delivery Method): nasal cannula  O2 Flow (L/min): 2      ABG - ( 27 Mar 2023 12:10 )  pH, Arterial: 7.530 pH, Blood: x     /  pCO2: 26    /  pO2: 130   / HCO3: 22    / Base Excess: -1.0  /  SaO2: 99.1            I&O's Detail        LABS:                        5.7    6.34  )-----------( 135      ( 27 Mar 2023 11:29 )             19.4     03-    149<H>  |  114<H>  |  36.3<H>  ----------------------------<  163<H>  4.7   |  20.0<L>  |  1.06    Ca    8.0<L>      27 Mar 2023 11:29  Mg     2.5         TPro  6.8  /  Alb  3.1<L>  /  TBili  0.4  /  DBili  x   /  AST  59<H>  /  ALT  31  /  AlkPhos  364<H>        CARDIAC MARKERS ( 27 Mar 2023 11:29 )  x     / 0.05 ng/mL / x     / x     / x          CAPILLARY BLOOD GLUCOSE        PT/INR - ( 27 Mar 2023 11:36 )   PT: 16.0 sec;   INR: 1.37 ratio         PTT - ( 27 Mar 2023 11:36 )  PTT:27.0 sec  Urinalysis Basic - ( 27 Mar 2023 13:25 )    Color: Yellow / Appearance: Slightly Turbid / S.010 / pH: x  Gluc: x / Ketone: Negative  / Bili: Negative / Urobili: 1 mg/dL   Blood: x / Protein: 15 / Nitrite: Negative   Leuk Esterase: Negative / RBC: 0-2 /HPF / WBC 0-2 /HPF   Sq Epi: x / Non Sq Epi: Occasional / Bacteria: Few      CULTURES:        Physical Examination:    General: No acute distress.  Alert, oriented x 3 (person, place - hospital, but didn't know which once, date - year didn't know the month, said it was february).     HEENT: Pupils equal, reactive to light.    PULM: +cough, sputum clear, lungs CTA b/l     CVS: Regular rate and rhythm    ABD: Soft, nondistended, nontender, normoactive bowel sounds, no masses    EXT: 2+ pitting edema in UE and LE bilaterally     SKIN: Warm and well perfused

## 2023-03-27 NOTE — CONSULT NOTE ADULT - ASSESSMENT
A/P: Patient is a 89 yo male with 89 yo male with PMH of atrial fibrillation (not on AC 2/2 anemia), PPM, Stage 4 prostate cancer with metastatic disease to the bones whom presented from the nursing home for tachypnea now with:     1. Anemia   2. Thrombocytopenia  3. Lactemia    Patient Hgb 5.7.   Anemia chronic, last transfusion was on 3/14/23 for Hgb 6.2; multifactorial in the setting of cancer (mets to bones) and radiation.   +Occult positive.   No overt signs of GI bleeding.   Hemodynamically stable.   1U pRBC ordered.   Trend Hgb.   Protonix BID.   GI following.   Platelets 135, no indication for transfusion at this time.   Lactate 2.5.  Abg 7.53/26/130.  UA negative.   CTH negative.   CT Chest with atelectasis.   Received Vanco/Zosyn.   F/u cultures.   Pain control PRN, would benefit from fentanyl.   Does not require ICU level of care.   Discussed with daughter whom was at bedside.   Seen and discussed with Dr. Urbano.

## 2023-03-27 NOTE — H&P ADULT - NSHPPHYSICALEXAM_GEN_ALL_CORE
Vital Signs Last 24 Hrs  T(C): 36.4 (27 Mar 2023 20:00), Max: 39.2 (27 Mar 2023 12:15)  T(F): 97.5 (27 Mar 2023 20:00), Max: 102.6 (27 Mar 2023 12:15)  HR: 50 (27 Mar 2023 20:00) (50 - 71)  BP: 119/54 (27 Mar 2023 20:00) (93/57 - 198/81)  BP(mean): 71 (27 Mar 2023 20:00) (71 - 78)  RR: 17 (27 Mar 2023 20:00) (17 - 28)  SpO2: 100% (27 Mar 2023 20:00) (99% - 100%)    Parameters below as of 27 Mar 2023 20:00  Patient On (Oxygen Delivery Method): nasal cannula  O2 Flow (L/min): 2    General: An elderly male in bed not in distress  eyes :  NC. PERRL. intact EOM.  HENT: AT, no throat erythema or exudate.   Neck: supple. no JVD.   Chest: CTA bilaterally  Heart: S1,S2. RRR. no heart murmur. + edema of upper and lower ext  Abdomen: soft. non-tender. non-distended. + BS.   rectal : deferred at this point, was done in the the ER, brown stool occult blood positive resulted.   Ext: no calf tenderness. moves slowly all ext. on his own   vascular : distal pulses intact  Neuro: Alert, awake, tired appearing but non focal, appears at his baseline , daughter at bedside.   psych : mood ok, no si/hi  Skin: warm, dry.   psych : mood ok, no si/hi. Vital Signs Last 24 Hrs  T(C): 36.4 (27 Mar 2023 20:00), Max: 39.2 (27 Mar 2023 12:15)  T(F): 97.5 (27 Mar 2023 20:00), Max: 102.6 (27 Mar 2023 12:15)  HR: 50 (27 Mar 2023 20:00) (50 - 71)  BP: 119/54 (27 Mar 2023 20:00) (93/57 - 198/81)  BP(mean): 71 (27 Mar 2023 20:00) (71 - 78)  RR: 17 (27 Mar 2023 20:00) (17 - 28)  SpO2: 100% (27 Mar 2023 20:00) (99% - 100%)    Parameters below as of 27 Mar 2023 20:00  Patient On (Oxygen Delivery Method): nasal cannula  O2 Flow (L/min): 2    General: An elderly male in bed not in distress  eyes :  NC. PERRL. intact EOM.  HENT: AT, no throat erythema or exudate.   Neck: supple. no JVD.   Chest: CTA bilaterally  Heart: S1,S2. RRR. no heart murmur. + edema of upper and lower ext  Abdomen: soft. non-tender. non-distended. + BS.   rectal : deferred at this point, was done in the the ER, brown stool occult blood positive resulted.   Ext: no calf tenderness. moves slowly all ext. on his own   vascular : distal pulses intact  Neuro: Alert, awake, tired appearing but non focal, appears at his baseline , daughter at bedside.   psych : mood ok, no si/hi  Skin: small stage 2 sacral ulcer, warm, dry.   psych : mood ok, no si/hi.

## 2023-03-27 NOTE — ED ADULT NURSE NOTE - NSICDXPASTSURGICALHX_GEN_ALL_CORE_FT
PAST SURGICAL HISTORY:  H/O total knee replacement, left     History of esophageal surgery     Pacemaker [place] [employer]    S/P cataract surgery     S/P colectomy 1992    S/P TURP

## 2023-03-27 NOTE — CONSULT NOTE ADULT - SUBJECTIVE AND OBJECTIVE BOX
HISTORY OF PRESENT ILLNESS: This is a 89y old man with a past medical history significant for prostate cancer with mets to the bone s/p radiation, colon cancer (remote >30 years) s/p resection and anemia who presents from rehab with altered mental status. GI was consulted for occult positive anemia. History obtained from daughter (an NP) at bedside. She states that she is worried that he has a fever. The patient and the daughter deny any recent nausea, vomiting, abdominal pain, melena, diarrhea or rectal bleeding.    He was recently admitted to [facility] for anemia earlier this month.   His last colonoscopy was in  at [place] for questionable GI bleeding after eating beets, he was told it was negative and that he would never need a colonoscopy again.     On his med list he is being prescribed pantoprazole    ROS: A 14-point review of systems was completed and was otherwise negative save what was reported in the HPI.    PAST MEDICAL/SURGICAL HISTORY:  Hypertension    Irregular heart rate    Pacemaker  [employer]    Bradycardia    Glaucoma    Prostate cancer    Colon cancer    S/P colectomy      H/O total knee replacement, left    Pacemaker  [place] [employer]    S/P TURP    S/P cataract surgery    History of esophageal surgery      SOCIAL HISTORY:  - TOBACCO: Denies  - ALCOHOL: Denies  - ILLICIT DRUG USE: Denies    FAMILY HISTORY:  No known history of gastrointestinal or liver disease;  Family hx of colon cancer    FH: throat cancer (Sibling)        HOME MEDICATIONS:  acetaminophen 325 mg oral tablet: 2 tab(s) orally every 6 hours, As needed, Temp greater or equal to 38C (100.4F), Mild Pain (1 - 3) (23 Sep 2022 12:17)  amLODIPine 5 mg oral tablet: 1 tab(s) orally once a day (2023 16:22)  atorvastatin 40 mg oral tablet: 1 tab(s) orally once a day (at bedtime) (23 Sep 2022 12:17)  Eliquis 5 mg oral tablet: 1 tab(s) orally 2 times a day (14 May 2019 10:29)  Flomax 0.4 mg oral capsule: 2 cap(s) orally once a day (18 Sep 2022 11:03)  Lasix 40 mg oral tablet: 1 tab(s) orally once a day (18 Sep 2022 11:10)  lisinopril 40 mg oral tablet: 1 tab(s) orally once a day (18 Sep 2022 11:05)  methocarbamol 750 mg oral tablet: 1 tab(s) orally 3 times a day, As Needed - for muscle spasm (2023 16:22)  potassium chloride 20 mEq oral tablet, extended release: 1 tab(s) orally 3 times a day (2023 16:22)  Protonix 40 mg oral delayed release tablet: 1 tab(s) orally once a day (18 Sep 2022 11:05)  zolpidem 5 mg oral tablet: 1 tab(s) orally once a day (at bedtime) (2023 16:13)    INPATIENT MEDICATIONS:  MEDICATIONS  (STANDING):    MEDICATIONS  (PRN):    ALLERGIES:  No Known Allergies    T(C): 38.6 (23 @ 15:30), Max: 39.2 (23 @ 12:15)  HR: 50 (23 @ 15:30) (50 - 71)  BP: 145/64 (23 @ 15:30) (93/57 - 198/81)  RR: 24 (23 @ 14:30) (22 - 28)  SpO2: 99% (23 @ 14:30) (99% - 100%)      PHYSICAL EXAM:  Constitutional: in no apparent distress  Eyes: Sclerae anicteric, conjunctivae normal  ENMT: Mucus membranes moist, no oropharyngeal thrush noted  Respiratory: Breathing nonlabored; clear to auscultation  Cardiovascular: Regular rate and rhythm  Gastrointestinal: Soft, nontender, nondistended, normoactive bowel sounds no rebound tenderness or involuntary guarding well healed ex lap scar   Extremities: bilateral upper extremity  Neurological: Alert and oriented to person,   Skin: No jaundice  Musculoskeletal: No significant peripheral atrophy  Psychiatric: Affect and mood appropriate      LABS:             5.7    6.34  )-----------( 135      (  @ 11:29 )             19.4       PT/INR - ( 27 Mar 2023 11:36 )   PT: 16.0 sec;   INR: 1.37 ratio         PTT - ( 27 Mar 2023 11:36 )  PTT:27.0 sec      149<H>  |  114<H>  |  36.3<H>  ----------------------------<  163<H>  4.7   |  20.0<L>  |  1.06    Ca    8.0<L>      27 Mar 2023 11:29  Mg     2.5         TPro  6.8  /  Alb  3.1<L>  /  TBili  0.4  /  DBili  x   /  AST  59<H>  /  ALT  31  /  AlkPhos  364<H>      LIVER FUNCTIONS - ( 27 Mar 2023 11:29 )  Alb: 3.1 g/dL / Pro: 6.8 g/dL / ALK PHOS: 364 U/L / ALT: 31 U/L / AST: 59 U/L / GGT: x               Ferritin, Serum: 7955 ng/mL *H* (23 @ 11:29)  % Saturation, Iron: 31 % (23 @ 11:29)  Iron - Total Binding Capacity.: 180 ug/dL *L* (23 @ 11:29)  Iron Total, Serum: 55 ug/dL *L* (23 @ 11:29)    Urinalysis Basic - ( 27 Mar 2023 13:25 )    Color: Yellow / Appearance: Slightly Turbid / S.010 / pH: x  Gluc: x / Ketone: Negative  / Bili: Negative / Urobili: 1 mg/dL   Blood: x / Protein: 15 / Nitrite: Negative   Leuk Esterase: Negative / RBC: 0-2 /HPF / WBC 0-2 /HPF   Sq Epi: x / Non Sq Epi: Occasional / Bacteria: Few        IMAGING: I personally reviewed the XXXX, and I agree with the radiologist's interpretation as described below:

## 2023-03-27 NOTE — H&P ADULT - ASSESSMENT
pt. is a 89y old male with a past medical history significant for prostate cancer with mets to the bone s/p radiation, colon cancer (remote >30 years) s/p resection and anemia who presents from rehab with generalized weakness which is ongoing as per pt's daughter, pt. is mostly bed ridden. confusion ? at Rehab. in the ER Hb 5.7,   stool occult positive. tmax 102.5 in the ER, got zosyn. pt. will be admitted for drop in Hb    - Drop in hemoglobin   - likely multifactorial, no evidence of active GI bleed as per GI   possible bone marrow involvement from prostate cancer and mets to bones.  Transfuse 2 units of prbc  will keep on iv protonix  follow cbc after 2 units, goal to keep Hb above7 or close to 8.    - SIRS   pt. had fever 102. 5 etiology ?     - prostate cancer with mets    -  pt. is a 89y old male with a past medical history significant for prostate cancer with mets to the bone s/p radiation, colon cancer (remote >30 years) s/p resection and anemia who presents from rehab with generalized weakness which is ongoing as per pt's daughter, pt. is mostly bed ridden. confusion ? at Rehab. in the ER Hb 5.7,   stool occult positive. tmax 102.5 in the ER, got zosyn. pt. will be admitted for drop in Hb    - Drop in hemoglobin   - likely multifactorial, no evidence of active GI bleed as per GI   possible bone marrow involvement from prostate cancer and mets to bones.  Transfuse 2 units of prbc  will keep on iv protonix  follow cbc after 2 units, goal to keep Hb above7 or close to 8.    - SIRS   pt. had fever 102. 5 etiology ?   will keep on zosyn, follow all cultures    - prostate cancer with mets  s/p turp, radiation, bones to mets, likely contributing to pt's anemia   hem/ onc consult , [facility] [place].     - cardiac pacemaker  - no cp, trop negative x 1     - plan discussed with pt's daughter at bedside.  pt. is a 89y old male with a past medical history significant for prostate cancer with mets to the bone s/p radiation, colon cancer (remote >30 years) s/p resection and anemia who presents from rehab with generalized weakness which is ongoing as per pt's daughter, pt. is mostly bed ridden. confusion ? at Rehab. in the ER Hb 5.7,   stool occult positive. tmax 102.5 in the ER, got zosyn. pt. will be admitted for drop in Hb.  plan discussed with pt's daughter at bedside.

## 2023-03-27 NOTE — ED PROVIDER NOTE - CARE PLAN
1 Principal Discharge DX:	Anemia in neoplastic disease  Secondary Diagnosis:	Respiratory alkalosis  Secondary Diagnosis:	Fever

## 2023-03-27 NOTE — ED PROVIDER NOTE - HIV OFFER
Previously Declined (within the last year) never intubated  Continue current regimen and medications

## 2023-03-27 NOTE — ED PROVIDER NOTE - ATTENDING CONTRIBUTION TO CARE
The patent seen immediately with resident    MARIYA INTERIANO, Simon Grimaldo, performed the initial face to face bedside interview with this patient regarding history of present illness, review of symptoms and relevant past medical, social and family history.  I completed an independent physical examination.  I was the initial provider who evaluated this patient. I have signed out the follow up of any pending tests (i.e. labs, radiological studies) to the resident.  I have communicated the patient’s plan of care and disposition with the resident

## 2023-03-27 NOTE — CONSULT NOTE ADULT - ASSESSMENT
89 year old male with metastatic prostate cancer s/p radiation who presents with anemia    Anemia  Despite occult positive, no evidence of active GIB  Recommend conservative management PPI daily  Avoid nsaids  Recommend hematology evaluation for anemia   No plans for endoscopic evaluation at this time  Monitor and maintain HGB >7-8    Complete infectious work up  Discussed with daughter at bedside

## 2023-03-28 DIAGNOSIS — C61 MALIGNANT NEOPLASM OF PROSTATE: ICD-10-CM

## 2023-03-28 DIAGNOSIS — R65.10 SYSTEMIC INFLAMMATORY RESPONSE SYNDROME (SIRS) OF NON-INFECTIOUS ORIGIN WITHOUT ACUTE ORGAN DYSFUNCTION: ICD-10-CM

## 2023-03-28 DIAGNOSIS — Z95.0 PRESENCE OF CARDIAC PACEMAKER: ICD-10-CM

## 2023-03-28 DIAGNOSIS — I10 ESSENTIAL (PRIMARY) HYPERTENSION: ICD-10-CM

## 2023-03-28 DIAGNOSIS — R71.0 PRECIPITOUS DROP IN HEMATOCRIT: ICD-10-CM

## 2023-03-28 LAB
ALBUMIN SERPL ELPH-MCNC: 2.5 G/DL — LOW (ref 3.3–5.2)
ALP SERPL-CCNC: 279 U/L — HIGH (ref 40–120)
ALT FLD-CCNC: 22 U/L — SIGNIFICANT CHANGE UP
ANION GAP SERPL CALC-SCNC: 13 MMOL/L — SIGNIFICANT CHANGE UP (ref 5–17)
ANISOCYTOSIS BLD QL: SLIGHT — SIGNIFICANT CHANGE UP
AST SERPL-CCNC: 35 U/L — SIGNIFICANT CHANGE UP
BASOPHILS # BLD AUTO: 0 K/UL — SIGNIFICANT CHANGE UP (ref 0–0.2)
BASOPHILS NFR BLD AUTO: 0 % — SIGNIFICANT CHANGE UP (ref 0–2)
BILIRUB SERPL-MCNC: 0.6 MG/DL — SIGNIFICANT CHANGE UP (ref 0.4–2)
BUN SERPL-MCNC: 33.3 MG/DL — HIGH (ref 8–20)
BURR CELLS BLD QL SMEAR: PRESENT — SIGNIFICANT CHANGE UP
CALCIUM SERPL-MCNC: 7.1 MG/DL — LOW (ref 8.4–10.5)
CHLORIDE SERPL-SCNC: 115 MMOL/L — HIGH (ref 96–108)
CO2 SERPL-SCNC: 19 MMOL/L — LOW (ref 22–29)
CREAT SERPL-MCNC: 0.96 MG/DL — SIGNIFICANT CHANGE UP (ref 0.5–1.3)
CULTURE RESULTS: SIGNIFICANT CHANGE UP
DACRYOCYTES BLD QL SMEAR: SLIGHT — SIGNIFICANT CHANGE UP
EGFR: 75 ML/MIN/1.73M2 — SIGNIFICANT CHANGE UP
EOSINOPHIL # BLD AUTO: 0.04 K/UL — SIGNIFICANT CHANGE UP (ref 0–0.5)
EOSINOPHIL NFR BLD AUTO: 0.9 % — SIGNIFICANT CHANGE UP (ref 0–6)
FIBRINOGEN PPP-MCNC: 458 MG/DL — HIGH (ref 200–450)
GIANT PLATELETS BLD QL SMEAR: PRESENT — SIGNIFICANT CHANGE UP
GLUCOSE SERPL-MCNC: 152 MG/DL — HIGH (ref 70–99)
HCT VFR BLD CALC: 21.4 % — LOW (ref 39–50)
HCT VFR BLD CALC: 27.1 % — LOW (ref 39–50)
HGB BLD-MCNC: 6.5 G/DL — CRITICAL LOW (ref 13–17)
HGB BLD-MCNC: 8.1 G/DL — LOW (ref 13–17)
LACTATE BLDV-MCNC: 1.3 MMOL/L — SIGNIFICANT CHANGE UP (ref 0.5–2)
LACTATE SERPL-SCNC: 2.7 MMOL/L — HIGH (ref 0.5–2)
LDH SERPL L TO P-CCNC: 658 U/L — HIGH (ref 98–192)
LYMPHOCYTES # BLD AUTO: 0.61 K/UL — LOW (ref 1–3.3)
LYMPHOCYTES # BLD AUTO: 15.4 % — SIGNIFICANT CHANGE UP (ref 13–44)
MANUAL SMEAR VERIFICATION: SIGNIFICANT CHANGE UP
MCHC RBC-ENTMCNC: 27.1 PG — SIGNIFICANT CHANGE UP (ref 27–34)
MCHC RBC-ENTMCNC: 27.2 PG — SIGNIFICANT CHANGE UP (ref 27–34)
MCHC RBC-ENTMCNC: 29.9 GM/DL — LOW (ref 32–36)
MCHC RBC-ENTMCNC: 30.4 GM/DL — LOW (ref 32–36)
MCV RBC AUTO: 89.2 FL — SIGNIFICANT CHANGE UP (ref 80–100)
MCV RBC AUTO: 90.9 FL — SIGNIFICANT CHANGE UP (ref 80–100)
METAMYELOCYTES # FLD: 3.4 % — HIGH (ref 0–0)
MICROCYTES BLD QL: SLIGHT — SIGNIFICANT CHANGE UP
MONOCYTES # BLD AUTO: 0.3 K/UL — SIGNIFICANT CHANGE UP (ref 0–0.9)
MONOCYTES NFR BLD AUTO: 7.7 % — SIGNIFICANT CHANGE UP (ref 2–14)
MYELOCYTES NFR BLD: 3.4 % — HIGH (ref 0–0)
NEUTROPHILS # BLD AUTO: 2.7 K/UL — SIGNIFICANT CHANGE UP (ref 1.8–7.4)
NEUTROPHILS NFR BLD AUTO: 61.5 % — SIGNIFICANT CHANGE UP (ref 43–77)
NEUTS BAND # BLD: 6.8 % — SIGNIFICANT CHANGE UP (ref 0–8)
NRBC # BLD: 2 /100 — HIGH (ref 0–0)
NRBC # BLD: 5 /100 WBCS — HIGH (ref 0–0)
OVALOCYTES BLD QL SMEAR: SLIGHT — SIGNIFICANT CHANGE UP
PLAT MORPH BLD: NORMAL — SIGNIFICANT CHANGE UP
PLATELET # BLD AUTO: 90 K/UL — LOW (ref 150–400)
PLATELET # BLD AUTO: 91 K/UL — LOW (ref 150–400)
POIKILOCYTOSIS BLD QL AUTO: SIGNIFICANT CHANGE UP
POLYCHROMASIA BLD QL SMEAR: SIGNIFICANT CHANGE UP
POTASSIUM SERPL-MCNC: 4.1 MMOL/L — SIGNIFICANT CHANGE UP (ref 3.5–5.3)
POTASSIUM SERPL-SCNC: 4.1 MMOL/L — SIGNIFICANT CHANGE UP (ref 3.5–5.3)
PROT SERPL-MCNC: 5.7 G/DL — LOW (ref 6.6–8.7)
RBC # BLD: 2.4 M/UL — LOW (ref 4.2–5.8)
RBC # BLD: 2.98 M/UL — LOW (ref 4.2–5.8)
RBC # FLD: 18.4 % — HIGH (ref 10.3–14.5)
RBC # FLD: 18.9 % — HIGH (ref 10.3–14.5)
RBC BLD AUTO: ABNORMAL
SODIUM SERPL-SCNC: 147 MMOL/L — HIGH (ref 135–145)
SPECIMEN SOURCE: SIGNIFICANT CHANGE UP
VARIANT LYMPHS # BLD: 0.9 % — SIGNIFICANT CHANGE UP (ref 0–6)
WBC # BLD: 3.95 K/UL — SIGNIFICANT CHANGE UP (ref 3.8–10.5)
WBC # BLD: 4.63 K/UL — SIGNIFICANT CHANGE UP (ref 3.8–10.5)
WBC # FLD AUTO: 3.95 K/UL — SIGNIFICANT CHANGE UP (ref 3.8–10.5)
WBC # FLD AUTO: 4.63 K/UL — SIGNIFICANT CHANGE UP (ref 3.8–10.5)

## 2023-03-28 PROCEDURE — 99233 SBSQ HOSP IP/OBS HIGH 50: CPT

## 2023-03-28 PROCEDURE — 99497 ADVNCD CARE PLAN 30 MIN: CPT | Mod: 25

## 2023-03-28 PROCEDURE — 99223 1ST HOSP IP/OBS HIGH 75: CPT

## 2023-03-28 PROCEDURE — 99498 ADVNCD CARE PLAN ADDL 30 MIN: CPT | Mod: 25

## 2023-03-28 RX ORDER — TRAMADOL HYDROCHLORIDE 50 MG/1
50 TABLET ORAL EVERY 8 HOURS
Refills: 0 | Status: DISCONTINUED | OUTPATIENT
Start: 2023-03-28 | End: 2023-03-28

## 2023-03-28 RX ORDER — TRAMADOL HYDROCHLORIDE 50 MG/1
50 TABLET ORAL EVERY 6 HOURS
Refills: 0 | Status: DISCONTINUED | OUTPATIENT
Start: 2023-03-28 | End: 2023-03-29

## 2023-03-28 RX ORDER — ACETAMINOPHEN 500 MG
325 TABLET ORAL ONCE
Refills: 0 | Status: COMPLETED | OUTPATIENT
Start: 2023-03-28 | End: 2023-03-28

## 2023-03-28 RX ORDER — SENNA PLUS 8.6 MG/1
2 TABLET ORAL AT BEDTIME
Refills: 0 | Status: DISCONTINUED | OUTPATIENT
Start: 2023-03-28 | End: 2023-04-03

## 2023-03-28 RX ADMIN — Medication 325 MILLIGRAM(S): at 01:42

## 2023-03-28 RX ADMIN — TRAMADOL HYDROCHLORIDE 50 MILLIGRAM(S): 50 TABLET ORAL at 15:15

## 2023-03-28 RX ADMIN — TAMSULOSIN HYDROCHLORIDE 0.4 MILLIGRAM(S): 0.4 CAPSULE ORAL at 21:12

## 2023-03-28 RX ADMIN — Medication 20 MILLIGRAM(S): at 01:43

## 2023-03-28 RX ADMIN — Medication 325 MILLIGRAM(S): at 02:30

## 2023-03-28 RX ADMIN — FENTANYL CITRATE 1 PATCH: 50 INJECTION INTRAVENOUS at 07:33

## 2023-03-28 RX ADMIN — OXYCODONE HYDROCHLORIDE 5 MILLIGRAM(S): 5 TABLET ORAL at 05:26

## 2023-03-28 RX ADMIN — Medication 1 TABLET(S): at 12:26

## 2023-03-28 RX ADMIN — FENTANYL CITRATE 1 PATCH: 50 INJECTION INTRAVENOUS at 18:59

## 2023-03-28 RX ADMIN — Medication 200 MILLIGRAM(S): at 21:25

## 2023-03-28 RX ADMIN — TRAMADOL HYDROCHLORIDE 50 MILLIGRAM(S): 50 TABLET ORAL at 22:26

## 2023-03-28 RX ADMIN — PIPERACILLIN AND TAZOBACTAM 25 GRAM(S): 4; .5 INJECTION, POWDER, LYOPHILIZED, FOR SOLUTION INTRAVENOUS at 05:27

## 2023-03-28 RX ADMIN — Medication 650 MILLIGRAM(S): at 21:11

## 2023-03-28 RX ADMIN — PANTOPRAZOLE SODIUM 40 MILLIGRAM(S): 20 TABLET, DELAYED RELEASE ORAL at 05:27

## 2023-03-28 RX ADMIN — Medication 650 MILLIGRAM(S): at 12:26

## 2023-03-28 RX ADMIN — PANTOPRAZOLE SODIUM 40 MILLIGRAM(S): 20 TABLET, DELAYED RELEASE ORAL at 17:34

## 2023-03-28 RX ADMIN — TRAMADOL HYDROCHLORIDE 50 MILLIGRAM(S): 50 TABLET ORAL at 21:26

## 2023-03-28 RX ADMIN — PIPERACILLIN AND TAZOBACTAM 25 GRAM(S): 4; .5 INJECTION, POWDER, LYOPHILIZED, FOR SOLUTION INTRAVENOUS at 14:19

## 2023-03-28 RX ADMIN — Medication 200 MILLIGRAM(S): at 05:30

## 2023-03-28 RX ADMIN — ATORVASTATIN CALCIUM 10 MILLIGRAM(S): 80 TABLET, FILM COATED ORAL at 21:12

## 2023-03-28 RX ADMIN — Medication 650 MILLIGRAM(S): at 02:30

## 2023-03-28 RX ADMIN — TRAMADOL HYDROCHLORIDE 50 MILLIGRAM(S): 50 TABLET ORAL at 14:19

## 2023-03-28 RX ADMIN — Medication 20 MILLIGRAM(S): at 05:26

## 2023-03-28 RX ADMIN — Medication 650 MILLIGRAM(S): at 13:40

## 2023-03-28 RX ADMIN — PIPERACILLIN AND TAZOBACTAM 25 GRAM(S): 4; .5 INJECTION, POWDER, LYOPHILIZED, FOR SOLUTION INTRAVENOUS at 21:12

## 2023-03-28 RX ADMIN — Medication 650 MILLIGRAM(S): at 00:40

## 2023-03-28 NOTE — CONSULT NOTE ADULT - SUBJECTIVE AND OBJECTIVE BOX
HPI:  Limited/ Unable to obtain secondary to poor historian  pt. is a 89y old male with a past medical history significant for prostate cancer with mets to the bone s/p radiation, colon cancer (remote >30 years) s/p resection and anemia who presents from rehab with generalized weakness which is ongoing as per pt's daughter, pt. is mostly bed ridden. confusion ? at Rehab. in the ER Hb 5.7,   stool occult positive. T max in the .6, got zosyn. The patient and the daughter deny any recent nausea, vomiting, abdominal pain, melena, diarrhea or rectal bleeding. no cp. no sob. noted to have edema of upper / lower extremities.   He was recently admitted to [facility] for anemia earlier this month.   His last colonoscopy was in  at [place] for questionable GI bleeding after eating beets, he was told it was negative and that he would never need a colonoscopy again. pt. [place]  (27 Mar 2023 18:50)      PERTINENT PMH REVIEWED: Yes     PAST MEDICAL & SURGICAL HISTORY:  Hypertension      Irregular heart rate      Pacemaker  [employer]      Bradycardia      Glaucoma      Prostate cancer      Colon cancer      S/P colectomy        H/O total knee replacement, left      Pacemaker  [place] [employer]      S/P TURP      S/P cataract surgery      History of esophageal surgery          SOCIAL HISTORY:                      Substance history: No reported hx substance abuse                    Admitted from:  [facility]                     Sikh/spirituality: [employer]ism                    Cultural concerns:    Baseline ADLs (prior to admission):   Dependent                        Surrogate/HCP/Guardian:     FAMILY HISTORY:  Family hx of colon cancer    FH: throat cancer (Sibling)        Allergies    No Known Allergies    Intolerances        http://npcrc.org/files/news/palliative_performance_scale_[email or web address].pdf    Present Symptoms:   Dyspnea:  No Mild Moderate Severe  Nausea/Vomiting:  No  Yes  Anxiety:   No  Yes  Depression: No Yes Unable  Fatigue: Yes No Unable  Loss of appetite: Yes No  N/A  NPO  Constipation:  Not reported   Yes    Pain:  No  No signs            Character-            Duration-            Factors-            Severity    Pain AD Score:  [email or web address] (press ctrl + left click to view)    Review of Systems: Reviewed    All other ROS negative  Unable to obtain due to poor mentation historian      MEDICATIONS  (STANDING):  atorvastatin 10 milliGRAM(s) Oral at bedtime  carvedilol 3.125 milliGRAM(s) Oral every 12 hours  fentaNYL   Patch  12 MICROgram(s)/Hr 1 Patch Transdermal every 72 hours  furosemide    Tablet 20 milliGRAM(s) Oral daily  multivitamin 1 Tablet(s) Oral daily  pantoprazole  Injectable 40 milliGRAM(s) IV Push Once  pantoprazole  Injectable 40 milliGRAM(s) IV Push every 12 hours  piperacillin/tazobactam IVPB.. 3.375 Gram(s) IV Intermittent every 8 hours  tamsulosin 0.4 milliGRAM(s) Oral at bedtime    MEDICATIONS  (PRN):  acetaminophen     Tablet .. 650 milliGRAM(s) Oral every 6 hours PRN Temp greater or equal to 38C (100.4F), Mild Pain (1 - 3), Moderate Pain (4 - 6)  guaiFENesin Oral Liquid (Sugar-Free) 200 milliGRAM(s) Oral every 6 hours PRN Cough      PHYSICAL EXAM:    Vital Signs Last 24 Hrs  T(C): 37.1 (28 Mar 2023 08:20), Max: 39.2 (27 Mar 2023 12:15)  T(F): 98.8 (28 Mar 2023 08:20), Max: 102.6 (27 Mar 2023 12:15)  HR: 50 (28 Mar 2023 08:00) (50 - 71)  BP: 113/56 (28 Mar 2023 08:00) (93/57 - 198/81)  BP(mean): 71 (28 Mar 2023 08:00) (57 - 93)  RR: 17 (28 Mar 2023 08:00) (17 - 28)  SpO2: 99% (28 Mar 2023 08:00) (98% - 100%)    Parameters below as of 28 Mar 2023 08:00  Patient On (Oxygen Delivery Method): nasal cannula  O2 Flow (L/min): 2      Karnofsky     %  General:    HEENT: NCAT      (  )  ET tube   (    ) NGT  Lungs: comfortable  CV:   GI:           (  )  PEG  MSK: normal   weak  chair/bedbound  Neuro:   Skin: warm dry  Psych:    LABS:                        6.5    3.95  )-----------( 90       ( 28 Mar 2023 06:12 )             21.4     03-28    147<H>  |  115<H>  |  33.3<H>  ----------------------------<  152<H>  4.1   |  19.0<L>  |  0.96    Ca    7.1<L>      28 Mar 2023 06:12  Mg     2.5         TPro  5.7<L>  /  Alb  2.5<L>  /  TBili  0.6  /  DBili  x   /  AST  35  /  ALT  22  /  AlkPhos  279<H>      PT/INR - ( 27 Mar 2023 11:36 )   PT: 16.0 sec;   INR: 1.37 ratio         PTT - ( 27 Mar 2023 11:36 )  PTT:27.0 sec  Urinalysis Basic - ( 27 Mar 2023 13:25 )    Color: Yellow / Appearance: Slightly Turbid / S.010 / pH: x  Gluc: x / Ketone: Negative  / Bili: Negative / Urobili: 1 mg/dL   Blood: x / Protein: 15 / Nitrite: Negative   Leuk Esterase: Negative / RBC: 0-2 /HPF / WBC 0-2 /HPF   Sq Epi: x / Non Sq Epi: Occasional / Bacteria: Few      I&O's Summary    27 Mar 2023 07:01  -  28 Mar 2023 07:00  --------------------------------------------------------  IN: 650 mL / OUT: 650 mL / NET: 0 mL    28 Mar 2023 07:01  -  28 Mar 2023 10:25  --------------------------------------------------------  IN: 0 mL / OUT: 200 mL / NET: -200 mL        RADIOLOGY & ADDITIONAL STUDIES:          ADVANCE DIRECTIVES/TREATMENT PREFERENCES:  Currently Full code, all aggressive measures desired   DNR/DNI - MOLST, continue all other medical treatments  DNR/DNI - MOLST, comfort measures only          HPI:  Limited/ Unable to obtain secondary to poor historian  pt. is a 89y old male with a past medical history significant for prostate cancer with mets to the bone s/p radiation, colon cancer (remote >30 years) s/p resection and anemia who presents from rehab with generalized weakness which is ongoing as per pt's daughter, pt. is mostly bed ridden. confusion ? at Rehab. in the ER Hb 5.7,   stool occult positive. T max in the .6, got zosyn. The patient and the daughter deny any recent nausea, vomiting, abdominal pain, melena, diarrhea or rectal bleeding. no cp. no sob. noted to have edema of upper / lower extremities.   He was recently admitted to [facility] for anemia earlier this month.   His last colonoscopy was in  at [place] for questionable GI bleeding after eating beets, he was told it was negative and that he would never need a colonoscopy again. [place]. [place] [facility].  (27 Mar 2023 18:50)    [facility]  Since then he has declined - very weak,  non ambulatory.  States father's oncologist had planned to start new treatment. He was supposed to have follow up meeting tomorrow.       PERTINENT PMH REVIEWED: Yes     PAST MEDICAL & SURGICAL HISTORY:  Hypertension      Irregular heart rate      Pacemaker  [employer]      Bradycardia      Glaucoma      Prostate cancer      Colon cancer      S/P colectomy        H/O total knee replacement, left      Pacemaker  [place] [employer]      S/P TURP      S/P cataract surgery      History of esophageal surgery          SOCIAL HISTORY:                      Substance history: No reported hx substance abuse                    Admitted from:  [facility]                     Temple/spirituality: [employer]                    Cultural concerns:    Baseline ADLs (prior to admission):   Dependent                        Surrogate/HCP/Guardian:     FAMILY HISTORY:  Family hx of colon cancer    FH: throat cancer (Sibling)        Allergies    No Known Allergies    Intolerances        http://npcrc.org/files/news/palliative_performance_[email or web address]_[email or web address]    Present Symptoms:   Dyspnea:  No   Nausea/Vomiting:  No    Anxiety:   No   Depression:  Unable  Fatigue: Yes   Loss of appetite: Yes   Constipation:  Not reported      Pain:  Poor historian. Daughter reports hx  generalized, upper arms            Character-            Duration-            Factors-            Severity    Pain AD Score:  [email or web address] (press ctrl + left click to view)    Review of Systems: Reviewed    All other ROS negative  Unable to obtain due to poor mentation historian      MEDICATIONS  (STANDING):  atorvastatin 10 milliGRAM(s) Oral at bedtime  carvedilol 3.125 milliGRAM(s) Oral every 12 hours  fentaNYL   Patch  12 MICROgram(s)/Hr 1 Patch Transdermal every 72 hours  furosemide    Tablet 20 milliGRAM(s) Oral daily  multivitamin 1 Tablet(s) Oral daily  pantoprazole  Injectable 40 milliGRAM(s) IV Push Once  pantoprazole  Injectable 40 milliGRAM(s) IV Push every 12 hours  piperacillin/tazobactam IVPB.. 3.375 Gram(s) IV Intermittent every 8 hours  tamsulosin 0.4 milliGRAM(s) Oral at bedtime    MEDICATIONS  (PRN):  acetaminophen     Tablet .. 650 milliGRAM(s) Oral every 6 hours PRN Temp greater or equal to 38C (100.4F), Mild Pain (1 - 3), Moderate Pain (4 - 6)  guaiFENesin Oral Liquid (Sugar-Free) 200 milliGRAM(s) Oral every 6 hours PRN Cough      PHYSICAL EXAM:    Vital Signs Last 24 Hrs  T(C): 37.1 (28 Mar 2023 08:20), Max: 39.2 (27 Mar 2023 12:15)  T(F): 98.8 (28 Mar 2023 08:20), Max: 102.6 (27 Mar 2023 12:15)  HR: 50 (28 Mar 2023 08:00) (50 - 71)  BP: 113/56 (28 Mar 2023 08:00) (93/57 - 198/81)  BP(mean): 71 (28 Mar 2023 08:00) (57 - 93)  RR: 17 (28 Mar 2023 08:00) (17 - 28)  SpO2: 99% (28 Mar 2023 08:00) (98% - 100%)    Parameters below as of 28 Mar 2023 08:00  Patient On (Oxygen Delivery Method): nasal cannula  O2 Flow (L/min): 2    Karnofsky   30  %  General:  M awake, tired appearing NAD  HEENT: NCAT       Lungs: comfortable  CV: RR  GI:   soft NTND  MSK:   weak   Neuro: AOx1  Skin: warm dry  Stage 2 DTI sacrum  Psych: calm    LABS:                        6.5    3.95  )-----------( 90       ( 28 Mar 2023 06:12 )             21.4         147<H>  |  115<H>  |  33.3<H>  ----------------------------<  152<H>  4.1   |  19.0<L>  |  0.96    Ca    7.1<L>      28 Mar 2023 06:12  Mg     2.5         TPro  5.7<L>  /  Alb  2.5<L>  /  TBili  0.6  /  DBili  x   /  AST  35  /  ALT  22  /  AlkPhos  279<H>      PT/INR - ( 27 Mar 2023 11:36 )   PT: 16.0 sec;   INR: 1.37 ratio         PTT - ( 27 Mar 2023 11:36 )  PTT:27.0 sec  Urinalysis Basic - ( 27 Mar 2023 13:25 )    Color: Yellow / Appearance: Slightly Turbid / S.010 / pH: x  Gluc: x / Ketone: Negative  / Bili: Negative / Urobili: 1 mg/dL   Blood: x / Protein: 15 / Nitrite: Negative   Leuk Esterase: Negative / RBC: 0-2 /HPF / WBC 0-2 /HPF   Sq Epi: x / Non Sq Epi: Occasional / Bacteria: Few      I&O's Summary    27 Mar 2023 07:  -  28 Mar 2023 07:00  --------------------------------------------------------  IN: 650 mL / OUT: 650 mL / NET: 0 mL    28 Mar 2023 07:  -  28 Mar 2023 10:25  --------------------------------------------------------  IN: 0 mL / OUT: 200 mL / NET: -200 mL        RADIOLOGY & ADDITIONAL STUDIES:  < from: CT Chest No Cont (23 @ 13:10) >    ACC: [id number] EXAM:  CT CHEST   ORDERED BY: DOCTOR ER     PROCEDURE DATE:  2023          INTERPRETATION:  INDICATION: Anemia, history of prostate cancer    TECHNIQUE: Volumetric images of the chest without intravenous contrast.   Maximum intensity projection images were generated.    COMPARISON: None      FINDINGS:    LUNGS/AIRWAYS/PLEURA: Image dictation by respiratory motion. Narrowed AP   diameter of the trachea due to exhalation which can be seen with   tracheomalacia. Nodular atelectasis in the left lower lobe abutting a   small cyst. Trace pleural effusions.    LYMPH NODES/MEDIASTINUM: No lymphadenopathy. Dilated esophagus.    HEART/VASCULATURE: Enlarged heart. No pericardial effusion. Coronary   artery calcifications. Normal caliber aorta. Leads within the right   atrium and right ventricle.    UPPER ABDOMEN: Not well evaluated on this exam due to artifact.    BONES/SOFT TISSUES: Several sclerotic bone lesions in the axial skeleton.      IMPRESSION:    Trace pleural effusions.    Left lower lobe nodular atelectasis.    Numerous skeletal metastases.    --- End of Report ---      < end of copied text >      < from: Xray Chest 1 View AP/PA. (23 @ 16:23) >  ACC: [id number] EXAM:  XR CHEST AP OR PA 1V   ORDERED BY: DOCTOR ER     PROCEDURE DATE:  2023          INTERPRETATION:  Portable chest radiograph    CLINICAL INFORMATION: Dyspnea, shortness of breath.    TECHNIQUE:  Portable  AP chest radiograph.    COMPARISON: 2023 chest x-ray .    FINDINGS:  CATHETERS AND TUBES: None    PULMONARY: The visualized lungs are clear of airspace consolidations or   pleural effusions.   No pneumothorax.    HEART/VASCULAR: The heart is mildly enlarged in transverse diameter. No   skull base.    BONES: Visualized osseous thorax intact.    IMPRESSION:   No radiographic evidence of active chest disease.    --- End of Report ---        < end of copied text >    < from: CT Head No Cont (23 @ 13:10) >    ACC: [id number] EXAM:  CT BRAIN   ORDERED BY: DOCTOR ER     PROCEDURE DATE:  2023          INTERPRETATION:  CLINICAL STATEMENT: Altered mental status.    TECHNIQUE: Noncontrast CT of the brain was performed. Beam hardening   artifact from the petrous pyramid limits evaluation of the brainstem and   posterior fossa. KV and MA adjusted according to patient's body habitus.   Sagittal and coronal reformatted images provided. Multiple images   degraded by motion artifact.  COMPARISON: The brain 2023.    FINDINGS:    Visualized paranasal sinuses and mastoid air cells appear well-aerated.   No aggressive calvarial or skull base lesion.    Again seen is calcified plaque anterior and posterior circulation. Again   seen is moderate parenchymal atrophy, manifest by sulcal and ventricular   prominence, as well as confluent and scattered foci of hypoattenuation   periventricular and juxtacortical white matter bilateral cerebral   hemispheres.    No evidence of extra-axial collection, intracranial hemorrhage, mass or   mass effect. Gray-white matter differentiation appears preserved.    IMPRESSION:    No significant change compared with 2023.  1. No evidence of acute intracranial hemorrhage, territorial infarction   or mass effect.  2. Moderate parenchymal atrophy and findings most concordant with chronic   microvascular ischemic change.  3. Additional findings described in detail above.    --- End of Report ---          < end of copied text >            ADVANCE DIRECTIVES/TREATMENT PREFERENCES:  Currently Full code, all aggressive measures desired   DNR/DNI - MOLST, continue all other medical treatments  DNR/DNI - MOLST, comfort measures only

## 2023-03-28 NOTE — PROGRESS NOTE ADULT - ASSESSMENT
89y old male with a past medical history significant for prostate cancer with mets to the bone s/p radiation, colon cancer (remote >30 years) s/p resection and anemia who presents from rehab with generalized weakness which is ongoing as per pt's daughter, pt. is mostly bed ridden. confusion ? at Rehab. in the ER Hb 5.7,   stool occult positive. tmax 102.5 in the ER, got zosyn. pt. will be admitted for drop in Hb.  plan discussed with pt's daughter at bedside.        Problem/Plan - 1:  ·  Problem: Acute on chronic anemia    ·  Plan: - likely multifactorial, no evidence of active GI bleed as per GI - FOBT positive   possible bone marrow involvement from prostate cancer and mets to bones.  s/p 2 units of prbc  will keep on iv protonix  follow cbc after 2 units, goal to keep Hb above7 or close to 8.     Problem/Plan - 2:  ·  Problem: Systemic inflammatory response syndrome (SIRS).   ·  Plan: pt. had fever 102. 5 etiology ?  source   will keep on zosyn, follow all cultures.   consider CT abd if continues to be febrile      Problem/Plan - 3:  ·  Problem: metastatic Prostate cancer. stage IV  ·  Plan: s/p turp, radiation, bones to mets, likely contributing to pt's anemia   hem/ onc recs appreciate - recently enzalutamide commenced though usually not much beneficial after progression on Zytiga     Problem/Plan - 4:  ·  Problem: HTN (hypertension).   ·coreg 3.125 mg po bid with parameters.    Overall prognosis is guarded to poor. Appreciate palliative assistance with GOC discussions with family. Await family decision -  ct close monitoring.

## 2023-03-28 NOTE — PROGRESS NOTE ADULT - NS ATTEND AMEND GEN_ALL_CORE FT
Patient seen and examined. Lethargic. NO overt GI bleeding reported. On antibiotics. PPI therapy. Call GI as needed. No plans for endoscopic intervention.

## 2023-03-28 NOTE — CONSULT NOTE ADULT - CONVERSATION DETAILS
Informed by SW daughter inquiring about Palliative and hospice information.    Patient AOx1- does not have capacity to make decisions.  Met with daughter Bharti.  She states father has declined since starting rehab in early February.  She is concerned father is nearing his time and inquired about hospice.  Her brother  2 years ago and sees the signs.   She states though  father had seen his oncologist about 2 weeks ago and was offered another kind of treatment.    Discussed hospice services.  However, would want to know if oncology will still be proceeding with treatments. Expressed my concern that father has been in rehab for nearly 2 months and has not progress.  Will need to see after this hospitalization where he will be with his functionality.   Inquired as to who is the HCP, and informed her mother would be if no form HCP form.  She states mother would want to make the decisions.  She feels father will bounce back from this as he had in the past when he had colon cancer.    Discussed AD- CPR intubation and mechanical  ventilation.  Expressed my concern of its limited benefit if to be resuscitate and intubated in consideration of his advance cancer and age.  Encourage her to speak to her mother about it.    In the meantime, she would like information on hospice.

## 2023-03-28 NOTE — CONSULT NOTE ADULT - NSCONSULTADDITIONALINFOA_GEN_ALL_CORE
Time spent with review of chart documents, labs, imaging. Direct patient assessment,  formulation of care plan. Discussion with  Interdisciplinary  team   nurse, family  including ACP  __50___minutes with   ISTOP review

## 2023-03-28 NOTE — PROGRESS NOTE ADULT - PROBLEM SELECTOR PLAN 1
Normocytic anemia, +FOBT.   Hemoglobin 6.5 gm this morning. No evidence of overt GI bleed  Continue PPI daily  Avoid nsaids  Hematology following,   No plans for endoscopic evaluation at this time  Monitor and maintain HGB >7-8 Normocytic anemia, +FOBT.   Hemoglobin 6.5 gm this morning. No evidence of overt GI bleed  Continue PPI daily  Avoid nsaids  Hematology following, recommendations noted  No plans for endoscopic evaluation planned at this time given his advanced age and comorbidities.   Monitor and maintain HGB >7-8

## 2023-03-28 NOTE — CONSULT NOTE ADULT - ASSESSMENT
89y old male with a past medical history significant for prostate cancer with mets to the bone s/p radiation, colon cancer (remote >30 years) s/p resection and anemia who presents from rehab with generalized weakness which is ongoing as per pt's daughter, pt. is mostly bed ridden.  Recurrent visits from rehab because of similar complaint/anemia.  His last colonoscopy was in 2020 at [place] for questionable GI bleeding, he was told it was negative and that he would never need a colonoscopy again.  Being followed up by Oncology at [facility] with Dr Ferrara, on enzalutamide.  Patient is status post palliative RT to T8 vertebral body (5 fractions).  noted To have severe anemia upon presentation and fever.    Anemia and thrombocytopenia:  - cannot rule out GI blood loss  - could be involvement by prostate cancer of bone marrow for which bone marrow biopsy would be required but would hold off such w/u given the age (already not many options availabke for prostate cancer at this point)  -P.r.n. transfusion to keep hemoglobin more than 7    Stage IV prostate cancer:    -recently enzalutamide commenced though usually not much beneficial after progression on Zytiga  -patient to keep appointment with oncologist upon discharge at [facility]     89y old male with a past medical history significant for prostate cancer with mets to the bone s/p radiation, colon cancer (remote >30 years) s/p resection and anemia who presents from rehab with generalized weakness which is ongoing as per pt's daughter, pt. is mostly bed ridden.  Recurrent visits from rehab because of similar complaint/anemia.  His last colonoscopy was in 2020 at [place] for questionable GI bleeding, he was told it was negative and that he would never need a colonoscopy again.  Being followed up by Oncology at [facility] with Dr Ferrara, plan was to commence enzalutamide.  Patient is status post palliative RT to T8 vertebral body (5 fractions).  noted To have severe anemia upon presentation and fever.    Anemia and thrombocytopenia:  - cannot rule out GI blood loss  - could be involvement by prostate cancer of bone marrow for which bone marrow biopsy would be required but would hold off such w/u given the age (already not many options available for prostate cancer at this point)  -P.r.n. transfusion to keep hemoglobin more than 7    Stage IV prostate cancer:  -recently plan was to commence enzalutamide though usually not much beneficial after progression on Zytiga; was scheduled to start later this week but is now admitted.  -patient to keep appointment with oncologist upon discharge at [facility]

## 2023-03-28 NOTE — CONSULT NOTE ADULT - ASSESSMENT
89yr old man  Hx of  prostate cancer with mets to the bone s/p radiation, past hx of colon cancer, anemia admitted from rehab with significant anemia    Anemia   s/p PRBC transfusion  monitor Hg  No reported active GI bleeding    Prostate Cancer with Mets  Noted Oncology input.  Per daughter- plan was to start new treatment regimen.  Father was supposed to have follow up visit tomorrow    Cancer Related Pain  ISTOP      Encounter for Palliative Care  Palliative Care consulted to assist with goals of care.    See [facility] note above for details.  In summary  1.  Daughter Bharti inquired about hospice services as she feels father's time is nearing.  Will have Hospice SW reach out to her to give information  2.  Informed by daughter Oncology was offered treatments 2 weeks ago.  Will  need to know if treatments will continue after this hospitalization  If not, then can seek hospice services.  3.  Wife is the health care Surrogate when patient does not have capacity  4.  Encouraged daughter to speak to her mother about DNR/I directives.                    89yr old man  Hx of  prostate cancer with mets to the bone s/p radiation, past hx of colon cancer, anemia admitted from rehab with significant anemia    Anemia   s/p PRBC transfusion  monitor Hg  No reported active GI bleeding    Prostate Cancer with Mets  Noted Oncology input.  Per daughter- plan was to start new treatment regimen.  Father was supposed to have follow up visit tomorrow    Cancer Related Pain  ISTOP reviewed -see Event note  Daughter reports Tramadol and Tylenol works well for her father  Cont Fentanyl 12mcg   Added Tramadol 50mg q6hr PRN for severe pain  Continue monitoring symptoms  Bowel regimen - Senna 2 tabs qhs prn    Encounter for Palliative Care  Palliative Care consulted to assist with goals of care.    See GOC note above for details.  In summary  1.  Daughter Bharti inquired about hospice services as she feels father's time is nearing.  Will have Hospice SW reach out to her to give information  2.  Informed by daughter Oncology was offered treatments 2 weeks ago.  Will  need to know if treatments will continue after this hospitalization  If not, then can seek hospice services.  3.  Wife is the health care Surrogate when patient does not have capacity  4.  Encouraged daughter to speak to her mother about DNR/I directives.

## 2023-03-28 NOTE — CONSULT NOTE ADULT - SUBJECTIVE AND OBJECTIVE BOX
89y old male with a past medical history significant for prostate cancer with mets to the bone s/p radiation, colon cancer (remote >30 years) s/p resection and anemia who presents from rehab with generalized weakness which is ongoing as per pt's daughter, pt. is mostly bed ridden.  Recurrent visits from rehab because of similar complaint/anemia.  His last colonoscopy was in 2020 at [place] for questionable GI bleeding, he was told it was negative and that he would never need a colonoscopy again.  Being followed up by Oncology at [facility] with Dr Ferrara, on enzalutamide.  Patient is status post palliative RT to T8 vertebral body (5 fractions).    ICU Vital Signs Last 24 Hrs  T(C): 37.1 (28 Mar 2023 08:20), Max: 39.2 (27 Mar 2023 12:15)  T(F): 98.8 (28 Mar 2023 08:20), Max: 102.6 (27 Mar 2023 12:15)  HR: 50 (28 Mar 2023 08:00) (50 - 71)  BP: 113/56 (28 Mar 2023 08:00) (93/57 - 198/81)  BP(mean): 71 (28 Mar 2023 08:00) (57 - 93)  ABP: --  ABP(mean): --  RR: 17 (28 Mar 2023 08:00) (17 - 28)  SpO2: 99% (28 Mar 2023 08:00) (98% - 100%)    O2 Parameters below as of 28 Mar 2023 08:00  Patient On (Oxygen Delivery Method): nasal cannula  O2 Flow (L/min): 2    Physical examination:  In no apparent distress, breathing comfortably  HEENT: no cervical lymphadenopathy  Chest:  Clear to auscultation bilaterally  CVS:  No murmur/rubs/gallops; regular rate and rhythm  Abdomen:  Nondistended, nontender, bowel sounds normal, no organomegaly  Extremities:  No pedal edema  Neurological: no focal neurolgical deficit     MEDICATIONS  (STANDING):  atorvastatin 10 milliGRAM(s) Oral at bedtime  carvedilol 3.125 milliGRAM(s) Oral every 12 hours  fentaNYL   Patch  12 MICROgram(s)/Hr 1 Patch Transdermal every 72 hours  furosemide    Tablet 20 milliGRAM(s) Oral daily  multivitamin 1 Tablet(s) Oral daily  pantoprazole  Injectable 40 milliGRAM(s) IV Push Once  pantoprazole  Injectable 40 milliGRAM(s) IV Push every 12 hours  piperacillin/tazobactam IVPB.. 3.375 Gram(s) IV Intermittent every 8 hours  tamsulosin 0.4 milliGRAM(s) Oral at bedtime    CBC:            6.5    3.95  )-----------( 90       ( 03-28-23 @ 06:12 )             21.4         Chem:         ( 03-28-23 @ 06:12 )    147<H>  |  115<H>  |  33.3<H>  ----------------------------<  152<H>  4.1   |  19.0<L>  |  0.96        Liver Functions: ( 03-28-23 @ 06:12 )  Alb: 2.5 g/dL / Pro: 5.7 g/dL / ALK PHOS: 279 U/L / ALT: 22 U/L / AST: 35 U/L / GGT: x            Type & Screen:            89y old male with a past medical history significant for prostate cancer with mets to the bone s/p radiation, colon cancer (remote >30 years) s/p resection and anemia who presents from rehab with generalized weakness which is ongoing as per pt's daughter, pt. is mostly bed ridden.  Recurrent visits from rehab because of similar complaint/anemia.  His last colonoscopy was in 2020 at [place] for questionable GI bleeding, he was told it was negative and that he would never need a colonoscopy again.  Being followed up by Oncology at [facility] with Dr Ferrara, plan was to commence enzalutamide.  Patient is status post palliative RT to T8 vertebral body (5 fractions).    ICU Vital Signs Last 24 Hrs  T(C): 37.1 (28 Mar 2023 08:20), Max: 39.2 (27 Mar 2023 12:15)  T(F): 98.8 (28 Mar 2023 08:20), Max: 102.6 (27 Mar 2023 12:15)  HR: 50 (28 Mar 2023 08:00) (50 - 71)  BP: 113/56 (28 Mar 2023 08:00) (93/57 - 198/81)  BP(mean): 71 (28 Mar 2023 08:00) (57 - 93)  ABP: --  ABP(mean): --  RR: 17 (28 Mar 2023 08:00) (17 - 28)  SpO2: 99% (28 Mar 2023 08:00) (98% - 100%)    O2 Parameters below as of 28 Mar 2023 08:00  Patient On (Oxygen Delivery Method): nasal cannula  O2 Flow (L/min): 2    Physical examination:  In no apparent distress, breathing comfortably  HEENT: no cervical lymphadenopathy  Chest:  Clear to auscultation bilaterally  CVS:  No murmur/rubs/gallops; regular rate and rhythm  Abdomen:  Nondistended, nontender, bowel sounds normal, no organomegaly  Extremities:  No pedal edema  Neurological: no focal neurolgical deficit     MEDICATIONS  (STANDING):  atorvastatin 10 milliGRAM(s) Oral at bedtime  carvedilol 3.125 milliGRAM(s) Oral every 12 hours  fentaNYL   Patch  12 MICROgram(s)/Hr 1 Patch Transdermal every 72 hours  furosemide    Tablet 20 milliGRAM(s) Oral daily  multivitamin 1 Tablet(s) Oral daily  pantoprazole  Injectable 40 milliGRAM(s) IV Push Once  pantoprazole  Injectable 40 milliGRAM(s) IV Push every 12 hours  piperacillin/tazobactam IVPB.. 3.375 Gram(s) IV Intermittent every 8 hours  tamsulosin 0.4 milliGRAM(s) Oral at bedtime    CBC:            6.5    3.95  )-----------( 90       ( 03-28-23 @ 06:12 )             21.4         Chem:         ( 03-28-23 @ 06:12 )    147<H>  |  115<H>  |  33.3<H>  ----------------------------<  152<H>  4.1   |  19.0<L>  |  0.96        Liver Functions: ( 03-28-23 @ 06:12 )  Alb: 2.5 g/dL / Pro: 5.7 g/dL / ALK PHOS: 279 U/L / ALT: 22 U/L / AST: 35 U/L / GGT: x            Type & Screen:

## 2023-03-29 LAB
ANION GAP SERPL CALC-SCNC: 16 MMOL/L — SIGNIFICANT CHANGE UP (ref 5–17)
BUN SERPL-MCNC: 26.3 MG/DL — HIGH (ref 8–20)
CALCIUM SERPL-MCNC: 7.3 MG/DL — LOW (ref 8.4–10.5)
CHLORIDE SERPL-SCNC: 109 MMOL/L — HIGH (ref 96–108)
CO2 SERPL-SCNC: 15 MMOL/L — LOW (ref 22–29)
CREAT SERPL-MCNC: 0.92 MG/DL — SIGNIFICANT CHANGE UP (ref 0.5–1.3)
EGFR: 79 ML/MIN/1.73M2 — SIGNIFICANT CHANGE UP
GLUCOSE SERPL-MCNC: 150 MG/DL — HIGH (ref 70–99)
HCT VFR BLD CALC: 24.7 % — LOW (ref 39–50)
HGB BLD-MCNC: 7.2 G/DL — LOW (ref 13–17)
MCHC RBC-ENTMCNC: 27.2 PG — SIGNIFICANT CHANGE UP (ref 27–34)
MCHC RBC-ENTMCNC: 29.1 GM/DL — LOW (ref 32–36)
MCV RBC AUTO: 93.2 FL — SIGNIFICANT CHANGE UP (ref 80–100)
NRBC # BLD: 6 /100 WBCS — HIGH (ref 0–0)
PLATELET # BLD AUTO: 98 K/UL — LOW (ref 150–400)
POTASSIUM SERPL-MCNC: 4.8 MMOL/L — SIGNIFICANT CHANGE UP (ref 3.5–5.3)
POTASSIUM SERPL-SCNC: 4.8 MMOL/L — SIGNIFICANT CHANGE UP (ref 3.5–5.3)
RBC # BLD: 2.65 M/UL — LOW (ref 4.2–5.8)
RBC # FLD: 19.3 % — HIGH (ref 10.3–14.5)
SODIUM SERPL-SCNC: 140 MMOL/L — SIGNIFICANT CHANGE UP (ref 135–145)
WBC # BLD: 5.25 K/UL — SIGNIFICANT CHANGE UP (ref 3.8–10.5)
WBC # FLD AUTO: 5.25 K/UL — SIGNIFICANT CHANGE UP (ref 3.8–10.5)

## 2023-03-29 PROCEDURE — 99233 SBSQ HOSP IP/OBS HIGH 50: CPT

## 2023-03-29 PROCEDURE — 99497 ADVNCD CARE PLAN 30 MIN: CPT

## 2023-03-29 RX ORDER — TRAMADOL HYDROCHLORIDE 50 MG/1
50 TABLET ORAL EVERY 6 HOURS
Refills: 0 | Status: DISCONTINUED | OUTPATIENT
Start: 2023-03-29 | End: 2023-04-03

## 2023-03-29 RX ORDER — OXYCODONE HYDROCHLORIDE 5 MG/1
5 TABLET ORAL EVERY 6 HOURS
Refills: 0 | Status: DISCONTINUED | OUTPATIENT
Start: 2023-03-29 | End: 2023-04-03

## 2023-03-29 RX ADMIN — Medication 650 MILLIGRAM(S): at 13:59

## 2023-03-29 RX ADMIN — TRAMADOL HYDROCHLORIDE 50 MILLIGRAM(S): 50 TABLET ORAL at 10:46

## 2023-03-29 RX ADMIN — Medication 200 MILLIGRAM(S): at 21:27

## 2023-03-29 RX ADMIN — PANTOPRAZOLE SODIUM 40 MILLIGRAM(S): 20 TABLET, DELAYED RELEASE ORAL at 17:14

## 2023-03-29 RX ADMIN — PIPERACILLIN AND TAZOBACTAM 25 GRAM(S): 4; .5 INJECTION, POWDER, LYOPHILIZED, FOR SOLUTION INTRAVENOUS at 05:41

## 2023-03-29 RX ADMIN — TAMSULOSIN HYDROCHLORIDE 0.4 MILLIGRAM(S): 0.4 CAPSULE ORAL at 21:27

## 2023-03-29 RX ADMIN — TRAMADOL HYDROCHLORIDE 50 MILLIGRAM(S): 50 TABLET ORAL at 19:59

## 2023-03-29 RX ADMIN — Medication 1 TABLET(S): at 13:23

## 2023-03-29 RX ADMIN — Medication 650 MILLIGRAM(S): at 21:48

## 2023-03-29 RX ADMIN — PANTOPRAZOLE SODIUM 40 MILLIGRAM(S): 20 TABLET, DELAYED RELEASE ORAL at 05:41

## 2023-03-29 RX ADMIN — FENTANYL CITRATE 1 PATCH: 50 INJECTION INTRAVENOUS at 18:59

## 2023-03-29 RX ADMIN — TRAMADOL HYDROCHLORIDE 50 MILLIGRAM(S): 50 TABLET ORAL at 10:16

## 2023-03-29 RX ADMIN — FENTANYL CITRATE 1 PATCH: 50 INJECTION INTRAVENOUS at 08:10

## 2023-03-29 RX ADMIN — ATORVASTATIN CALCIUM 10 MILLIGRAM(S): 80 TABLET, FILM COATED ORAL at 21:28

## 2023-03-29 RX ADMIN — PIPERACILLIN AND TAZOBACTAM 25 GRAM(S): 4; .5 INJECTION, POWDER, LYOPHILIZED, FOR SOLUTION INTRAVENOUS at 16:36

## 2023-03-29 RX ADMIN — TRAMADOL HYDROCHLORIDE 50 MILLIGRAM(S): 50 TABLET ORAL at 21:00

## 2023-03-29 RX ADMIN — Medication 650 MILLIGRAM(S): at 16:36

## 2023-03-29 RX ADMIN — PIPERACILLIN AND TAZOBACTAM 25 GRAM(S): 4; .5 INJECTION, POWDER, LYOPHILIZED, FOR SOLUTION INTRAVENOUS at 21:27

## 2023-03-29 NOTE — PROGRESS NOTE ADULT - ASSESSMENT
89yr old man  Hx of  prostate cancer with mets to the bone s/p radiation, past hx of colon cancer, anemia admitted from rehab with significant anemia    Anemia   s/p PRBC transfusion  drop in Hg  monitor Hg  No reported active GI bleeding    Prostate Cancer with Mets  Informed daughter of oncology input.  She wants to see how things go    Fever  cont IV abx  f/u cultures    Cancer Related Pain  ISTOP reviewed -see Event note  Cont Fentanyl 12mcg   Tramadol 50mg q6hr PRN for severe pain  Continue monitoring symptoms  Bowel regimen - Senna 2 tabs qhs prn    Encounter for Palliative Care  Patient more alert this am though not fully engaging in conversation  Updated daughter on current condition with drop in Hg.  Informed daughter of overall poor prognosis given father has been declining for the last 2 months in rehab and per oncology, new tx regimen not much beneficial  after progression with Zytiga  She states she will speak with her father- but wants to see how things go.  Encouraged her to discuss DNR directives as CPR will not have any significant benefit given his comorbid medical issues and advance age.   Emotional support     89yr old man  Hx of  prostate cancer with mets to the bone s/p radiation, past hx of colon cancer, anemia admitted from rehab with significant anemia    Anemia   s/p PRBC transfusion  drop in Hg  monitor Hg  No reported active GI bleeding    Prostate Cancer with Mets  Informed daughter of oncology input.  She wants to see how things go    Fever  cont IV abx  f/u cultures    Cancer Related Pain  ISTOP reviewed -see Event note  Cont Fentanyl 12mcg   Tramadol 50mg q6hr PRN for severe pain  Continue monitoring symptoms  Bowel regimen - Senna 2 tabs qhs prn    Encounter for Palliative Care  Patient more alert this am though not fully engaging in conversation  Updated daughter on current condition with drop in Hg.  Informed daughter of overall poor prognosis given father has been declining for the last 2 months in rehab and per oncology, new tx regimen not much beneficial  after progression with Zytiga  She states she will speak with her father- but wants to see how things go.  Encouraged her to discuss DNR directives as CPR will not have any significant benefit given his comorbid medical issues and advance age.   Emotional support    Diet changed to puree per patient preference

## 2023-03-29 NOTE — PROGRESS NOTE ADULT - ASSESSMENT
89y old male with a past medical history significant for prostate cancer with mets to the bone s/p radiation, colon cancer (remote >30 years) s/p resection and anemia who presents from rehab with generalized weakness which is ongoing as per pt's daughter, pt is mostly bed ridden. confusion ? at Rehab. in the ER Hb 5.7,   stool occult positive. tmax 102.5 in the ER, got zosyn. pt. will be admitted for drop in Hb.      Problem/Plan - 1:  Problem: Acute on chronic anemia    Plan: - likely multifactorial, no evidence of active GI bleed as per GI - FOBT positive   possible bone marrow involvement from prostate cancer and mets to bones.  s/p 2 units of prbc  will keep on iv protonix  follow cbc after 2 units, goal to keep Hb above 7 or close to 8.     Problem/Plan - 2:  ·  Problem: Systemic inflammatory response syndrome (SIRS) possibly malignancy related   ·  Plan: pt. had fever 102. 5 etiology ?  source   will keep on zosyn, follow all cultures.   consider CT abd if continues to be febrile      Problem/Plan - 3:  ·  Problem: metastatic Prostate cancer. stage IV  ·  Plan: s/p turp, radiation, bones to mets, likely contributing to pt's anemia   hem/ onc recs appreciate - recently enzalutamide commenced though usually not much beneficial after progression on Zytiga     Problem/Plan - 4:  ·  Problem: HTN (hypertension).   ·coreg 3.125 mg po bid with parameters.    Problem: 5   severe protein calorie malnutrition - anasarca   add ensure     Overall prognosis is guarded to poor. Appreciate palliative assistance with GOC discussions with family.

## 2023-03-30 LAB
ANION GAP SERPL CALC-SCNC: 14 MMOL/L — SIGNIFICANT CHANGE UP (ref 5–17)
BUN SERPL-MCNC: 25.3 MG/DL — HIGH (ref 8–20)
CALCIUM SERPL-MCNC: 7.6 MG/DL — LOW (ref 8.4–10.5)
CHLORIDE SERPL-SCNC: 106 MMOL/L — SIGNIFICANT CHANGE UP (ref 96–108)
CO2 SERPL-SCNC: 19 MMOL/L — LOW (ref 22–29)
CREAT SERPL-MCNC: 0.91 MG/DL — SIGNIFICANT CHANGE UP (ref 0.5–1.3)
EGFR: 80 ML/MIN/1.73M2 — SIGNIFICANT CHANGE UP
GLUCOSE SERPL-MCNC: 135 MG/DL — HIGH (ref 70–99)
HCT VFR BLD CALC: 25.4 % — LOW (ref 39–50)
HGB BLD-MCNC: 7.5 G/DL — LOW (ref 13–17)
MCHC RBC-ENTMCNC: 27.2 PG — SIGNIFICANT CHANGE UP (ref 27–34)
MCHC RBC-ENTMCNC: 29.5 GM/DL — LOW (ref 32–36)
MCV RBC AUTO: 92 FL — SIGNIFICANT CHANGE UP (ref 80–100)
NRBC # BLD: 6 /100 WBCS — HIGH (ref 0–0)
PLATELET # BLD AUTO: 102 K/UL — LOW (ref 150–400)
POTASSIUM SERPL-MCNC: 4.5 MMOL/L — SIGNIFICANT CHANGE UP (ref 3.5–5.3)
POTASSIUM SERPL-SCNC: 4.5 MMOL/L — SIGNIFICANT CHANGE UP (ref 3.5–5.3)
RBC # BLD: 2.76 M/UL — LOW (ref 4.2–5.8)
RBC # FLD: 18.6 % — HIGH (ref 10.3–14.5)
SODIUM SERPL-SCNC: 139 MMOL/L — SIGNIFICANT CHANGE UP (ref 135–145)
WBC # BLD: 5.07 K/UL — SIGNIFICANT CHANGE UP (ref 3.8–10.5)
WBC # FLD AUTO: 5.07 K/UL — SIGNIFICANT CHANGE UP (ref 3.8–10.5)

## 2023-03-30 PROCEDURE — 99233 SBSQ HOSP IP/OBS HIGH 50: CPT

## 2023-03-30 RX ADMIN — PIPERACILLIN AND TAZOBACTAM 25 GRAM(S): 4; .5 INJECTION, POWDER, LYOPHILIZED, FOR SOLUTION INTRAVENOUS at 06:04

## 2023-03-30 RX ADMIN — ATORVASTATIN CALCIUM 10 MILLIGRAM(S): 80 TABLET, FILM COATED ORAL at 23:18

## 2023-03-30 RX ADMIN — TAMSULOSIN HYDROCHLORIDE 0.4 MILLIGRAM(S): 0.4 CAPSULE ORAL at 23:19

## 2023-03-30 RX ADMIN — PIPERACILLIN AND TAZOBACTAM 25 GRAM(S): 4; .5 INJECTION, POWDER, LYOPHILIZED, FOR SOLUTION INTRAVENOUS at 14:20

## 2023-03-30 RX ADMIN — Medication 200 MILLIGRAM(S): at 23:18

## 2023-03-30 RX ADMIN — FENTANYL CITRATE 1 PATCH: 50 INJECTION INTRAVENOUS at 23:19

## 2023-03-30 RX ADMIN — OXYCODONE HYDROCHLORIDE 5 MILLIGRAM(S): 5 TABLET ORAL at 23:28

## 2023-03-30 RX ADMIN — PANTOPRAZOLE SODIUM 40 MILLIGRAM(S): 20 TABLET, DELAYED RELEASE ORAL at 18:50

## 2023-03-30 RX ADMIN — FENTANYL CITRATE 1 PATCH: 50 INJECTION INTRAVENOUS at 23:17

## 2023-03-30 RX ADMIN — FENTANYL CITRATE 1 PATCH: 50 INJECTION INTRAVENOUS at 09:50

## 2023-03-30 RX ADMIN — Medication 1 TABLET(S): at 11:34

## 2023-03-30 RX ADMIN — PANTOPRAZOLE SODIUM 40 MILLIGRAM(S): 20 TABLET, DELAYED RELEASE ORAL at 06:04

## 2023-03-30 RX ADMIN — OXYCODONE HYDROCHLORIDE 5 MILLIGRAM(S): 5 TABLET ORAL at 09:51

## 2023-03-30 RX ADMIN — FENTANYL CITRATE 1 PATCH: 50 INJECTION INTRAVENOUS at 19:14

## 2023-03-30 RX ADMIN — OXYCODONE HYDROCHLORIDE 5 MILLIGRAM(S): 5 TABLET ORAL at 10:21

## 2023-03-30 NOTE — DIETITIAN NUTRITION RISK NOTIFICATION - ADDITIONAL COMMENTS/DIETITIAN RECOMMENDATIONS
1) Rx vit C 500mg and Jose BID to facilitate wound healing  2) Encourage HBV protein sources  3) Monitor weights daily for trend/accuracy

## 2023-03-30 NOTE — DIETITIAN INITIAL EVALUATION ADULT - OTHER INFO
89y old male with a past medical history significant for prostate cancer with mets to the bone s/p radiation, colon cancer (remote >30 years) s/p resection and anemia who presents from rehab with generalized weakness which is ongoing as per pt's daughter, pt. is mostly bed ridden. confusion ? at Rehab. in the ER Hb 5.7, stool occult positive. T max in the .6. Noted to have edema of upper/lower extremities.   He was recently admitted to [facility] for anemia earlier this month. His last colonoscopy was in 2020 at [place] for questionable GI bleeding after eating beets, he was told it was negative and that he would never need a colonoscopy again. Palliative following.

## 2023-03-30 NOTE — DIETITIAN INITIAL EVALUATION ADULT - PERTINENT LABORATORY DATA
03-30    139  |  106  |  25.3<H>  ----------------------------<  135<H>  4.5   |  19.0<L>  |  0.91    Ca    7.6<L>      30 Mar 2023 06:14

## 2023-03-30 NOTE — SWALLOW BEDSIDE ASSESSMENT ADULT - ORAL PREPARATORY PHASE
Anterior munch chewing pattern Within functional limits Anterior munch chewing pattern, however subsided with rotary chew observed

## 2023-03-30 NOTE — PROGRESS NOTE ADULT - ASSESSMENT
89yr old man  Hx of  prostate cancer with mets to the bone s/p radiation, past hx of colon cancer, anemia admitted from rehab with significant anemia    Anemia   s/p PRBC transfusion  hg 7.5  monitor Hg  No reported active GI bleeding    Prostate Cancer with Mets  Informed daughter of oncology input.  She wants to see how things go    Fever  T max 100.5  cont IV abx  cxs - NGTD    Dysphagia  Nurse reports cough when drinking  SLP eval  rec puree with mild thickened liquids  Aspiration precautions    Cancer Related Pain  ISTOP reviewed -see Event note  Cont Fentanyl 12mcg   Tramadol 50mg q6hr PRN for severe pain  Noted Oxycodone 5mg PRN added  Continue monitoring symptoms  Bowel regimen - Senna 2 tabs qhs prn    Encounter for Palliative Care  Patient AOx3 but cannot give much detail about his disease and time at rehab.   Currently do no feel he has capacity to make complex decisions - Wife is the main surrogate.  Daughter informs me mother has medical issues herself and feels father will recover.   Daughter has been discussed father's overall prognosis, sharing oncology input.    She has been given home hospice information  Will need to follow up with wife and daughter

## 2023-03-30 NOTE — PROVIDER CONTACT NOTE (OTHER) - ASSESSMENT
Unable to advance catheter successfully. Two RN attempt by RN Norma HOWARD and Jose AMAYA. Patient has no complaints of pain or discomfort at this time. Patient states he "may feel the urge to pee but cant".

## 2023-03-30 NOTE — PROGRESS NOTE ADULT - ASSESSMENT
89y old male with a past medical history significant for prostate cancer with mets to the bone s/p radiation, colon cancer (remote >30 years) s/p resection and anemia who presents from rehab with generalized weakness which is ongoing as per pt's daughter, pt is mostly bed ridden. confusion? at Rehab. in the ER Hb 5.7,   stool occult positive. tmax 102.5 in the ER, got zosyn. pt. will be admitted for drop in Hb.      Problem/Plan - 1:  Problem: Acute on chronic anemia    Plan: - likely multifactorial, no evidence of active GI bleed as per GI - FOBT positive   possible bone marrow involvement from prostate cancer and mets to bones.  s/p 2 units of prbc - Hgb 7.5  will keep on iv protonix       Problem/Plan - 2:  ·  Problem: Systemic inflammatory response syndrome (SIRS) possibly malignancy related   ·  Plan: pt. had fever 102. 5 etiology ?  source   will keep on zosyn, all cultures - Neg however he has urinary retention - failed TOV - complete 5 days  then TOV once mobility improves   consider CT abd if continues to be febrile      Problem/Plan - 3:  ·  Problem: metastatic Prostate cancer. stage IV  ·  Plan: s/p turp, radiation, bones to mets, likely contributing to pt's anemia   hem/ onc recs appreciate - recently enzalutamide commenced though usually not much beneficial after progression on Zytiga     Problem/Plan - 4:  ·  Problem: HTN (hypertension).   ·coreg 3.125 mg po bid with parameters.    Problem: 5   severe protein calorie malnutrition - anasarca   add ensure     Overall prognosis is guarded to poor. Appreciate palliative assistance with GOC discussions with family. still remains Full code and Plan for LUIS once medically stable.

## 2023-03-30 NOTE — SWALLOW BEDSIDE ASSESSMENT ADULT - SWALLOW EVAL: DIAGNOSIS
Mild oral dysphagia as piecemeal deglutition observed across trials and anterior munch chewing pattern noted with soft/bite and easy to chew trials. Pharyngeal dysphagia suspected with thin liquids as a delayed cough was noted post oral intake. Pharyngeal phase unremarkable for mildly thick liquids, puree, soft/bite sized and easy to chew trials. Mild oral dysphagia as piecemeal deglutition suspected across trials and anterior munch chewing pattern noted with soft/bite and easy to chew trials. Pharyngeal dysphagia suspected with thin liquids as a delayed cough was noted post oral intake. Pharyngeal phase unremarkable for mildly thick liquids, puree, soft/bite sized and easy to chew trials.

## 2023-03-30 NOTE — SWALLOW BEDSIDE ASSESSMENT ADULT - COMMENTS
As per MD Note: "pt. is a 89y old male with a past medical history significant for prostate cancer with mets to the bone s/p radiation, colon cancer (remote >30 years) s/p resection and anemia who presents from rehab with generalized weakness which is ongoing as per pt's daughter, pt. is mostly bed ridden. confusion ? at Rehab. in the ER Hb 5.7,   stool occult positive. T max in the .6, got zosyn. The patient and the daughter deny any recent nausea, vomiting, abdominal pain, melena, diarrhea or rectal bleeding. no cp. no sob. noted to have edema of upper / lower extremities.   He was recently admitted to [facility] for anemia earlier this month.   His last colonoscopy was in 2020 at [place] for questionable GI bleeding after eating beets, he was told it was negative and that he would never need a colonoscopy again. pt. [place]."

## 2023-03-30 NOTE — PROGRESS NOTE ADULT - ASSESSMENT
89y old male with a past medical history significant for prostate cancer with mets to the bone s/p radiation, colon cancer (remote >30 years) s/p resection and anemia who presents from rehab with generalized weakness which is ongoing as per pt's daughter, pt. is mostly bed ridden.  Recurrent visits from rehab because of similar complaint/anemia.  His last colonoscopy was in 2020 at [place] for questionable GI bleeding, he was told it was negative and that he would never need a colonoscopy again.  Being followed up by Oncology at [facility] with Dr Ferrara, plan was to commence enzalutamide.  Patient is status post palliative RT to T8 vertebral body (5 fractions).  noted To have severe anemia upon presentation and fever.    Anemia and thrombocytopenia:  - cannot rule out GI blood loss  - could be involvement by prostate cancer of bone marrow for which bone marrow biopsy would be required but would hold off such w/u given the age (already not many options available for prostate cancer at this point)  -P.r.n. transfusion to keep hemoglobin more than 7    Stage IV prostate cancer:  -recently plan was to commence enzalutamide though usually not much beneficial after progression on Zytiga; was scheduled to start later this week but is now admitted.  -patient to keep appointment with oncologist upon discharge at [facility]    fever w/u in progress  on abx    agree with palliative care evaluation for GOC  given age and comorbidities if clinically doesnt improve, not a candidate for aggressive therapy

## 2023-03-30 NOTE — DIETITIAN INITIAL EVALUATION ADULT - PERTINENT MEDS FT
MEDICATIONS  (STANDING):  atorvastatin 10 milliGRAM(s) Oral at bedtime  carvedilol 3.125 milliGRAM(s) Oral every 12 hours  fentaNYL   Patch  12 MICROgram(s)/Hr 1 Patch Transdermal every 72 hours  multivitamin 1 Tablet(s) Oral daily  pantoprazole  Injectable 40 milliGRAM(s) IV Push Once  pantoprazole  Injectable 40 milliGRAM(s) IV Push every 12 hours  piperacillin/tazobactam IVPB.. 3.375 Gram(s) IV Intermittent every 8 hours  tamsulosin 0.4 milliGRAM(s) Oral at bedtime    MEDICATIONS  (PRN):  acetaminophen     Tablet .. 650 milliGRAM(s) Oral every 6 hours PRN Temp greater or equal to 38C (100.4F), Mild Pain (1 - 3), Moderate Pain (4 - 6)  guaiFENesin Oral Liquid (Sugar-Free) 200 milliGRAM(s) Oral every 6 hours PRN Cough  oxyCODONE    IR 5 milliGRAM(s) Oral every 6 hours PRN Severe Pain (7 - 10)  senna 2 Tablet(s) Oral at bedtime PRN Constipation  traMADol 50 milliGRAM(s) Oral every 6 hours PRN Moderate Pain (4 - 6)

## 2023-03-30 NOTE — DIETITIAN NUTRITION RISK NOTIFICATION - TREATMENT: THE FOLLOWING DIET HAS BEEN RECOMMENDED
Diet, Pureed:   Mildly Thick Liquids (MILDTHICKLIQS)  Supplement Feeding Modality:  Oral  Ensure Enlive Cans or Servings Per Day:  1       Frequency:  Three Times a day (03-30-23 @ 15:15) [Active]

## 2023-03-30 NOTE — DIETITIAN INITIAL EVALUATION ADULT - ORAL INTAKE PTA/DIET HISTORY
Interview conducted with Pts daughter at bedside, providing meal assistance. Pt completed ~50% of meal, drinking green Naked drink from home. UBW estiamted ~186lbs, unclear accuracy of admission weight 208lbs, RD bedscale weight 200lbs noted with 1+ generalized edema. Daughter is unsure of weight loss/history. Pt s/p SLP eval recommending puree/mild thick fluids, can upgrade to Easy to Chew per Pt preference though Pt prefers pureed 2/2 ease of mastication. Requesting Ensure in house, prefers chocolate. Encouraged prioritizing protein at meals and providing assistance as able.

## 2023-03-30 NOTE — DIETITIAN INITIAL EVALUATION ADULT - NS FNS DIET ORDER
Diet, Pureed:   Mildly Thick Liquids (MILDTHICKLIQS)  Supplement Feeding Modality:  Oral  Ensure Enlive Cans or Servings Per Day:  1       Frequency:  Three Times a day (03-30-23 @ 15:15)

## 2023-03-30 NOTE — DIETITIAN INITIAL EVALUATION ADULT - PROBLEM SELECTOR PLAN 3
s/p turp, radiation, bones to mets, likely contributing to pt's anemia   hem/ onc consult , [facility] [place].

## 2023-03-30 NOTE — PROVIDER CONTACT NOTE (OTHER) - SITUATION
Patient TOV initiated 3/29 patient unable to void at this time. Patient last bladder scan 542 per ultrasound. PA ordered straightcath x1.

## 2023-03-30 NOTE — SWALLOW BEDSIDE ASSESSMENT ADULT - SLP GENERAL OBSERVATIONS
Pt received alert with reduced cognition/confusion, pt's daughter present for end of evaluation. 0/10 pain pre/post.

## 2023-03-30 NOTE — DIETITIAN INITIAL EVALUATION ADULT - PROBLEM SELECTOR PLAN 1
- likely multifactorial, no evidence of active GI bleed as per GI   possible bone marrow involvement from prostate cancer and mets to bones.  Transfuse 2 units of prbc  will keep on iv protonix  follow cbc after 2 units, goal to keep Hb above7 or close to 8.

## 2023-03-31 DIAGNOSIS — T83.9XXA UNSPECIFIED COMPLICATION OF GENITOURINARY PROSTHETIC DEVICE, IMPLANT AND GRAFT, INITIAL ENCOUNTER: ICD-10-CM

## 2023-03-31 DIAGNOSIS — N32.0 BLADDER-NECK OBSTRUCTION: ICD-10-CM

## 2023-03-31 LAB
ANION GAP SERPL CALC-SCNC: 12 MMOL/L — SIGNIFICANT CHANGE UP (ref 5–17)
BLD GP AB SCN SERPL QL: SIGNIFICANT CHANGE UP
BUN SERPL-MCNC: 20.7 MG/DL — HIGH (ref 8–20)
CALCIUM SERPL-MCNC: 7.3 MG/DL — LOW (ref 8.4–10.5)
CHLORIDE SERPL-SCNC: 106 MMOL/L — SIGNIFICANT CHANGE UP (ref 96–108)
CO2 SERPL-SCNC: 20 MMOL/L — LOW (ref 22–29)
CREAT SERPL-MCNC: 0.81 MG/DL — SIGNIFICANT CHANGE UP (ref 0.5–1.3)
EGFR: 83 ML/MIN/1.73M2 — SIGNIFICANT CHANGE UP
GLUCOSE SERPL-MCNC: 144 MG/DL — HIGH (ref 70–99)
HCT VFR BLD CALC: 20.1 % — CRITICAL LOW (ref 39–50)
HCT VFR BLD CALC: 26.4 % — LOW (ref 39–50)
HGB BLD-MCNC: 6.3 G/DL — CRITICAL LOW (ref 13–17)
HGB BLD-MCNC: 8.5 G/DL — LOW (ref 13–17)
MCHC RBC-ENTMCNC: 27.9 PG — SIGNIFICANT CHANGE UP (ref 27–34)
MCHC RBC-ENTMCNC: 28.4 PG — SIGNIFICANT CHANGE UP (ref 27–34)
MCHC RBC-ENTMCNC: 31.3 GM/DL — LOW (ref 32–36)
MCHC RBC-ENTMCNC: 32.2 GM/DL — SIGNIFICANT CHANGE UP (ref 32–36)
MCV RBC AUTO: 88.3 FL — SIGNIFICANT CHANGE UP (ref 80–100)
MCV RBC AUTO: 88.9 FL — SIGNIFICANT CHANGE UP (ref 80–100)
NRBC # BLD: 6 /100 WBCS — HIGH (ref 0–0)
NRBC # BLD: 7 /100 WBCS — HIGH (ref 0–0)
PLATELET # BLD AUTO: 102 K/UL — LOW (ref 150–400)
PLATELET # BLD AUTO: 91 K/UL — LOW (ref 150–400)
POTASSIUM SERPL-MCNC: 4.5 MMOL/L — SIGNIFICANT CHANGE UP (ref 3.5–5.3)
POTASSIUM SERPL-SCNC: 4.5 MMOL/L — SIGNIFICANT CHANGE UP (ref 3.5–5.3)
RBC # BLD: 2.26 M/UL — LOW (ref 4.2–5.8)
RBC # BLD: 2.99 M/UL — LOW (ref 4.2–5.8)
RBC # FLD: 16.3 % — HIGH (ref 10.3–14.5)
RBC # FLD: 18.4 % — HIGH (ref 10.3–14.5)
SODIUM SERPL-SCNC: 138 MMOL/L — SIGNIFICANT CHANGE UP (ref 135–145)
WBC # BLD: 5.17 K/UL — SIGNIFICANT CHANGE UP (ref 3.8–10.5)
WBC # BLD: 5.48 K/UL — SIGNIFICANT CHANGE UP (ref 3.8–10.5)
WBC # FLD AUTO: 5.17 K/UL — SIGNIFICANT CHANGE UP (ref 3.8–10.5)
WBC # FLD AUTO: 5.48 K/UL — SIGNIFICANT CHANGE UP (ref 3.8–10.5)

## 2023-03-31 PROCEDURE — 99223 1ST HOSP IP/OBS HIGH 75: CPT

## 2023-03-31 PROCEDURE — 99233 SBSQ HOSP IP/OBS HIGH 50: CPT

## 2023-03-31 PROCEDURE — 51703 INSERT BLADDER CATH COMPLEX: CPT

## 2023-03-31 PROCEDURE — 74174 CTA ABD&PLVS W/CONTRAST: CPT | Mod: 26

## 2023-03-31 RX ORDER — ACETAMINOPHEN 500 MG
975 TABLET ORAL EVERY 8 HOURS
Refills: 0 | Status: DISCONTINUED | OUTPATIENT
Start: 2023-03-31 | End: 2023-04-03

## 2023-03-31 RX ADMIN — PIPERACILLIN AND TAZOBACTAM 25 GRAM(S): 4; .5 INJECTION, POWDER, LYOPHILIZED, FOR SOLUTION INTRAVENOUS at 13:00

## 2023-03-31 RX ADMIN — Medication 975 MILLIGRAM(S): at 22:18

## 2023-03-31 RX ADMIN — FENTANYL CITRATE 1 PATCH: 50 INJECTION INTRAVENOUS at 07:00

## 2023-03-31 RX ADMIN — ATORVASTATIN CALCIUM 10 MILLIGRAM(S): 80 TABLET, FILM COATED ORAL at 22:18

## 2023-03-31 RX ADMIN — OXYCODONE HYDROCHLORIDE 5 MILLIGRAM(S): 5 TABLET ORAL at 12:24

## 2023-03-31 RX ADMIN — OXYCODONE HYDROCHLORIDE 5 MILLIGRAM(S): 5 TABLET ORAL at 13:24

## 2023-03-31 RX ADMIN — Medication 200 MILLIGRAM(S): at 22:19

## 2023-03-31 RX ADMIN — FENTANYL CITRATE 1 PATCH: 50 INJECTION INTRAVENOUS at 20:58

## 2023-03-31 RX ADMIN — PANTOPRAZOLE SODIUM 40 MILLIGRAM(S): 20 TABLET, DELAYED RELEASE ORAL at 17:09

## 2023-03-31 RX ADMIN — CARVEDILOL PHOSPHATE 3.12 MILLIGRAM(S): 80 CAPSULE, EXTENDED RELEASE ORAL at 05:43

## 2023-03-31 RX ADMIN — PANTOPRAZOLE SODIUM 40 MILLIGRAM(S): 20 TABLET, DELAYED RELEASE ORAL at 05:42

## 2023-03-31 RX ADMIN — PIPERACILLIN AND TAZOBACTAM 25 GRAM(S): 4; .5 INJECTION, POWDER, LYOPHILIZED, FOR SOLUTION INTRAVENOUS at 22:18

## 2023-03-31 RX ADMIN — PIPERACILLIN AND TAZOBACTAM 25 GRAM(S): 4; .5 INJECTION, POWDER, LYOPHILIZED, FOR SOLUTION INTRAVENOUS at 05:43

## 2023-03-31 RX ADMIN — Medication 1 TABLET(S): at 11:29

## 2023-03-31 RX ADMIN — Medication 975 MILLIGRAM(S): at 23:18

## 2023-03-31 RX ADMIN — TAMSULOSIN HYDROCHLORIDE 0.4 MILLIGRAM(S): 0.4 CAPSULE ORAL at 22:18

## 2023-03-31 NOTE — PROGRESS NOTE ADULT - ASSESSMENT
89y old male with a past medical history significant for prostate cancer with mets to the bone, liver,  s/p radiation, colon cancer (remote >30 years) s/p resection and anemia who presents from rehab with generalized weakness which is ongoing as per pt's daughter, pt is mostly bed ridden. confusion? at Rehab. in the ER Hb 5.7,   stool occult positive. tmax 102.5 in the ER, got zosyn. pt. was admitted for further work up of anemia and sepsis.      #Acute on chronic anemia most likely ICD, however can't rule out occult GI losses  - h/h noted  - 2 more prbc ordered, will obtain CT angio abd/pelvis to assess for extravasation and RP  - c/w iv ppi  - cbc later today after transfusion  - Iron panel, folate, b12 panel noted, GI consult noted    # SIRS with unclear source of infection at this time  - set of urine and blood cx wgtd   - c/w zosyn d4/?  - CT abd/pelvis as above to get more infor in regards deep tissues infection in sacrum area given pressure injury   - will consult ID    # Metabolic Encephalopathy, has improved  - multifactoria due to above  - management as above    # Urinary retention in pt with hx of mts prostate cancer   Segura reincerted on 03/31  Hem/onc consult noted, not a candidate for tx at this time  palliative team follows, recs noted    #HTN (hypertension), CAD  - d/c coreg 3.125 mg po bid given bradycardia     #severe protein calorie malnutrition  with anasarca   - most likely due to underlining advance prostate cancer   - nutrition consult noted      #DVT ppx - SCD for now given anemia  #Code/social - full code, called wife and daughter, can't leave voice message  # Dispo - medically acute, poor prognosis     Overall prognosis is guarded to poor. Appreciate palliative assistance with GOC discussions with family. still remains Full code and Plan for LUIS once medically stable.

## 2023-03-31 NOTE — PROGRESS NOTE ADULT - ASSESSMENT
89y old male with a past medical history significant for prostate cancer with mets to the bone s/p radiation, colon cancer (remote >30 years) s/p resection and anemia who presents from rehab with generalized weakness which is ongoing as per pt's daughter, pt. is mostly bed ridden.  Recurrent visits from rehab because of similar complaint/anemia.  His last colonoscopy was in 2020 at [place] for questionable GI bleeding, he was told it was negative and that he would never need a colonoscopy again.  Being followed up by Oncology at [facility] with Dr Ferrara, plan was to commence enzalutamide.  Patient is status post palliative RT to T8 vertebral body (5 fractions).  noted To have severe anemia upon presentation and fever.    Anemia and thrombocytopenia:  - cannot rule out GI blood loss  - could be involvement by prostate cancer of bone marrow for which bone marrow biopsy would be required but would hold off such w/u given the age (already not many options available for prostate cancer at this point)  -P.r.n. transfusion to keep hemoglobin more than 7  Hgb 6.2 this am - transfuse 1u PRBC    Stage IV prostate cancer:  -recently plan was to commence enzalutamide though usually not much beneficial after progression on Zytiga; was scheduled to start later this week but is now admitted.  - overall prognosis poor and not candidate for aggressive therapy    fever w/u in progress  on abx    agree with palliative care evaluation for GOC  given age and comorbidities if clinically doesnt improve, not a candidate for aggressive therapy

## 2023-03-31 NOTE — ADVANCED PRACTICE NURSE CONSULT - RECOMMEDATIONS
·	Cleanse with normal saline, Pat dry, paint periwound with Cavilon liquid skin barrier, cover wound with Xeroform nonadherent dressing, Allevyn instead (DO NOT PUT ALLEVYN DIRECTLY ONTO OPEN SKIN). - change daily or PRN with soiling     ·	Turn and Reposition Q2-4H  ·	Offload sacral-coccygeal area with positioner pillow, alternate sides Q2-4H  ·	Incontinence care with repositioning Q2-4H  ·	Apply Heel-Offloading cAIRboots or vertical pillows under each leg at all times while in bed. - provide skin checks and foot placement Q8H    -Continue turning/positioning patient from side-to-side q2h while in bed, q1h when/if OOB chair, or in accordance w/ pt's plan of care. Utilize pillows and/or Spry positioner pillow to assist w/ turning/positioning. When/if OOB chair, utilize pillows or chair cushion to offload pressure.   -Continue to offload heels from bed surface with soft pillow under calfs or by applying offloading boots to BLEs.   -Continue applying Coloplast Santa Protect moisture barrier cream to buttock and perineal area daily and prn after each incontinent episode.    -Continue utilizing one underpad underneath patient to contain incontinence episodes; change pad when saturated/soiled.   -Consider utilizing condom catheter (if patient candidate) to contain any urinary incontinence.   -Consider utilizing external fecal  (if patient candidate) or fecal management system/rectal tube/DigniShield (if patient candidate; MD order required) to contain loose fecal incontinence.   -Continue nutrition consult for optimal wound healing & nutritional status.   -Assess skin/wound qshift, report changes to primary provider.     Plan of Care: Primary RN made aware of above recs. Spoke w/ provider in regards to above. No further needs/recs from [facility] service at this time. Staff RN to perform routine skin/wound assessment and manage wound care. Questions or concerns or if wound worsens and reconsult needed, please contact [facility]   ·	COCCYX & BUTTOCK WOUNDS - Cleanse with normal saline, Pat dry, paint periwound with Cavilon liquid skin barrier, cover wound with Xeroform nonadherent dressing, Allevyn instead (DO NOT PUT ALLEVYN DIRECTLY ONTO OPEN SKIN). - change daily or PRN with soiling   ·	FOREHEAD - cleanse with NS, pat dry, Cavilon ADVANCED - apply MWF  ·	Medial Thigh - Cavilon to blister, cover with ABD pad and tape - daily     ·	Turn and Reposition Q2-4H  ·	Offload sacral-coccygeal area with positioner pillow, alternate sides Q2-4H  ·	Incontinence care with repositioning Q2-4H  ·	Apply Heel-Offloading cAIRboots or vertical pillows under each leg at all times while in bed. - provide skin checks and foot placement Q8H    -Continue turning/positioning patient from side-to-side q2h while in bed, q1h when/if OOB chair, or in accordance w/ pt's plan of care. Utilize pillows and/or Spry positioner pillow to assist w/ turning/positioning. When/if OOB chair, utilize pillows or chair cushion to offload pressure.   -Continue to offload heels from bed surface with soft pillow under calfs or by applying offloading boots to BLEs.   -Continue applying Coloplast Santa Protect moisture barrier cream to buttock and perineal area daily and prn after each incontinent episode.    -Continue utilizing one underpad underneath patient to contain incontinence episodes; change pad when saturated/soiled.   -Consider utilizing condom catheter (if patient candidate) to contain any urinary incontinence.   -Consider utilizing external fecal  (if patient candidate) or fecal management system/rectal tube/DigniShield (if patient candidate; MD order required) to contain loose fecal incontinence.   -Continue nutrition consult for optimal wound healing & nutritional status.   -Assess skin/wound qshift, report changes to primary provider.     Plan of Care: Primary RN made aware of above recs. Spoke w/ provider in regards to above. No further needs/recs from [facility] service at this time. Staff RN to perform routine skin/wound assessment and manage wound care. Questions or concerns or if wound worsens and reconsult needed, please contact [facility]

## 2023-03-31 NOTE — PROCEDURE NOTE - ADDITIONAL PROCEDURE DETAILS
US Guided 18g IV placed in right upper arm. +flash, flushes easily. Patient tolerated procedure well with no immediate complications. Tourniquet removed, all sharps disposed of appropriately.

## 2023-03-31 NOTE — PROCEDURE NOTE - NSPROCNAME_GEN_A_CORE
Urinary Device Placement
Peripheral Line Insertion
Peripheral Line Insertion
Urinary Device Placement

## 2023-03-31 NOTE — PROCEDURE NOTE - NSURITECHNIQUE_GU_A_CORE
Proper hand hygiene was performed/Sterile gloves were worn for the duration of the procedure/A sterile drape was used to cover all adjacent areas/The site was cleaned with soap/water and sterile solution (betadine)/The catheter was appropriately lubricated/The catheter was secured with a securement device (e.g. StatLock)/The urinary drainage system is closed at the end of the procedure/The collection bag is below the level of the patient and urinary bladder/All applicable medical record documentation is completed
Proper hand hygiene was performed/Sterile gloves were worn for the duration of the procedure/A sterile drape was used to cover all adjacent areas/The site was cleaned with soap/water and sterile solution (betadine)/The catheter was appropriately lubricated/All applicable medical record documentation is completed

## 2023-03-31 NOTE — PROCEDURE NOTE - NSINDICATIONS_GEN_A_CORE
bladder scan with 408cc noted/urinry obstruction or retention
Blood
altered anatomy/history of difficult urethral catheterization
antibiotic therapy

## 2023-03-31 NOTE — CONSULT NOTE ADULT - SUBJECTIVE AND OBJECTIVE BOX
INFECTIOUS DISEASES AND INTERNAL MEDICINE at [place]  =======================================================  Roberto Gaston MD  Diplomates American Board of Internal Medicine and Infectious Diseases  Telephone [phone]  Fax            [phone]  =======================================================    VIANEY [id number]      HPI:  PT. is a 89y old male with a past medical history significant for prostate cancer with mets to the bone s/p radiation, colon cancer (remote >30 years) s/p resection and anemia who presents from rehab with generalized weakness which is ongoing as per pt's daughter, pt. is mostly bed ridden. confusion ? at Rehab. in the ER Hb 5.7,   stool occult positive. T max in the .6, got zosyn. The patient and the daughter deny any recent nausea, vomiting, abdominal pain, melena, diarrhea or rectal bleeding. no cp. no sob. noted to have edema of upper / lower extremities.   He was recently admitted to [facility] for anemia earlier this month.   His last colonoscopy was in 2020 at [place] for questionable GI bleeding after eating beets, he was told it was negative and that he would never need a colonoscopy again. pt[place].   AS ABOVE PT ADMITTED WITH ANEMIA WITH FEVERS PLACED IN IV ABX  ASKED TO EVALUATE FROM ID STANDPOINT       PAST MEDICAL & SURGICAL HISTORY:  Hypertension      Irregular heart rate      Pacemaker  [employer]      Bradycardia      Glaucoma      Prostate cancer      Colon cancer      S/P colectomy  1992      H/O total knee replacement, left      Pacemaker  [employer]      S/P TURP      S/P cataract surgery      History of esophageal surgery          ANTIBIOTICS  piperacillin/tazobactam IVPB.. 3.375 Gram(s) IV Intermittent every 8 hours      Allergies    No Known Allergies    Intolerances        SOCIAL HISTORY:     FAMILY HX   FAMILY HISTORY:  Family hx of colon cancer    FH: throat cancer (Sibling)        Vital Signs Last 24 Hrs  T(C): 36.7 (31 Mar 2023 16:23), Max: 37.3 (31 Mar 2023 08:30)  T(F): 98 (31 Mar 2023 16:23), Max: 99.1 (31 Mar 2023 08:30)  HR: 74 (31 Mar 2023 16:23) (50 - 74)  BP: 156/73 (31 Mar 2023 16:23) (141/56 - 156/73)  BP(mean): --  RR: 18 (31 Mar 2023 16:23) (18 - 18)  SpO2: 96% (31 Mar 2023 16:23) (94% - 97%)    Parameters below as of 31 Mar 2023 16:23  Patient On (Oxygen Delivery Method): room air      Drug Dosing Weight  Height (cm): 175.3 (28 Mar 2023 08:00)  Weight (kg): 94.3 (28 Mar 2023 08:00)  BMI (kg/m2): 30.7 (28 Mar 2023 08:00)  BSA (m2): 2.1 (28 Mar 2023 08:00)      REVIEW OF SYSTEMS:    POOR HISTORIAN UNABLE TO OBTAIN                PHYSICAL EXAMINATION:    GENERAL: The patient is a _____in no apparent distress. ___     VITAL SIGNS: T(C): 36.7 (03-31-23 @ 16:23), Max: 37.3 (03-31-23 @ 08:30)  HR: 74 (03-31-23 @ 16:23) (50 - 74)  BP: 156/73 (03-31-23 @ 16:23) (141/56 - 156/73)  RR: 18 (03-31-23 @ 16:23) (18 - 18)  SpO2: 96% (03-31-23 @ 16:23) (94% - 97%)  Wt(kg): --    HEENT: Head is normocephalic and atraumatic.  ANICTERIC  NECK: Supple. No carotid bruits.  No lymphadenopathy or thyromegaly.    LUNGS:COARSE BREATH SOUNDS    HEART: Regular rate and rhythm without murmur.    ABDOMEN: Soft, nontender, and nondistended.  Positive bowel sounds.  No hepatosplenomegaly was noted. NO REBOUND NO GUARDING    EXTREMITIES:   EDEMA      NEUROLOGIC: NON FOCAL ANSWERS SIMPLE QUESTIONS       SKIN: No ulceration or induration present. NO RASH        BLOOD CULTURES  Culture Results:   <10,000 CFU/mL Normal Urogenital Cathy (03-27 @ 13:25)  Culture Results:   No growth to date. (03-27 @ 11:36)  Culture Results:   No growth to date. (03-27 @ 11:29)       URINE CX          LABS:                        6.3    5.17  )-----------( 102      ( 31 Mar 2023 06:25 )             20.1     03-31    138  |  106  |  20.7<H>  ----------------------------<  144<H>  4.5   |  20.0<L>  |  0.81    Ca    7.3<L>      31 Mar 2023 06:25            RADIOLOGY & ADDITIONAL STUDIES:      ASSESSMENT/PLAN  PT. is a 89y old male with a past medical history significant for prostate cancer with mets to the bone s/p radiation, colon cancer (remote >30 years) s/p resection and anemia who presents from rehab with generalized weakness which is ongoing as per pt's daughter, pt. is mostly bed ridden. confusion ? at Rehab. in the ER Hb 5.7,   stool occult positive. T max in the .6, got zosyn. The patient and the daughter deny any recent nausea, vomiting, abdominal pain, melena, diarrhea or rectal bleeding. no cp. no sob. noted to have edema of upper / lower extremities.   He was recently admitted to [facility] for anemia earlier this month.   His last colonoscopy was in 2020 at [place] for questionable GI bleeding after eating beets, he was told it was negative and that he would never need a colonoscopy again. pt[facility] [place].   AS ABOVE PT ADMITTED WITH ANEMIA WITH FEVERS PLACED IIV ABX  UNCLEAR  SOURCE OF FEVERS BLOOD CX NEG URINE CX NO SIG GROWTH  CT SCAN WITH HEMATOMA BUT NO ABSCESS  CONTINUE EMPIRIC IV ABX  WILL FOLLOWUP WITH FURTHER RECOMMENDATIONS                JANENE MEDINA MD

## 2023-03-31 NOTE — PROCEDURE NOTE - NSPOSTCAREGUIDE_GEN_A_CORE
Verbal/written post procedure instructions were given to patient/caregiver/Instructed patient/caregiver to follow-up with primary care physician/Instructed patient/caregiver regarding signs and symptoms of infection/Keep the cast/splint/dressing clean and dry/Care for catheter as per unit/ICU protocols
Verbal/written post procedure instructions were given to patient/caregiver

## 2023-03-31 NOTE — ADVANCED PRACTICE NURSE CONSULT - ASSESSMENT
Patient laying supine in bed, turned to left (pillow under side), HOB elevated 30 degrees. Pt awake, A&Ox3, made aware of purpose of WOCN visit, agreeable to consult.  Patient with limited mobility in bed. Documented history of fecal and urinary incontinence.  Ordered for pureed thick liquid diet, current Sloan score of 12. With assistance, patient turned patient to side for skin assessment. Skin assessment as below:    ·	Right Buttock extending into Coccyx (6.5x6cm) - deep red moist wound bed with white irregular well-defined edges with peeling epidermal layer, scant serous drainage.  Periwound skin clean moist intact with boggy skin as compared to adjacent skin with evolving blistering and fissure formation. Patient laying supine in bed, turned to left (pillow under side), HOB elevated 30 degrees. Pt awake, A&Ox3, made aware of purpose of WOCN visit, agreeable to consult.  Patient with limited mobility in bed. Documented history of fecal and urinary incontinence.  Ordered for pureed thick liquid diet, current Sloan score of 12. With assistance, patient turned patient to side for skin assessment. Skin assessment as below:    ·	Right Buttock extending into Coccyx (6.5x6cm) - deep red moist wound bed with white irregular well-defined edges with peeling epidermal layer, scant serous drainage.  Periwound skin clean moist intact with boggy skin as compared to adjacent skin with evolving blistering and fissure formation.    ·	Forehead - Stage 2 Pressure Injury ( 0.5x0.5 cm) - red/pink moist wound bed with well-defined edges, moderate serosanguinous drainage.  Periwound clean dry intact    ·	Right Medial Thigh - Draining Serum Filled Blister or Stage 2 (2x2cm) - fragile partially intact draining blister with yellow clear liquid no redness, periwound clean dry intact

## 2023-03-31 NOTE — PROCEDURE NOTE - NSICDXPROBLEMMLMBOX_GEN
IPSTART~^~1~^~[id number]~^~~^~IPEND  PKSTART~^~[id number]~^~PKEND  IPSTART~^~2~^~[id number]~^~~^~IPEND  PKSTART~^~[id number]~^~PKEND

## 2023-03-31 NOTE — PROCEDURE NOTE - ADDITIONAL PROCEDURE DETAILS
Called to evaluate patient for peripheral access   20G Peripheral IV placed in right forearm using ultrasound, on 3rd attempt, good flow, flushes well, can be accessed.

## 2023-03-31 NOTE — PROCEDURE NOTE - NSPROCDETAILS_GEN_ALL_CORE
location identified, draped/prepped, sterile technique used/blood seen on insertion/dressing applied/flushes easily/secured in place/sterile technique, catheter placed
blood seen on insertion/dressing applied/flushes easily/secured in place
sterile technique, indwelling urinary device inserted
sterile technique, non-indwelling urinary device inserted

## 2023-03-31 NOTE — PROGRESS NOTE ADULT - ASSESSMENT
89yr old man  Hx of  prostate cancer with mets to the bone s/p radiation, past hx of colon cancer, anemia admitted from rehab with significant anemia    Anemia   drop in Hg  PRBC ordered for transfuions  monitor Hg  No reported active GI bleeding    Prostate Cancer with Mets  Oncology input noted- "overall prognosis poor and not candidate for aggressive therapy"    Fever  low grade temps  cont IV abx  cxs - NGTD    Dysphagia  SLP eval  rec puree with mild thickened liquids  Aspiration precautions    Cancer Related Pain  ISTOP reviewed -see Event note  Cont Fentanyl 12mcg   Tramadol 50mg q6hr PRN for severe pain  Noted Oxycodone 5mg PRN added  Continue monitoring symptoms  Bowel regimen - Senna 2 tabs qhs prn    Encounter for Palliative Care 89yr old man  Hx of  prostate cancer with mets to the bone s/p radiation, past hx of colon cancer, anemia admitted from rehab with significant anemia    Anemia   drop in Hg  PRBC ordered for transfuions  monitor Hg  No reported active GI bleeding    Prostate Cancer with Mets  Oncology input noted- "overall prognosis poor and not candidate for aggressive therapy"    Fever  low grade temps  cont IV abx  cxs - NGTD    Dysphagia  SLP eval  rec puree with mild thickened liquids  Aspiration precautions    Cancer Related Pain  ISTOP reviewed -see Event note  Cont Fentanyl 12mcg   Tramadol 50mg q6hr PRN for severe pain  Noted Oxycodone 5mg PRN added  Continue monitoring symptoms  Bowel regimen - Senna 2 tabs qhs prn    Encounter for Palliative Care  Patient anemic again.  Noted Oncology input  Daughter has been informed about hospice services.  Advanced directives discussed.  See GOC note   She reports mother does not like coming to hospital 2/2 her own medical issues but feels patient will recover  Recommend family meeting 3/28/23 89yr old man  Hx of  prostate cancer with mets to the bone s/p radiation, past hx of colon cancer, anemia admitted from rehab with significant anemia    Anemia   drop in Hg  PRBC ordered for transfusion  monitor Hg  Per No reported active bleeding    Prostate Cancer with Mets  Oncology input noted- "overall prognosis poor and not candidate for aggressive therapy"    Fever  low grade temps  cont IV abx  cxs - NGTD    Dysphagia  SLP eval  rec puree with mild thickened liquids  Aspiration precautions    Cancer Related Pain  ISTOP reviewed -see Event note  Cont Fentanyl 12mcg   Tramadol 50mg q6hr PRN for severe pain  Noted Oxycodone 5mg PRN added  Continue monitoring symptoms  Bowel regimen - Senna 2 tabs qhs prn    Encounter for Palliative Care  Patient anemic again. Per RN - no reported bleeding  Noted Oncology input - "overall prognosis poor and not candidate for aggressive therapy"  Daughter has been informed about hospice services.  Advanced directives discussed.  See GOC note  3/28/21  She reports mother  feels patient will recover    Spoke to daughter Bharti today and informed her of Oncology input.   Recommend to discuss with mother - consider hospice services.  Psychosocial support

## 2023-03-31 NOTE — PROCEDURE NOTE - ADDITIONAL PROCEDURE DETAILS
Patient on trial of void since 03/29/2023, called for resistance on straight Patient on trial of void since 03/29/2023, called for resistance on straight cath by RN and Patient on trial of void since 03/29/2023, bladder scanned today with 408cc noted. RN with attempts of straight cath, but called for resistance on straight cath by RN and nursing manager.   Patient was seen at bedside with FRANKIE Pederson and myself. FRANKIE nettles'ed patient without any complications.   sterile technique used, 15fr. straight cath, able to obtain 600cc urine, yellow-clear, no hematuria noted. straight cath removed  RN aware to bladder scan patient as per order   Will continue to monitor and will sign-out to daytime medicine team.   RN to notify provider of any changes in patient status.

## 2023-03-31 NOTE — PROCEDURE NOTE - ADDITIONAL PROCEDURE DETAILS
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

## 2023-04-01 LAB
ANION GAP SERPL CALC-SCNC: 11 MMOL/L — SIGNIFICANT CHANGE UP (ref 5–17)
BUN SERPL-MCNC: 17.7 MG/DL — SIGNIFICANT CHANGE UP (ref 8–20)
CALCIUM SERPL-MCNC: 7.5 MG/DL — LOW (ref 8.4–10.5)
CHLORIDE SERPL-SCNC: 105 MMOL/L — SIGNIFICANT CHANGE UP (ref 96–108)
CO2 SERPL-SCNC: 20 MMOL/L — LOW (ref 22–29)
CREAT SERPL-MCNC: 0.85 MG/DL — SIGNIFICANT CHANGE UP (ref 0.5–1.3)
CULTURE RESULTS: SIGNIFICANT CHANGE UP
CULTURE RESULTS: SIGNIFICANT CHANGE UP
EGFR: 82 ML/MIN/1.73M2 — SIGNIFICANT CHANGE UP
GLUCOSE SERPL-MCNC: 113 MG/DL — HIGH (ref 70–99)
HCT VFR BLD CALC: 25.3 % — LOW (ref 39–50)
HGB BLD-MCNC: 8 G/DL — LOW (ref 13–17)
MCHC RBC-ENTMCNC: 27.9 PG — SIGNIFICANT CHANGE UP (ref 27–34)
MCHC RBC-ENTMCNC: 31.6 GM/DL — LOW (ref 32–36)
MCV RBC AUTO: 88.2 FL — SIGNIFICANT CHANGE UP (ref 80–100)
NRBC # BLD: 5 /100 WBCS — HIGH (ref 0–0)
PLATELET # BLD AUTO: 90 K/UL — LOW (ref 150–400)
POTASSIUM SERPL-MCNC: 4.2 MMOL/L — SIGNIFICANT CHANGE UP (ref 3.5–5.3)
POTASSIUM SERPL-SCNC: 4.2 MMOL/L — SIGNIFICANT CHANGE UP (ref 3.5–5.3)
RBC # BLD: 2.87 M/UL — LOW (ref 4.2–5.8)
RBC # FLD: 16.7 % — HIGH (ref 10.3–14.5)
SODIUM SERPL-SCNC: 136 MMOL/L — SIGNIFICANT CHANGE UP (ref 135–145)
SPECIMEN SOURCE: SIGNIFICANT CHANGE UP
SPECIMEN SOURCE: SIGNIFICANT CHANGE UP
WBC # BLD: 4.89 K/UL — SIGNIFICANT CHANGE UP (ref 3.8–10.5)
WBC # FLD AUTO: 4.89 K/UL — SIGNIFICANT CHANGE UP (ref 3.8–10.5)

## 2023-04-01 PROCEDURE — 99233 SBSQ HOSP IP/OBS HIGH 50: CPT

## 2023-04-01 PROCEDURE — 72192 CT PELVIS W/O DYE: CPT | Mod: 26

## 2023-04-01 RX ADMIN — Medication 975 MILLIGRAM(S): at 22:51

## 2023-04-01 RX ADMIN — Medication 975 MILLIGRAM(S): at 17:02

## 2023-04-01 RX ADMIN — Medication 975 MILLIGRAM(S): at 05:31

## 2023-04-01 RX ADMIN — PANTOPRAZOLE SODIUM 40 MILLIGRAM(S): 20 TABLET, DELAYED RELEASE ORAL at 17:07

## 2023-04-01 RX ADMIN — PIPERACILLIN AND TAZOBACTAM 25 GRAM(S): 4; .5 INJECTION, POWDER, LYOPHILIZED, FOR SOLUTION INTRAVENOUS at 13:53

## 2023-04-01 RX ADMIN — ATORVASTATIN CALCIUM 10 MILLIGRAM(S): 80 TABLET, FILM COATED ORAL at 22:50

## 2023-04-01 RX ADMIN — Medication 975 MILLIGRAM(S): at 06:31

## 2023-04-01 RX ADMIN — TAMSULOSIN HYDROCHLORIDE 0.4 MILLIGRAM(S): 0.4 CAPSULE ORAL at 22:51

## 2023-04-01 RX ADMIN — Medication 975 MILLIGRAM(S): at 13:52

## 2023-04-01 RX ADMIN — PIPERACILLIN AND TAZOBACTAM 25 GRAM(S): 4; .5 INJECTION, POWDER, LYOPHILIZED, FOR SOLUTION INTRAVENOUS at 22:50

## 2023-04-01 RX ADMIN — SENNA PLUS 2 TABLET(S): 8.6 TABLET ORAL at 22:50

## 2023-04-01 RX ADMIN — Medication 1 TABLET(S): at 13:52

## 2023-04-01 RX ADMIN — Medication 975 MILLIGRAM(S): at 23:47

## 2023-04-01 RX ADMIN — PIPERACILLIN AND TAZOBACTAM 25 GRAM(S): 4; .5 INJECTION, POWDER, LYOPHILIZED, FOR SOLUTION INTRAVENOUS at 05:31

## 2023-04-01 RX ADMIN — PANTOPRAZOLE SODIUM 40 MILLIGRAM(S): 20 TABLET, DELAYED RELEASE ORAL at 05:31

## 2023-04-01 NOTE — PROGRESS NOTE ADULT - ASSESSMENT
89y old male with a past medical history significant for prostate cancer with mets to the bone, liver,  s/p radiation, colon cancer (remote >30 years) s/p resection and anemia who presents from rehab with generalized weakness which is ongoing as per pt's daughter, pt is mostly bed ridden. confusion? at Rehab. in the ER Hb 5.7,   stool occult positive. tmax 102.5 in the ER, got zosyn. pt. was admitted for further work up of anemia and sepsis.      Acute on chronic anemia most likely ICD, however can't rule out occult GI losses  - Monitor CBC  - s/p PRBC transfusion  - CTA A/P noted    SIRS with unclear source of infection at this time  - Blood and urine culture negative  - Continue Zosyn  - CT abd/pelvis as above to get more infor in regards deep tissues infection in sacrum area given pressure injury   - ID consult appreciated    Metabolic Encephalopathy, has improved  - multifactoria due to above  - management as above    Urinary retention in pt with hx of mts prostate cancer   - Segura reinserted on 03/31  - Hem/onc consult noted, not a candidate for tx at this time  - palliative team follows, recs noted    HTN (hypertension), CAD  - d/c coreg 3.125 mg po bid given bradycardia     Severe protein calorie malnutrition  with anasarca   - most likely due to underlining advance prostate cancer   - nutrition consult noted    DVT ppx  - SCD

## 2023-04-02 LAB
ANION GAP SERPL CALC-SCNC: 13 MMOL/L — SIGNIFICANT CHANGE UP (ref 5–17)
ANISOCYTOSIS BLD QL: SLIGHT — SIGNIFICANT CHANGE UP
BASOPHILS # BLD AUTO: 0 K/UL — SIGNIFICANT CHANGE UP (ref 0–0.2)
BASOPHILS NFR BLD AUTO: 0 % — SIGNIFICANT CHANGE UP (ref 0–2)
BUN SERPL-MCNC: 14.6 MG/DL — SIGNIFICANT CHANGE UP (ref 8–20)
CALCIUM SERPL-MCNC: 7.5 MG/DL — LOW (ref 8.4–10.5)
CHLORIDE SERPL-SCNC: 105 MMOL/L — SIGNIFICANT CHANGE UP (ref 96–108)
CO2 SERPL-SCNC: 19 MMOL/L — LOW (ref 22–29)
CREAT SERPL-MCNC: 0.81 MG/DL — SIGNIFICANT CHANGE UP (ref 0.5–1.3)
EGFR: 83 ML/MIN/1.73M2 — SIGNIFICANT CHANGE UP
EOSINOPHIL # BLD AUTO: 0 K/UL — SIGNIFICANT CHANGE UP (ref 0–0.5)
EOSINOPHIL NFR BLD AUTO: 0 % — SIGNIFICANT CHANGE UP (ref 0–6)
GIANT PLATELETS BLD QL SMEAR: PRESENT — SIGNIFICANT CHANGE UP
GLUCOSE SERPL-MCNC: 115 MG/DL — HIGH (ref 70–99)
HCT VFR BLD CALC: 26.4 % — LOW (ref 39–50)
HGB BLD-MCNC: 8.3 G/DL — LOW (ref 13–17)
HYPOCHROMIA BLD QL: SLIGHT — SIGNIFICANT CHANGE UP
LYMPHOCYTES # BLD AUTO: 0.72 K/UL — LOW (ref 1–3.3)
LYMPHOCYTES # BLD AUTO: 12.9 % — LOW (ref 13–44)
MACROCYTES BLD QL: SLIGHT — SIGNIFICANT CHANGE UP
MANUAL SMEAR VERIFICATION: SIGNIFICANT CHANGE UP
MCHC RBC-ENTMCNC: 27.9 PG — SIGNIFICANT CHANGE UP (ref 27–34)
MCHC RBC-ENTMCNC: 31.4 GM/DL — LOW (ref 32–36)
MCV RBC AUTO: 88.6 FL — SIGNIFICANT CHANGE UP (ref 80–100)
METAMYELOCYTES # FLD: 0.9 % — HIGH (ref 0–0)
MICROCYTES BLD QL: SLIGHT — SIGNIFICANT CHANGE UP
MONOCYTES # BLD AUTO: 0.24 K/UL — SIGNIFICANT CHANGE UP (ref 0–0.9)
MONOCYTES NFR BLD AUTO: 4.3 % — SIGNIFICANT CHANGE UP (ref 2–14)
MYELOCYTES NFR BLD: 4.3 % — HIGH (ref 0–0)
NEUTROPHILS # BLD AUTO: 4.17 K/UL — SIGNIFICANT CHANGE UP (ref 1.8–7.4)
NEUTROPHILS NFR BLD AUTO: 69 % — SIGNIFICANT CHANGE UP (ref 43–77)
NEUTS BAND # BLD: 5.2 % — SIGNIFICANT CHANGE UP (ref 0–8)
NRBC # BLD: 4 /100 — HIGH (ref 0–0)
OVALOCYTES BLD QL SMEAR: SLIGHT — SIGNIFICANT CHANGE UP
PLAT MORPH BLD: NORMAL — SIGNIFICANT CHANGE UP
PLATELET # BLD AUTO: 91 K/UL — LOW (ref 150–400)
POIKILOCYTOSIS BLD QL AUTO: SLIGHT — SIGNIFICANT CHANGE UP
POLYCHROMASIA BLD QL SMEAR: SLIGHT — SIGNIFICANT CHANGE UP
POTASSIUM SERPL-MCNC: 4.3 MMOL/L — SIGNIFICANT CHANGE UP (ref 3.5–5.3)
POTASSIUM SERPL-SCNC: 4.3 MMOL/L — SIGNIFICANT CHANGE UP (ref 3.5–5.3)
RBC # BLD: 2.98 M/UL — LOW (ref 4.2–5.8)
RBC # FLD: 17.2 % — HIGH (ref 10.3–14.5)
RBC BLD AUTO: ABNORMAL
SMUDGE CELLS # BLD: PRESENT — SIGNIFICANT CHANGE UP
SODIUM SERPL-SCNC: 137 MMOL/L — SIGNIFICANT CHANGE UP (ref 135–145)
VARIANT LYMPHS # BLD: 3.4 % — SIGNIFICANT CHANGE UP (ref 0–6)
WBC # BLD: 5.62 K/UL — SIGNIFICANT CHANGE UP (ref 3.8–10.5)
WBC # FLD AUTO: 5.62 K/UL — SIGNIFICANT CHANGE UP (ref 3.8–10.5)

## 2023-04-02 PROCEDURE — 99232 SBSQ HOSP IP/OBS MODERATE 35: CPT

## 2023-04-02 RX ADMIN — PIPERACILLIN AND TAZOBACTAM 25 GRAM(S): 4; .5 INJECTION, POWDER, LYOPHILIZED, FOR SOLUTION INTRAVENOUS at 06:19

## 2023-04-02 RX ADMIN — Medication 975 MILLIGRAM(S): at 06:19

## 2023-04-02 RX ADMIN — Medication 1 TABLET(S): at 12:03

## 2023-04-02 RX ADMIN — FENTANYL CITRATE 1 PATCH: 50 INJECTION INTRAVENOUS at 23:04

## 2023-04-02 RX ADMIN — OXYCODONE HYDROCHLORIDE 5 MILLIGRAM(S): 5 TABLET ORAL at 15:33

## 2023-04-02 RX ADMIN — TAMSULOSIN HYDROCHLORIDE 0.4 MILLIGRAM(S): 0.4 CAPSULE ORAL at 21:42

## 2023-04-02 RX ADMIN — ATORVASTATIN CALCIUM 10 MILLIGRAM(S): 80 TABLET, FILM COATED ORAL at 21:42

## 2023-04-02 RX ADMIN — Medication 975 MILLIGRAM(S): at 21:42

## 2023-04-02 RX ADMIN — PANTOPRAZOLE SODIUM 40 MILLIGRAM(S): 20 TABLET, DELAYED RELEASE ORAL at 06:20

## 2023-04-02 RX ADMIN — FENTANYL CITRATE 1 PATCH: 50 INJECTION INTRAVENOUS at 21:43

## 2023-04-02 RX ADMIN — Medication 975 MILLIGRAM(S): at 22:43

## 2023-04-02 RX ADMIN — PIPERACILLIN AND TAZOBACTAM 25 GRAM(S): 4; .5 INJECTION, POWDER, LYOPHILIZED, FOR SOLUTION INTRAVENOUS at 14:16

## 2023-04-02 RX ADMIN — FENTANYL CITRATE 1 PATCH: 50 INJECTION INTRAVENOUS at 07:16

## 2023-04-02 RX ADMIN — Medication 975 MILLIGRAM(S): at 14:06

## 2023-04-02 RX ADMIN — PANTOPRAZOLE SODIUM 40 MILLIGRAM(S): 20 TABLET, DELAYED RELEASE ORAL at 17:20

## 2023-04-02 RX ADMIN — PIPERACILLIN AND TAZOBACTAM 25 GRAM(S): 4; .5 INJECTION, POWDER, LYOPHILIZED, FOR SOLUTION INTRAVENOUS at 21:44

## 2023-04-02 NOTE — PROGRESS NOTE ADULT - ASSESSMENT
89 y/o M with PMH of prostate CA with mets to the bone and liver s/p radiation, colon cancer (>30 years), s/p resection and anemia presented from rehab for weakness. On admission hemoglobin was 5.7 and Tmax 102.5F. The patient was admitted for further w/u of anemia and sepsis.     Acute on chronic anemia most likely ICD  - H/H stable  - FOBT positive  - Monitor CBC  - s/p PRBC transfusion  - CTA A/P noted  - GI consult appreciated, no plans for endoscopic evaluation planned    SIRS with unclear source of infection at this time  - Blood and urine culture negative  - Continue Zosyn  - CT abd/pelvis as above to get more infor in regards deep tissues infection in sacrum area given pressure injury   - ID consult appreciated    Metabolic Encephalopathy, has improved  - multifactoria due to above  - management as above    Urinary retention in pt with hx of mts prostate cancer   - Urology consult appreciated, oneal inserted on 3/31  - Hem/onc consult noted, not a candidate for tx at this time  - palliative team follows, recs noted    HTN (hypertension), CAD  - d/c coreg 3.125 mg po bid given bradycardia     Severe protein calorie malnutrition  with anasarca   - most likely due to underlining advance prostate cancer   - nutrition consult noted    DVT ppx  - SCD

## 2023-04-03 ENCOUNTER — TRANSCRIPTION ENCOUNTER (OUTPATIENT)
Age: 88
End: 2023-04-03

## 2023-04-03 VITALS
DIASTOLIC BLOOD PRESSURE: 68 MMHG | RESPIRATION RATE: 18 BRPM | OXYGEN SATURATION: 97 % | SYSTOLIC BLOOD PRESSURE: 147 MMHG | TEMPERATURE: 98 F | HEART RATE: 50 BPM

## 2023-04-03 LAB
ANION GAP SERPL CALC-SCNC: 12 MMOL/L — SIGNIFICANT CHANGE UP (ref 5–17)
ANISOCYTOSIS BLD QL: SLIGHT — SIGNIFICANT CHANGE UP
BASOPHILS # BLD AUTO: 0 K/UL — SIGNIFICANT CHANGE UP (ref 0–0.2)
BASOPHILS NFR BLD AUTO: 0 % — SIGNIFICANT CHANGE UP (ref 0–2)
BUN SERPL-MCNC: 13.5 MG/DL — SIGNIFICANT CHANGE UP (ref 8–20)
CALCIUM SERPL-MCNC: 7.1 MG/DL — LOW (ref 8.4–10.5)
CHLORIDE SERPL-SCNC: 108 MMOL/L — SIGNIFICANT CHANGE UP (ref 96–108)
CO2 SERPL-SCNC: 19 MMOL/L — LOW (ref 22–29)
CREAT SERPL-MCNC: 0.84 MG/DL — SIGNIFICANT CHANGE UP (ref 0.5–1.3)
EGFR: 82 ML/MIN/1.73M2 — SIGNIFICANT CHANGE UP
EOSINOPHIL # BLD AUTO: 0 K/UL — SIGNIFICANT CHANGE UP (ref 0–0.5)
EOSINOPHIL NFR BLD AUTO: 0 % — SIGNIFICANT CHANGE UP (ref 0–6)
GIANT PLATELETS BLD QL SMEAR: PRESENT — SIGNIFICANT CHANGE UP
GLUCOSE SERPL-MCNC: 104 MG/DL — HIGH (ref 70–99)
HCT VFR BLD CALC: 25.1 % — LOW (ref 39–50)
HGB BLD-MCNC: 7.8 G/DL — LOW (ref 13–17)
HYPOCHROMIA BLD QL: SLIGHT — SIGNIFICANT CHANGE UP
LYMPHOCYTES # BLD AUTO: 0.33 K/UL — LOW (ref 1–3.3)
LYMPHOCYTES # BLD AUTO: 7 % — LOW (ref 13–44)
MACROCYTES BLD QL: SLIGHT — SIGNIFICANT CHANGE UP
MANUAL SMEAR VERIFICATION: SIGNIFICANT CHANGE UP
MCHC RBC-ENTMCNC: 27.9 PG — SIGNIFICANT CHANGE UP (ref 27–34)
MCHC RBC-ENTMCNC: 31.1 GM/DL — LOW (ref 32–36)
MCV RBC AUTO: 89.6 FL — SIGNIFICANT CHANGE UP (ref 80–100)
METAMYELOCYTES # FLD: 1.8 % — HIGH (ref 0–0)
MICROCYTES BLD QL: SLIGHT — SIGNIFICANT CHANGE UP
MONOCYTES # BLD AUTO: 0.54 K/UL — SIGNIFICANT CHANGE UP (ref 0–0.9)
MONOCYTES NFR BLD AUTO: 11.4 % — SIGNIFICANT CHANGE UP (ref 2–14)
MRSA PCR RESULT.: SIGNIFICANT CHANGE UP
MYELOCYTES NFR BLD: 3.5 % — HIGH (ref 0–0)
NEUTROPHILS # BLD AUTO: 3.55 K/UL — SIGNIFICANT CHANGE UP (ref 1.8–7.4)
NEUTROPHILS NFR BLD AUTO: 72.8 % — SIGNIFICANT CHANGE UP (ref 43–77)
NEUTS BAND # BLD: 1.7 % — SIGNIFICANT CHANGE UP (ref 0–8)
NRBC # BLD: 4 /100 — HIGH (ref 0–0)
OVALOCYTES BLD QL SMEAR: SIGNIFICANT CHANGE UP
PLAT MORPH BLD: NORMAL — SIGNIFICANT CHANGE UP
PLATELET # BLD AUTO: 109 K/UL — LOW (ref 150–400)
POIKILOCYTOSIS BLD QL AUTO: SIGNIFICANT CHANGE UP
POLYCHROMASIA BLD QL SMEAR: SLIGHT — SIGNIFICANT CHANGE UP
POTASSIUM SERPL-MCNC: 4.3 MMOL/L — SIGNIFICANT CHANGE UP (ref 3.5–5.3)
POTASSIUM SERPL-SCNC: 4.3 MMOL/L — SIGNIFICANT CHANGE UP (ref 3.5–5.3)
PROMYELOCYTES # FLD: 0.9 % — HIGH (ref 0–0)
RBC # BLD: 2.8 M/UL — LOW (ref 4.2–5.8)
RBC # FLD: 17.1 % — HIGH (ref 10.3–14.5)
RBC BLD AUTO: ABNORMAL
S AUREUS DNA NOSE QL NAA+PROBE: SIGNIFICANT CHANGE UP
SARS-COV-2 RNA SPEC QL NAA+PROBE: SIGNIFICANT CHANGE UP
SMUDGE CELLS # BLD: PRESENT — SIGNIFICANT CHANGE UP
SODIUM SERPL-SCNC: 139 MMOL/L — SIGNIFICANT CHANGE UP (ref 135–145)
VARIANT LYMPHS # BLD: 0.9 % — SIGNIFICANT CHANGE UP (ref 0–6)
WBC # BLD: 4.76 K/UL — SIGNIFICANT CHANGE UP (ref 3.8–10.5)
WBC # FLD AUTO: 4.76 K/UL — SIGNIFICANT CHANGE UP (ref 3.8–10.5)

## 2023-04-03 PROCEDURE — 99233 SBSQ HOSP IP/OBS HIGH 50: CPT

## 2023-04-03 PROCEDURE — 99239 HOSP IP/OBS DSCHRG MGMT >30: CPT

## 2023-04-03 RX ORDER — CHLORHEXIDINE GLUCONATE 213 G/1000ML
1 SOLUTION TOPICAL
Refills: 0 | Status: DISCONTINUED | OUTPATIENT
Start: 2023-04-03 | End: 2023-04-03

## 2023-04-03 RX ORDER — FUROSEMIDE 40 MG
1 TABLET ORAL
Refills: 0 | DISCHARGE

## 2023-04-03 RX ORDER — ATORVASTATIN CALCIUM 80 MG/1
1 TABLET, FILM COATED ORAL
Qty: 0 | Refills: 0 | DISCHARGE

## 2023-04-03 RX ADMIN — Medication 975 MILLIGRAM(S): at 05:18

## 2023-04-03 RX ADMIN — PANTOPRAZOLE SODIUM 40 MILLIGRAM(S): 20 TABLET, DELAYED RELEASE ORAL at 17:05

## 2023-04-03 RX ADMIN — Medication 650 MILLIGRAM(S): at 18:21

## 2023-04-03 RX ADMIN — Medication 1 TABLET(S): at 14:31

## 2023-04-03 RX ADMIN — FENTANYL CITRATE 1 PATCH: 50 INJECTION INTRAVENOUS at 14:18

## 2023-04-03 RX ADMIN — PIPERACILLIN AND TAZOBACTAM 25 GRAM(S): 4; .5 INJECTION, POWDER, LYOPHILIZED, FOR SOLUTION INTRAVENOUS at 14:31

## 2023-04-03 RX ADMIN — Medication 975 MILLIGRAM(S): at 16:38

## 2023-04-03 RX ADMIN — Medication 975 MILLIGRAM(S): at 14:31

## 2023-04-03 RX ADMIN — PANTOPRAZOLE SODIUM 40 MILLIGRAM(S): 20 TABLET, DELAYED RELEASE ORAL at 05:18

## 2023-04-03 RX ADMIN — PIPERACILLIN AND TAZOBACTAM 25 GRAM(S): 4; .5 INJECTION, POWDER, LYOPHILIZED, FOR SOLUTION INTRAVENOUS at 05:18

## 2023-04-03 RX ADMIN — FENTANYL CITRATE 1 PATCH: 50 INJECTION INTRAVENOUS at 05:43

## 2023-04-03 NOTE — PROGRESS NOTE ADULT - SUBJECTIVE AND OBJECTIVE BOX
89y old male with a past medical history significant for prostate cancer with mets to the bone s/p radiation, colon cancer (remote >30 years) s/p resection and anemia who presents from rehab with generalized weakness which is ongoing as per pt's daughter, pt. is mostly bed ridden.  Recurrent visits from rehab because of similar complaint/anemia.  His last colonoscopy was in 2020 at [place] for questionable GI bleeding, he was told it was negative and that he would never need a colonoscopy again.  Being followed up by Oncology at [facility] with Dr Ferrara, plan was to commence enzalutamide.  Patient is status post palliative RT to T8 vertebral body (5 fractions).    afebrile ON, Hgb 6.3 this am    ICU Vital Signs Last 24 Hrs  T(C): 37.3 (31 Mar 2023 08:30), Max: 37.3 (31 Mar 2023 08:30)  T(F): 99.1 (31 Mar 2023 08:30), Max: 99.1 (31 Mar 2023 08:30)  HR: 50 (31 Mar 2023 08:30) (50 - 80)  BP: 143/61 (31 Mar 2023 08:30) (133/50 - 151/61)  BP(mean): --  ABP: --  ABP(mean): --  RR: 18 (31 Mar 2023 08:30) (18 - 18)  SpO2: 94% (31 Mar 2023 08:30) (94% - 98%)    O2 Parameters below as of 31 Mar 2023 08:30  Patient On (Oxygen Delivery Method): room air        Physical examination:  In no apparent distress, breathing comfortably  HEENT: no cervical lymphadenopathy  Chest:  Clear to auscultation bilaterally  CVS:  No murmur/rubs/gallops; regular rate and rhythm  Abdomen:  Nondistended, nontender, bowel sounds normal, no organomegaly  Extremities:  No pedal edema  Neurological: no focal neurolgical deficit     MEDICATIONS  (STANDING):  atorvastatin 10 milliGRAM(s) Oral at bedtime  carvedilol 3.125 milliGRAM(s) Oral every 12 hours  fentaNYL   Patch  12 MICROgram(s)/Hr 1 Patch Transdermal every 72 hours  multivitamin 1 Tablet(s) Oral daily  pantoprazole  Injectable 40 milliGRAM(s) IV Push Once  pantoprazole  Injectable 40 milliGRAM(s) IV Push every 12 hours  piperacillin/tazobactam IVPB.. 3.375 Gram(s) IV Intermittent every 8 hours  tamsulosin 0.4 milliGRAM(s) Oral at bedtime        CBC:                             6.3    5.17  )-----------( 102      ( 31 Mar 2023 06:25 )             20.1                           7.5    5.07  )-----------( 102      ( 30 Mar 2023 06:14 )             25.4            6.5    3.95  )-----------( 90       ( 03-28-23 @ 06:12 )             21.4     03-31    138  |  106  |  20.7<H>  ----------------------------<  144<H>  4.5   |  20.0<L>  |  0.81    Ca    7.3<L>      31 Mar 2023 06:25        03-30    139  |  106  |  25.3<H>  ----------------------------<  135<H>  4.5   |  19.0<L>  |  0.91    Ca    7.6<L>      30 Mar 2023 06:14      Chem:         ( 03-28-23 @ 06:12 )    147<H>  |  115<H>  |  33.3<H>  ----------------------------<  152<H>  4.1   |  19.0<L>  |  0.96        Liver Functions: ( 03-28-23 @ 06:12 )  Alb: 2.5 g/dL / Pro: 5.7 g/dL / ALK PHOS: 279 U/L / ALT: 22 U/L / AST: 35 U/L / GGT: x            Type & Screen:           
CC:  Follow up GOC , Symptoms    OVERNIGHT EVENTS:  Drop in hg    Present Symptoms:   Dyspnea:  No    Nausea/Vomiting:  No  Anxiety:  No     Depression:  No    Fatigue:  Yes    Loss of appetite:  Yes    Constipation: No    Pain: No               Character-            Duration-            Location-            Severity-    Pain AD Score:  [email or web address] (press ctrl + left click to view)    Review of Systems: Reviewed as above  All others negative      MEDICATIONS  (STANDING):  atorvastatin 10 milliGRAM(s) Oral at bedtime  fentaNYL   Patch  12 MICROgram(s)/Hr 1 Patch Transdermal every 72 hours  multivitamin 1 Tablet(s) Oral daily  pantoprazole  Injectable 40 milliGRAM(s) IV Push Once  pantoprazole  Injectable 40 milliGRAM(s) IV Push every 12 hours  piperacillin/tazobactam IVPB.. 3.375 Gram(s) IV Intermittent every 8 hours  tamsulosin 0.4 milliGRAM(s) Oral at bedtime    MEDICATIONS  (PRN):  acetaminophen     Tablet .. 650 milliGRAM(s) Oral every 6 hours PRN Temp greater or equal to 38C (100.4F), Mild Pain (1 - 3), Moderate Pain (4 - 6)  guaiFENesin Oral Liquid (Sugar-Free) 200 milliGRAM(s) Oral every 6 hours PRN Cough  oxyCODONE    IR 5 milliGRAM(s) Oral every 6 hours PRN Severe Pain (7 - 10)  senna 2 Tablet(s) Oral at bedtime PRN Constipation  traMADol 50 milliGRAM(s) Oral every 6 hours PRN Moderate Pain (4 - 6)      PHYSICAL EXAM:    Vital Signs Last 24 Hrs  T(C): 37.3 (31 Mar 2023 08:30), Max: 37.3 (31 Mar 2023 08:30)  T(F): 99.1 (31 Mar 2023 08:30), Max: 99.1 (31 Mar 2023 08:30)  HR: 50 (31 Mar 2023 08:30) (50 - 80)  BP: 143/61 (31 Mar 2023 08:30) (133/50 - 151/61)  BP(mean): --  RR: 18 (31 Mar 2023 08:30) (18 - 18)  SpO2: 94% (31 Mar 2023 08:30) (94% - 98%)    Parameters below as of 31 Mar 2023 08:30  Patient On (Oxygen Delivery Method): room air    Karnofsky:30  %  General:  Elderly man awake NAD  HEENT:  NCAT   non icteric  Lungs: comfortable  non labored  CV:  RR  GI:  soft NTND  Skin:  warm/dry  MSK - bedbound  Neuro  no focal deficits  Psych calm    LABS:                          6.3    5.17  )-----------( 102      ( 31 Mar 2023 06:25 )             20.1     03-31    138  |  106  |  20.7<H>  ----------------------------<  144<H>  4.5   |  20.0<L>  |  0.81    Ca    7.3<L>      31 Mar 2023 06:25          I&O's Summary      ADVANCE DIRECTIVE PREFERENCES    Full Code  
Chief complaint: Anemia    Patient seen and examined at bedside. No acute overnight events reported. No fever, chills, cough, nausea or vomiting.     Vital Signs Last 24 Hrs  T(F): 98.2 (01 Apr 2023 11:30), Max: 98.7 (31 Mar 2023 22:35)  HR: 50 (01 Apr 2023 11:30) (50 - 74)  BP: 126/66 (01 Apr 2023 11:30) (126/66 - 156/73)  RR: 18 (01 Apr 2023 11:30) (18 - 18)  SpO2: 95% (01 Apr 2023 11:30) (95% - 97%)    Physical Exam:  Constitutional: alert and oriented, in no acute distress   Neck: Soft and supple  Respiratory: Good air entry b/l  Cardiovascular: Regular rate and rhythm  Gastrointestinal: Soft, non-tender to palpation, +bs  Vascular: 2+ peripheral pulses  Neurological: A/O x 3  Musculoskeletal: no lower extremity edema bilaterally    Labs:                        8.0    4.89  )-----------( 90       ( 01 Apr 2023 07:00 )             25.3   04-01    136  |  105  |  17.7  ----------------------------<  113<H>  4.2   |  20.0<L>  |  0.85    Ca    7.5<L>      01 Apr 2023 07:00    
Chief complaint: Anemia    Patient seen and examined at bedside. No acute overnight events reported. Patient denies fever, chills, cough, nausea or vomiting.    Vital Signs Last 24 Hrs  T(F): 97.6 (02 Apr 2023 10:39), Max: 98.7 (02 Apr 2023 05:01)  HR: 50 (02 Apr 2023 10:39) (50 - 52)  BP: 137/54 (02 Apr 2023 10:39) (137/54 - 154/53)  RR: 18 (02 Apr 2023 10:39) (18 - 18)  SpO2: 95% (02 Apr 2023 10:39) (95% - 99%)    Physical Exam:  Constitutional: alert and oriented, in no acute distress   Neck: Soft and supple  Respiratory: CTA b/l  Cardiovascular: Regular rate and rhythm  Gastrointestinal: Soft, non-tender to palpation, +bs  Vascular: 2+ peripheral pulses  Neurological: A/O x 3  Musculoskeletal: No lower extremity edema bilaterally    Labs:                        8.3    5.62  )-----------( 91       ( 02 Apr 2023 07:17 )             26.4   04-02    137  |  105  |  14.6  ----------------------------<  115<H>  4.3   |  19.0<L>  |  0.81    Ca    7.5<L>      02 Apr 2023 07:17    
VIANEY PETE    [id number]    89y      Male    Admitted from [facility] for sob, Abnormal Hgb   CC:  sob   lethargic     INTERVAL HPI/OVERNIGHT EVENTS: hgb low, received 2uPRBC overnight     REVIEW OF SYSTEMS:    denies pain      Vital Signs Last 24 Hrs  T(C): 37.7 (28 Mar 2023 15:27), Max: 38.1 (28 Mar 2023 12:00)  T(F): 99.9 (28 Mar 2023 15:27), Max: 100.6 (28 Mar 2023 12:00)  HR: 50 (28 Mar 2023 14:00) (50 - 52)  BP: 107/44 (28 Mar 2023 14:00) (102/35 - 136/62)  BP(mean): 60 (28 Mar 2023 14:00) (57 - 93)  RR: 21 (28 Mar 2023 14:00) (17 - 23)  SpO2: 98% (28 Mar 2023 14:00) (98% - 100%)    Parameters below as of 28 Mar 2023 14:00  Patient On (Oxygen Delivery Method): room air        PHYSICAL EXAM:    GENERAL: NAD, lethargic, arousable. barely answers to questions   HEENT: PERRL, +EOMI  NECK: soft, Supple, No JVD,   CHEST/LUNG: Clear to percussion bilaterally; No wheezing, difficult to auscultate  HEART: S1S2+, Regular rate and rhythm; No murmur  ABDOMEN: Soft, Nontender, Nondistended; Bowel sounds present  EXTREMITIES:   No clubbing, cyanosis. 2+ pedal and b/l UE edema         @ : @ 07:00  --------------------------------------------------------  IN: 650 mL / OUT: 650 mL / NET: 0 mL     @ : @ 16:18  --------------------------------------------------------  IN: 100 mL / OUT: 380 mL / NET: -280 mL        LABS:                        8.1    4.63  )-----------( 91       ( 28 Mar 2023 12:30 )             27.1         147<H>  |  115<H>  |  33.3<H>  ----------------------------<  152<H>  4.1   |  19.0<L>  |  0.96    Ca    7.1<L>      28 Mar 2023 06:12  Mg     2.5         TPro  5.7<L>  /  Alb  2.5<L>  /  TBili  0.6  /  DBili  x   /  AST  35  /  ALT  22  /  AlkPhos  279<H>      PT/INR - ( 27 Mar 2023 11:36 )   PT: 16.0 sec;   INR: 1.37 ratio         PTT - ( 27 Mar 2023 11:36 )  PTT:27.0 sec  Urinalysis Basic - ( 27 Mar 2023 13:25 )    Color: Yellow / Appearance: Slightly Turbid / S.010 / pH: x  Gluc: x / Ketone: Negative  / Bili: Negative / Urobili: 1 mg/dL   Blood: x / Protein: 15 / Nitrite: Negative   Leuk Esterase: Negative / RBC: 0-2 /HPF / WBC 0-2 /HPF   Sq Epi: x / Non Sq Epi: Occasional / Bacteria: Few          MEDICATIONS  (STANDING):  atorvastatin 10 milliGRAM(s) Oral at bedtime  carvedilol 3.125 milliGRAM(s) Oral every 12 hours  fentaNYL   Patch  12 MICROgram(s)/Hr 1 Patch Transdermal every 72 hours  furosemide    Tablet 20 milliGRAM(s) Oral daily  multivitamin 1 Tablet(s) Oral daily  pantoprazole  Injectable 40 milliGRAM(s) IV Push Once  pantoprazole  Injectable 40 milliGRAM(s) IV Push every 12 hours  piperacillin/tazobactam IVPB.. 3.375 Gram(s) IV Intermittent every 8 hours  tamsulosin 0.4 milliGRAM(s) Oral at bedtime    MEDICATIONS  (PRN):  acetaminophen     Tablet .. 650 milliGRAM(s) Oral every 6 hours PRN Temp greater or equal to 38C (100.4F), Mild Pain (1 - 3), Moderate Pain (4 - 6)  guaiFENesin Oral Liquid (Sugar-Free) 200 milliGRAM(s) Oral every 6 hours PRN Cough  senna 2 Tablet(s) Oral at bedtime PRN Constipation  traMADol 50 milliGRAM(s) Oral every 6 hours PRN Severe Pain (7 - 10)      RADIOLOGY & ADDITIONAL TESTS:    
VIANEY PETE    [id number]    89y      Male    Admitted from [facility] for sob, Abnormal Hgb   CC:  sob   generalized weakness  offers no complaints     INTERVAL HPI/OVERNIGHT EVENTS:  no acute events     REVIEW OF SYSTEMS:  denies pain  SOB     Vital Signs Last 24 Hrs  T(C): 37 (30 Mar 2023 12:29), Max: 38.1 (29 Mar 2023 16:45)  T(F): 98.6 (30 Mar 2023 12:29), Max: 100.5 (29 Mar 2023 16:45)  HR: 80 (30 Mar 2023 12:29) (50 - 80)  BP: 136/65 (30 Mar 2023 12:29) (127/41 - 159/51)  BP(mean): 75 (30 Mar 2023 08:00) (60 - 89)  RR: 18 (30 Mar 2023 12:29) (16 - 23)  SpO2: 98% (30 Mar 2023 12:29) (97% - 100%)    Parameters below as of 30 Mar 2023 12:29  Patient On (Oxygen Delivery Method): room air            PHYSICAL EXAM:    GENERAL: NAD,    HEENT: PERRL, +EOMI  NECK: soft, Supple, No JVD,   CHEST/LUNG: Clear to percussion bilaterally; No wheezing, difficult to auscultate  HEART: S1S2+, Regular rate and rhythm; No murmur  ABDOMEN: Soft, Nontender, Nondistended; Bowel sounds present  EXTREMITIES:   No clubbing, cyanosis. 2+ pedal and b/l UE edema            LABS:                                                         7.5    5.07  )-----------( 102      ( 30 Mar 2023 06:14 )             25.4   03-30    139  |  106  |  25.3<H>  ----------------------------<  135<H>  4.5   |  19.0<L>  |  0.91    Ca    7.6<L>      30 Mar 2023 06:14        MEDICATIONS  (STANDING):  atorvastatin 10 milliGRAM(s) Oral at bedtime  carvedilol 3.125 milliGRAM(s) Oral every 12 hours  fentaNYL   Patch  12 MICROgram(s)/Hr 1 Patch Transdermal every 72 hours  multivitamin 1 Tablet(s) Oral daily  pantoprazole  Injectable 40 milliGRAM(s) IV Push Once  pantoprazole  Injectable 40 milliGRAM(s) IV Push every 12 hours  piperacillin/tazobactam IVPB.. 3.375 Gram(s) IV Intermittent every 8 hours  tamsulosin 0.4 milliGRAM(s) Oral at bedtime    MEDICATIONS  (PRN):  acetaminophen     Tablet .. 650 milliGRAM(s) Oral every 6 hours PRN Temp greater or equal to 38C (100.4F), Mild Pain (1 - 3), Moderate Pain (4 - 6)  guaiFENesin Oral Liquid (Sugar-Free) 200 milliGRAM(s) Oral every 6 hours PRN Cough  oxyCODONE    IR 5 milliGRAM(s) Oral every 6 hours PRN Severe Pain (7 - 10)  senna 2 Tablet(s) Oral at bedtime PRN Constipation  traMADol 50 milliGRAM(s) Oral every 6 hours PRN Moderate Pain (4 - 6)  
89y old male with a past medical history significant for prostate cancer with mets to the bone s/p radiation, colon cancer (remote >30 years) s/p resection and anemia who presents from rehab with generalized weakness which is ongoing as per pt's daughter, pt. is mostly bed ridden.  Recurrent visits from rehab because of similar complaint/anemia.  His last colonoscopy was in 2020 at [place] for questionable GI bleeding, he was told it was negative and that he would never need a colonoscopy again.  Being followed up by Oncology at [facility] with Dr Ferrara, plan was to commence enzalutamide.  Patient is status post palliative RT to T8 vertebral body (5 fractions).    spiked 100.5 ON, palliative care eval    ICU Vital Signs Last 24 Hrs  T(C): 37.2 (30 Mar 2023 08:32), Max: 38.1 (29 Mar 2023 16:45)  T(F): 99 (30 Mar 2023 08:32), Max: 100.5 (29 Mar 2023 16:45)  HR: 50 (30 Mar 2023 08:00) (50 - 54)  BP: 151/46 (30 Mar 2023 08:00) (127/41 - 159/51)  BP(mean): 75 (30 Mar 2023 08:00) (60 - 94)  ABP: --  ABP(mean): --  RR: 16 (30 Mar 2023 08:00) (16 - 24)  SpO2: 97% (30 Mar 2023 08:00) (97% - 100%)    O2 Parameters below as of 30 Mar 2023 08:00  Patient On (Oxygen Delivery Method): room air      Physical examination:  In no apparent distress, breathing comfortably  HEENT: no cervical lymphadenopathy  Chest:  Clear to auscultation bilaterally  CVS:  No murmur/rubs/gallops; regular rate and rhythm  Abdomen:  Nondistended, nontender, bowel sounds normal, no organomegaly  Extremities:  No pedal edema  Neurological: no focal neurolgical deficit     MEDICATIONS  (STANDING):  atorvastatin 10 milliGRAM(s) Oral at bedtime  carvedilol 3.125 milliGRAM(s) Oral every 12 hours  fentaNYL   Patch  12 MICROgram(s)/Hr 1 Patch Transdermal every 72 hours  multivitamin 1 Tablet(s) Oral daily  pantoprazole  Injectable 40 milliGRAM(s) IV Push Once  pantoprazole  Injectable 40 milliGRAM(s) IV Push every 12 hours  piperacillin/tazobactam IVPB.. 3.375 Gram(s) IV Intermittent every 8 hours  tamsulosin 0.4 milliGRAM(s) Oral at bedtime        CBC:                             7.5    5.07  )-----------( 102      ( 30 Mar 2023 06:14 )             25.4            6.5    3.95  )-----------( 90       ( 03-28-23 @ 06:12 )             21.4       03-30    139  |  106  |  25.3<H>  ----------------------------<  135<H>  4.5   |  19.0<L>  |  0.91    Ca    7.6<L>      30 Mar 2023 06:14      Chem:         ( 03-28-23 @ 06:12 )    147<H>  |  115<H>  |  33.3<H>  ----------------------------<  152<H>  4.1   |  19.0<L>  |  0.96        Liver Functions: ( 03-28-23 @ 06:12 )  Alb: 2.5 g/dL / Pro: 5.7 g/dL / ALK PHOS: 279 U/L / ALT: 22 U/L / AST: 35 U/L / GGT: x            Type & Screen:           
[facility] Division of Hospital Medicine    Chief Complaint:  anemia, sepsis     SUBJECTIVE: pt endorses generalized weakness, poor appetite, no reported pain at this time or SOB at rest    OVERNIGHT EVENTS: no blood stool/melena over night     Patient denies chest pain, SOB, abd pain, fever, chills, dysuria or any other complaints. All remainder ROS negative.     MEDICATIONS  (STANDING):  atorvastatin 10 milliGRAM(s) Oral at bedtime  fentaNYL   Patch  12 MICROgram(s)/Hr 1 Patch Transdermal every 72 hours  multivitamin 1 Tablet(s) Oral daily  pantoprazole  Injectable 40 milliGRAM(s) IV Push every 12 hours  piperacillin/tazobactam IVPB.. 3.375 Gram(s) IV Intermittent every 8 hours  tamsulosin 0.4 milliGRAM(s) Oral at bedtime    MEDICATIONS  (PRN):  acetaminophen     Tablet .. 650 milliGRAM(s) Oral every 6 hours PRN Temp greater or equal to 38C (100.4F), Mild Pain (1 - 3), Moderate Pain (4 - 6)  guaiFENesin Oral Liquid (Sugar-Free) 200 milliGRAM(s) Oral every 6 hours PRN Cough  oxyCODONE    IR 5 milliGRAM(s) Oral every 6 hours PRN Severe Pain (7 - 10)  senna 2 Tablet(s) Oral at bedtime PRN Constipation  traMADol 50 milliGRAM(s) Oral every 6 hours PRN Moderate Pain (4 - 6)        I&O's Summary    31 Mar 2023 07:01  -  31 Mar 2023 14:55  --------------------------------------------------------  IN: 428 mL / OUT: 0 mL / NET: 428 mL        PHYSICAL EXAM:  Vital Signs Last 24 Hrs  T(C): 36.6 (31 Mar 2023 11:35), Max: 37.3 (31 Mar 2023 08:30)  T(F): 97.9 (31 Mar 2023 11:35), Max: 99.1 (31 Mar 2023 08:30)  HR: 51 (31 Mar 2023 11:35) (50 - 51)  BP: 143/69 (31 Mar 2023 11:35) (133/50 - 151/61)  BP(mean): --  RR: 18 (31 Mar 2023 11:35) (18 - 18)  SpO2: 96% (31 Mar 2023 11:35) (94% - 97%)    Parameters below as of 31 Mar 2023 11:35  Patient On (Oxygen Delivery Method): room air            CONSTITUTIONAL: NAD, chronically ill appearing   ENMT: Moist oral mucosa, no pharyngeal injection or exudates; normal dentition; No JVD  RESPIRATORY: Normal respiratory effort; lungs are  diminished on bases, no wheezing  CARDIOVASCULAR: Regular rate and rhythm, normal S1 and S2, no murmur/rub/gallop; + 1-2 pitting lower and upper extremity edema; Peripheral pulses are 2+ bilaterally  ABDOMEN:  Nontender to palpation, normoactive bowel sounds, no rebound/guarding; No hepatosplenomegaly  MUSCLOSKELETAL:  no clubbing or cyanosis of digits; no joint swelling or tenderness to palpation  PSYCH: A+O to person, place, and time; affect appropriate  NEUROLOGY: CN 2-12 are intact and symmetric; no gross sensory deficits; symmetric generalized weakness bue 4/5, ble 3/5    SKIN: pressure damage on sacral area, was not able to assess myself at this time    LABS:                        6.3    5.17  )-----------( 102      ( 31 Mar 2023 06:25 )             20.1     03-31    138  |  106  |  20.7<H>  ----------------------------<  144<H>  4.5   |  20.0<L>  |  0.81    Ca    7.3<L>      31 Mar 2023 06:25                CAPILLARY BLOOD GLUCOSE            RADIOLOGY & ADDITIONAL TESTS:  Results Reviewed:   Imaging Personally Reviewed:  Electrocardiogram Personally Reviewed:
VIANEY PETE    [id number]    89y      Male    Admitted from [facility] for sob, Abnormal Hgb   CC:  sob   more awake today, offers no complaints    INTERVAL HPI/OVERNIGHT EVENTS:  no acute events     REVIEW OF SYSTEMS:  denies pain  SOB       Vital Signs Last 24 Hrs  T(C): 37.6 (29 Mar 2023 12:00), Max: 38 (28 Mar 2023 20:00)  T(F): 99.7 (29 Mar 2023 12:00), Max: 100.4 (28 Mar 2023 20:00)  HR: 50 (29 Mar 2023 14:00) (50 - 57)  BP: 127/89 (29 Mar 2023 14:00) (109/40 - 156/53)  BP(mean): 94 (29 Mar 2023 14:00) (63 - 94)  RR: 23 (29 Mar 2023 14:00) (17 - 24)  SpO2: 100% (29 Mar 2023 14:00) (97% - 100%)    Parameters below as of 29 Mar 2023 14:00  Patient On (Oxygen Delivery Method): room air        PHYSICAL EXAM:    GENERAL: NAD,    HEENT: PERRL, +EOMI  NECK: soft, Supple, No JVD,   CHEST/LUNG: Clear to percussion bilaterally; No wheezing, difficult to auscultate  HEART: S1S2+, Regular rate and rhythm; No murmur  ABDOMEN: Soft, Nontender, Nondistended; Bowel sounds present  EXTREMITIES:   No clubbing, cyanosis. 2+ pedal and b/l UE edema            LABS:                                             7.2    5.25  )-----------( 98       ( 29 Mar 2023 10:30 )             24.7   03-29    140  |  109<H>  |  26.3<H>  ----------------------------<  150<H>  4.8   |  15.0<L>  |  0.92    Ca    7.3<L>      29 Mar 2023 10:30    TPro  5.7<L>  /  Alb  2.5<L>  /  TBili  0.6  /  DBili  x   /  AST  35  /  ALT  22  /  AlkPhos  279<H>  03-28      MEDICATIONS  (STANDING):  atorvastatin 10 milliGRAM(s) Oral at bedtime  carvedilol 3.125 milliGRAM(s) Oral every 12 hours  fentaNYL   Patch  12 MICROgram(s)/Hr 1 Patch Transdermal every 72 hours  multivitamin 1 Tablet(s) Oral daily  pantoprazole  Injectable 40 milliGRAM(s) IV Push Once  pantoprazole  Injectable 40 milliGRAM(s) IV Push every 12 hours  piperacillin/tazobactam IVPB.. 3.375 Gram(s) IV Intermittent every 8 hours  tamsulosin 0.4 milliGRAM(s) Oral at bedtime    MEDICATIONS  (PRN):  acetaminophen     Tablet .. 650 milliGRAM(s) Oral every 6 hours PRN Temp greater or equal to 38C (100.4F), Mild Pain (1 - 3), Moderate Pain (4 - 6)  guaiFENesin Oral Liquid (Sugar-Free) 200 milliGRAM(s) Oral every 6 hours PRN Cough  senna 2 Tablet(s) Oral at bedtime PRN Constipation  traMADol 50 milliGRAM(s) Oral every 6 hours PRN Severe Pain (7 - 10)          RADIOLOGY & ADDITIONAL TESTS:    
CC:  Follow up GOC , Symptoms    OVERNIGHT EVENTS:  weekend events reviewed  CT done - no active bleeding  Per discussion with  PT Linda - patient poor candidate for LUIS    Present Symptoms:   Dyspnea:  No    Nausea/Vomiting:  No   Anxiety:  No    Depression:  No    Fatigue:  Yes     Loss of appetite:  No     Constipation: Not Reported       Pain: Not currently            Character-            Duration-            Location-            Severity-    Pain AD Score:  [email or web address] (press ctrl + left click to view)    Review of Systems: Reviewed as above  All others negative      MEDICATIONS  (STANDING):  acetaminophen     Tablet .. 975 milliGRAM(s) Oral every 8 hours  atorvastatin 10 milliGRAM(s) Oral at bedtime  chlorhexidine 2% Cloths 1 Application(s) Topical <User Schedule>  multivitamin 1 Tablet(s) Oral daily  pantoprazole  Injectable 40 milliGRAM(s) IV Push Once  pantoprazole  Injectable 40 milliGRAM(s) IV Push every 12 hours  piperacillin/tazobactam IVPB.. 3.375 Gram(s) IV Intermittent every 8 hours  tamsulosin 0.4 milliGRAM(s) Oral at bedtime    MEDICATIONS  (PRN):  acetaminophen     Tablet .. 650 milliGRAM(s) Oral every 6 hours PRN Temp greater or equal to 38C (100.4F), Mild Pain (1 - 3), Moderate Pain (4 - 6)  guaiFENesin Oral Liquid (Sugar-Free) 200 milliGRAM(s) Oral every 6 hours PRN Cough  oxyCODONE    IR 5 milliGRAM(s) Oral every 6 hours PRN Severe Pain (7 - 10)  senna 2 Tablet(s) Oral at bedtime PRN Constipation  traMADol 50 milliGRAM(s) Oral every 6 hours PRN Moderate Pain (4 - 6)      PHYSICAL EXAM:    Vital Signs Last 24 Hrs  T(C): 36.6 (03 Apr 2023 12:03), Max: 36.7 (02 Apr 2023 17:26)  T(F): 97.8 (03 Apr 2023 12:03), Max: 98.1 (02 Apr 2023 17:26)  HR: 70 (03 Apr 2023 12:03) (50 - 70)  BP: 159/73 (03 Apr 2023 12:03) (159/73 - 169/73)  BP(mean): --  RR: 18 (03 Apr 2023 12:03) (18 - 18)  SpO2: 96% (03 Apr 2023 12:03) (96% - 99%)    Parameters below as of 03 Apr 2023 04:35  Patient On (Oxygen Delivery Method): room air    Karnofsky:  30%  General:  M awake alert NAD        HEENT:  NCAT  mmm  Lungs: comfortable  non labored  CV:  RR  GI: soft NTND  MSK:  weak  bedbound  Skin:  warm/dry  Neuro no focal deficits  Psych calm    LABS:                          7.8    4.76  )-----------( 109      ( 03 Apr 2023 05:00 )             25.1     04-03    139  |  108  |  13.5  ----------------------------<  104<H>  4.3   |  19.0<L>  |  0.84    Ca    7.1<L>      03 Apr 2023 05:00          I&O's Summary    02 Apr 2023 07:01  -  03 Apr 2023 07:00  --------------------------------------------------------  IN: 0 mL / OUT: 600 mL / NET: -600 mL    03 Apr 2023 07:01  -  03 Apr 2023 12:08  --------------------------------------------------------  IN: 0 mL / OUT: 400 mL / NET: -400 mL        RADIOLOGY & ADDITIONAL STUDIES:  Imaging Reviewed  (  x )   < from: CT Angio Abdomen and Pelvis w/ IV Cont (03.31.23 @ 14:13) >  IMPRESSION:    No active extravasation of contrast into the bowel. Correlate with   hemodynamics and hematocrit to guide further management.    Right gluteal focal increased density on the last image image 153 series   8. This may reflect focal edema, though, underlying hematoma cannot be   excluded due to partial imaging and concern for etiology of anemia.   Correlate clinically. Suggest dedicated right lower extremity CT for   evaluation. Discussed with Dr. De La Garza on 3/31/2023 at 3:07PM.    Trace pleural effusions. Distal airways impaction with bibasilar   atelectasis. Correlate for any respiratory symptoms.    New right hepatic lobe 18 mm hypodense lesion, image 24 series 9, since   prior PET/CT, suboptimally characterized. Correlate with abdominal MRI.    Osseous sclerotic metastatic disease.    --- End of Report ---      < end of copied text >      < from: CT Pelvis Bony Only No Cont (04.01.23 @ 09:25) >    ACC: [id number] EXAM:  CT PELVIS BONY ONLY   ORDERED BY: JENNIFER DE LA GARZA     PROCEDURE DATE:  04/01/2023          INTERPRETATION:  CT OF THE BONY PELVIS WITHOUT CONTRAST.    CLINICAL INFORMATION: Right-sided gluteal subcutaneous hematoma or   abscess. Pain and swelling.  TECHNIQUE: Axial CT images were obtained of the bony pelvis with coronal   and sagittal reconstructions. No contrast was administered. Three   dimensional reconstructions were obtained.    FINDINGS:    There is marked subcutaneous soft tissue edema seen within the lateral   and posterior subcutaneous soft tissues at the level of the right hip.   There is somewhat more confluent edema noted just posterior to the   greater trochanter and in the region of the lateral aspect of the gluteus   anne marie. This study is limited without intravenous contrast to evaluate   for an abscess. However, the prior contrast-enhanced study does not show   a peripherally enhancing fluid collection in this region.    There is subcutaneous soft tissue edema seen adjacent to the right left   hip as well also without a encapsulated fluid collection.    Degenerative changes are noted of the lower lumbar spine.    Sclerotic osseous metastatic disease is present with the largest lesion   within the right ischial bone and in the lower lumbar spine.    IMPRESSION:    Diffuse subcutaneous soft tissue edema that is more prominent on the   right adjacent to the right hip. More confluent appearing edema in the   region of the greater trochanteric bursa without peripheral enhancement   on the prior study in this region. This is nonspecific but may represent   greater trochanteric bursitis or hemorrhage. Given the lack of peripheral   enhancement on the prior study, this is unlikely to represent abscess.    Osseous metastatic disease.    --- End of Report ---      < end of copied text >            ADVANCE DIRECTIVE PREFERENCES    Full Code  DNR/I  and continuing medical treatments  DNR with Trial of Intubation and continuing medical treaments   DNR/I  and comfort measures
Chief complaint: Anemia    Patient seen and examined at bedside. No acute overnight events reported. Patient states he is feeling much better, no fever, chills, cough, nausea or vomiting.     Vital Signs Last 24 Hrs  T(F): 97.8 (03 Apr 2023 04:35), Max: 98.1 (02 Apr 2023 17:26)  HR: 50 (03 Apr 2023 04:35) (50 - 50)  BP: 168/94 (03 Apr 2023 04:35) (168/94 - 169/73)  RR: 18 (03 Apr 2023 04:35) (18 - 18)  SpO2: 96% (03 Apr 2023 04:35) (96% - 99%)    Physical Exam:  Constitutional: alert and oriented, in no acute distress   Neck: Soft and supple  Respiratory: Good air entry b/l  Cardiovascular: Regular rate and rhythm  Gastrointestinal: Soft, non-tender to palpation, +bs  Vascular: 2+ peripheral pulses  Neurological: A/O x 3  Musculoskeletal: no lower extremity edema bilaterally    Labs:                        7.8    4.76  )-----------( 109      ( 03 Apr 2023 05:00 )             25.1   04-03    139  |  108  |  13.5  ----------------------------<  104<H>  4.3   |  19.0<L>  |  0.84    Ca    7.1<L>      03 Apr 2023 05:00    
CC:  Follow up GOC , Symptoms    OVERNIGHT EVENTS:  overnight events noted - needing straight cath    Present Symptoms:   Dyspnea:  No    Nausea/Vomiting:  No  Anxiety:  No    Depression:  No    Fatigue:  Yes     Loss of appetite:   Yes     Constipation: No + BMs    Pain:  needing Oxycodone -  pain better            Character-            Duration-            Location-            Severity-    Pain AD Score:  [email or web address] (press ctrl + left click to view)    Review of Systems: Reviewed as above  Further ROS unable to  obtain due to poor historian      MEDICATIONS  (STANDING):  atorvastatin 10 milliGRAM(s) Oral at bedtime  carvedilol 3.125 milliGRAM(s) Oral every 12 hours  fentaNYL   Patch  12 MICROgram(s)/Hr 1 Patch Transdermal every 72 hours  multivitamin 1 Tablet(s) Oral daily  pantoprazole  Injectable 40 milliGRAM(s) IV Push Once  pantoprazole  Injectable 40 milliGRAM(s) IV Push every 12 hours  piperacillin/tazobactam IVPB.. 3.375 Gram(s) IV Intermittent every 8 hours  tamsulosin 0.4 milliGRAM(s) Oral at bedtime    MEDICATIONS  (PRN):  acetaminophen     Tablet .. 650 milliGRAM(s) Oral every 6 hours PRN Temp greater or equal to 38C (100.4F), Mild Pain (1 - 3), Moderate Pain (4 - 6)  guaiFENesin Oral Liquid (Sugar-Free) 200 milliGRAM(s) Oral every 6 hours PRN Cough  oxyCODONE    IR 5 milliGRAM(s) Oral every 6 hours PRN Severe Pain (7 - 10)  senna 2 Tablet(s) Oral at bedtime PRN Constipation  traMADol 50 milliGRAM(s) Oral every 6 hours PRN Moderate Pain (4 - 6)      PHYSICAL EXAM:    Vital Signs Last 24 Hrs  T(C): 37 (30 Mar 2023 12:29), Max: 38.1 (29 Mar 2023 16:45)  T(F): 98.6 (30 Mar 2023 12:29), Max: 100.5 (29 Mar 2023 16:45)  HR: 80 (30 Mar 2023 12:29) (50 - 80)  BP: 136/65 (30 Mar 2023 12:29) (127/41 - 159/51)  BP(mean): 75 (30 Mar 2023 08:00) (60 - 89)  RR: 18 (30 Mar 2023 12:29) (16 - 23)  SpO2: 98% (30 Mar 2023 12:29) (97% - 100%)    Parameters below as of 30 Mar 2023 12:29  Patient On (Oxygen Delivery Method): room air      Karnofsky: 40%  General:  M awake alert NAD     HEENT:  NCAT mmm  Lungs: comfortable  non labored  CV:  RR  GI:   soft NTND  MSK: weak  Skin:  warm/dry  Neuro  AOx3  Psych calm    LABS:                          7.5    5.07  )-----------( 102      ( 30 Mar 2023 06:14 )             25.4     03-30    139  |  106  |  25.3<H>  ----------------------------<  135<H>  4.5   |  19.0<L>  |  0.91    Ca    7.6<L>      30 Mar 2023 06:14          I&O's Summary    29 Mar 2023 07:01  -  30 Mar 2023 07:00  --------------------------------------------------------  IN: 0 mL / OUT: 1325 mL / NET: -1325 mL          ADVANCE DIRECTIVE PREFERENCES    Full Code  
CC:  Follow up GOC , Symptoms    OVERNIGHT EVENTS:  drop in Hg  fever overnight    Present Symptoms:   Dyspnea:  No    Nausea/Vomiting:  No    Anxiety:  No     Depression:   Unable  Fatigue:  Yes    Loss of appetite:   Yes   Constipation: Not Reported     Pain: low back -             Character-            Duration-            Location-            Severity-    Pain AD Score:  [email or web address] (press ctrl + left click to view)    Review of Systems: Reviewed as above  Further ROS unable to  obtain due to poor mentation historian      MEDICATIONS  (STANDING):  atorvastatin 10 milliGRAM(s) Oral at bedtime  carvedilol 3.125 milliGRAM(s) Oral every 12 hours  fentaNYL   Patch  12 MICROgram(s)/Hr 1 Patch Transdermal every 72 hours  multivitamin 1 Tablet(s) Oral daily  pantoprazole  Injectable 40 milliGRAM(s) IV Push Once  pantoprazole  Injectable 40 milliGRAM(s) IV Push every 12 hours  piperacillin/tazobactam IVPB.. 3.375 Gram(s) IV Intermittent every 8 hours  tamsulosin 0.4 milliGRAM(s) Oral at bedtime    MEDICATIONS  (PRN):  acetaminophen     Tablet .. 650 milliGRAM(s) Oral every 6 hours PRN Temp greater or equal to 38C (100.4F), Mild Pain (1 - 3), Moderate Pain (4 - 6)  guaiFENesin Oral Liquid (Sugar-Free) 200 milliGRAM(s) Oral every 6 hours PRN Cough  senna 2 Tablet(s) Oral at bedtime PRN Constipation  traMADol 50 milliGRAM(s) Oral every 6 hours PRN Severe Pain (7 - 10)      PHYSICAL EXAM:    Vital Signs Last 24 Hrs  T(C): 36.5 (29 Mar 2023 07:53), Max: 38.1 (28 Mar 2023 12:00)  T(F): 97.7 (29 Mar 2023 07:53), Max: 100.6 (28 Mar 2023 12:00)  HR: 50 (29 Mar 2023 10:00) (50 - 57)  BP: 156/53 (29 Mar 2023 10:00) (107/44 - 156/53)  BP(mean): 79 (29 Mar 2023 10:00) (60 - 82)  RR: 22 (29 Mar 2023 10:00) (17 - 22)  SpO2: 98% (29 Mar 2023 10:00) (97% - 100%)    Parameters below as of 29 Mar 2023 10:00  Patient On (Oxygen Delivery Method): room air    Karnofsky: 30 %  General:   M awake, verbalizing pain to low back  HEENT:  NCAT     Lungs: comfortable  non labored  CV:  RR  GI:  soft NTND  MSK:  no contractures   Skin:  warm/dry  Neuro  AOx3  Psych calm    LABS:                          7.2    5.25  )-----------( 98       ( 29 Mar 2023 10:30 )             24.7     03-    140  |  109<H>  |  26.3<H>  ----------------------------<  150<H>  4.8   |  15.0<L>  |  0.92    Ca    7.3<L>      29 Mar 2023 10:30    TPro  5.7<L>  /  Alb  2.5<L>  /  TBili  0.6  /  DBili  x   /  AST  35  /  ALT  22  /  AlkPhos  279<H>        Urinalysis Basic - ( 27 Mar 2023 13:25 )    Color: Yellow / Appearance: Slightly Turbid / S.010 / pH: x  Gluc: x / Ketone: Negative  / Bili: Negative / Urobili: 1 mg/dL   Blood: x / Protein: 15 / Nitrite: Negative   Leuk Esterase: Negative / RBC: 0-2 /HPF / WBC 0-2 /HPF   Sq Epi: x / Non Sq Epi: Occasional / Bacteria: Few      I&O's Summary    28 Mar 2023 07:  -  29 Mar 2023 07:00  --------------------------------------------------------  IN: 450 mL / OUT: 1065 mL / NET: -615 mL    29 Mar 2023 07:  -  29 Mar 2023 11:43  --------------------------------------------------------  IN: 0 mL / OUT: 650 mL / NET: -650 mL        RADIOLOGY & ADDITIONAL STUDIES:  < from: US Duplex Venous Upper Ext Complete, Bilateral (23 @ 23:47) >    ACC: [id number] EXAM:  US DPLX UPR EXT VEINS COMPL BI   ORDERED BY: ALECIA THOMAS     PROCEDURE DATE:  2023          INTERPRETATION:  CLINICAL INDICATION: Pain, high risk PE.    TECHNIQUE: Grayscale, color Doppler and spectral Doppler ultrasound was   utilized to evaluate bilateral upper extremity deep venous system.    COMPARISON: 2022.    FINDINGS: There is no obvious thrombus in the bilateral internal jugular   veins, subclavian veins, axillary veins or brachial veins. Visualized   bilateral radial veins and right ulnar vein are patent. Left ulnar vein   was difficult to assess.    Visualized bilateral basilic veins and cephalic veins are patent without   thrombosis.    IMPRESSION:    Left ulnar vein was difficult to assess. No obvious thrombus in the   remaining bilateral upper extremity deep veins.    --- End of Report ---    < end of copied text >        ADVANCE DIRECTIVE PREFERENCES    Full Code  
Chief Complaint: This is a 89y old man patient being seen in follow-up consultation for anemia    Interval HPI / 24H events:  Patient is seen ans examined at the bedside, minimally conversive, His hemoglobin 6.5 gm this morning. On 2L O2. Denies abdominal pain, nausea, vomiting, palpitation.     Review of Systems:  Limited due to medical condition.       PAST MEDICAL/SURGICAL HISTORY:  Hypertension    Irregular heart rate    Pacemaker  [employer]    Bradycardia    Glaucoma    Prostate cancer    Colon cancer    S/P colectomy  1992    H/O total knee replacement, left    Pacemaker  [employer]    S/P TURP    S/P cataract surgery    History of esophageal surgery      MEDICATIONS  (STANDING):  atorvastatin 10 milliGRAM(s) Oral at bedtime  carvedilol 3.125 milliGRAM(s) Oral every 12 hours  fentaNYL   Patch  12 MICROgram(s)/Hr 1 Patch Transdermal every 72 hours  furosemide    Tablet 20 milliGRAM(s) Oral daily  multivitamin 1 Tablet(s) Oral daily  pantoprazole  Injectable 40 milliGRAM(s) IV Push Once  pantoprazole  Injectable 40 milliGRAM(s) IV Push every 12 hours  piperacillin/tazobactam IVPB.. 3.375 Gram(s) IV Intermittent every 8 hours  tamsulosin 0.4 milliGRAM(s) Oral at bedtime    MEDICATIONS  (PRN):  acetaminophen     Tablet .. 650 milliGRAM(s) Oral every 6 hours PRN Temp greater or equal to 38C (100.4F), Mild Pain (1 - 3), Moderate Pain (4 - 6)  guaiFENesin Oral Liquid (Sugar-Free) 200 milliGRAM(s) Oral every 6 hours PRN Cough    No Known Allergies    T(C): 37.1 (03-28-23 @ 08:20), Max: 39.2 (03-27-23 @ 12:15)  HR: 50 (03-28-23 @ 08:00) (50 - 71)  BP: 113/56 (03-28-23 @ 08:00) (93/57 - 198/81)  RR: 17 (03-28-23 @ 08:00) (17 - 28)  SpO2: 99% (03-28-23 @ 08:00) (98% - 100%)    I&O's Summary    27 Mar 2023 07:01  -  28 Mar 2023 07:00  --------------------------------------------------------  IN: 650 mL / OUT: 650 mL / NET: 0 mL    28 Mar 2023 07:01  -  28 Mar 2023 10:14  --------------------------------------------------------  IN: 0 mL / OUT: 200 mL / NET: -200 mL      PHYSICAL EXAM:  Constitutional: Elderly man, minimally conversive, on 2L O2  Neuro: Awake, minimally conversive.   HEENT: anicteric sclerae  Neck: supple, no JVD  CV: regular rate, regular rhythm, paced rhythm on tele  Pulm/chest: Course breath sounds bilaterally. On 2L O2  Abd: soft, obese, nontender +bowel sounds. No rigidity, rebound tenderness, or guarding  Ext: no Cyanosis, clubbing, +BLE edema  Skin:  no jaundice   Psych: calm,      LABS:  ABG - ( 27 Mar 2023 12:10 )  pH, Arterial: 7.530 pH, Blood: x     /  pCO2: 26    /  pO2: 130   / HCO3: 22    / Base Excess: -1.0  /  SaO2: 99.1                         6.5    3.95  )-----------( 90       ( 03-28 @ 06:12 )             21.4                5.7    6.34  )-----------( 135      ( 03-27 @ 11:29 )             19.4       03-28    147<H>  |  115<H>  |  33.3<H>  ----------------------------<  152<H>  4.1   |  19.0<L>  |  0.96    Ca    7.1<L>      28 Mar 2023 06:12  Mg     2.5     03-27    TPro  5.7<L>  /  Alb  2.5<L>  /  TBili  0.6  /  DBili  x   /  AST  35  /  ALT  22  /  AlkPhos  279<H>  03-28    LIVER FUNCTIONS - ( 28 Mar 2023 06:12 )  Alb: 2.5 g/dL / Pro: 5.7 g/dL / ALK PHOS: 279 U/L / ALT: 22 U/L / AST: 35 U/L / GGT: x               PT/INR - ( 27 Mar 2023 11:36 )   PT: 16.0 sec;   INR: 1.37 ratio         PTT - ( 27 Mar 2023 11:36 )  PTT:27.0 sec  Ferritin, Serum: 7955 ng/mL *H* (03-27-23 @ 11:29)  % Saturation, Iron: 31 % (03-27-23 @ 11:29)  Iron - Total Binding Capacity.: 180 ug/dL *L* (03-27-23 @ 11:29)  Iron Total, Serum: 55 ug/dL *L* (03-27-23 @ 11:29)

## 2023-04-03 NOTE — PHYSICAL THERAPY INITIAL EVALUATION ADULT - ADDITIONAL COMMENTS
Per pt he lives at home with his spouse in a house with 6 NIK and was using a rollator. Per daughter pt has been in and out of [facility] and has been declining functionally.

## 2023-04-03 NOTE — DISCHARGE NOTE NURSING/CASE MANAGEMENT/SOCIAL WORK - NSDCPEFALRISK_GEN_ALL_CORE
For information on Fall & Injury Prevention, visit: [email or web address] OR  [email or web address] OR  https://www.cdc.gov/steadi/patient.html

## 2023-04-03 NOTE — PHYSICAL THERAPY INITIAL EVALUATION ADULT - PHYSICAL ASSIST/NONPHYSICAL ASSIST: SIT/SUPINE, REHAB EVAL
Detail Level: Generalized Hide Include Location In Plan Question?: No Include Location In Plan?: Yes 2 person assist

## 2023-04-03 NOTE — CHART NOTE - NSCHARTNOTEFT_GEN_A_CORE
Called to place difficult oneal  pt had prior unsuccessful attempts  pt with h/o prostate CA, bone mets, XRT, prior prostate sx  16Fr oneal inserted successfully  urojet used  pt with bladder neck contracture
Left groin TLC removed without incident.  Hemostasis achieved.  Will monitor.
Palliative care social work note.    SW met with patient to provide support in coping with catastrophic dx and spoke with daughter Bharti to provide support in coping with significant lifestyle adjustments and end of life acceptance. daughter acknowledges that she and patient have been having very difficult discussions and that she is trying to cherish every moment she has with patient understanding that there si disease progression. ABHIJEET informed daughter of support programs thru [facility] and local agencies to help family cope further.
Pt admitted with fever  T 100.2  VSS B/P 107/45 P 50 R 18 PO 98% T 100.2  NAD  Tylenol to be given before second unit PRBCs  recheck temp in 1 hr  Continue to monitor
Spoke with Nurse, pt has 2 functioning IV's with easy flush.  No erythema or warmth noted on my exam.  Will d/c midline order for now as pt has adequate PIV access.
This report was requested by: Lamont Bess | Reference #: [id number]    Others' Prescriptions  Patient Name: Miguelito Page JrBirth Date: 12/07/1931  Address: [address], [place] [address]See: Male  Rx Written	Rx Dispensed	Drug	Quantity	Days Supply	Prescriber Name	Prescriber Bridget #	Payment Method	Dispenser  02/16/2023	03/10/2023	tramadol hcl 50 mg tablet	28	7	Cornacchia, Toya	[id number]	Cash	[facility]  03/03/2023	03/03/2023	fentanyl 12 mcg/hr patch	10	30	Cornacchia, Toya	[id number]	Cash	[facility]  02/24/2023	02/24/2023	fentanyl 12 mcg/hr patch	2	6	Cornacchia, Toya	[id number]	Cash	[facility]  02/16/2023	02/16/2023	tramadol hcl 50 mg tablet	28	7	Cornacchia, Toya	[id number]	Cash	[facility]    Patient Name: Miguelito PageBirth Date: 12/07/1931  Address: [address] [place] [id number]: Male  Rx Written	Rx Dispensed	Drug	Quantity	Days Supply	Prescriber Name	Prescriber Bridget #	Payment Method	Dispenser  01/18/2023	01/23/2023	tramadol hcl 50 mg tablet	60	30	Sean Ferrara	[id number]	Insurance	[facility] #[id number]  12/07/2022	12/09/2022	tramadol hcl 50 mg tablet	60	30	Carola Holm	[id number]	Insurance	[facility] #[id number]
Called by RN with critical Hgb of 6.3.   No episodes of bleeding overnight.   RN to recheck VS.   STAT type and screen, 1 unit pRBC ordered.   RN to continue to monitor, will alert provider with any change in patient status.
Patient TOV initiated 3/29  Patient unable to void at this time, bladder scan 542 per ultrasound   Two RN attempt straight cath without success   PA with successful straight cath, approx 400cc drained
Source: Patient [x ]  Family [ ]   other [ x] EMR, staff and ID rounds     Current Diet:   Diet, Pureed:   Mildly Thick Liquids (MILDTHICKLIQS)  Supplement Feeding Modality:  Oral  Ensure Enlive Cans or Servings Per Day:  1       Frequency:  Three Times a day (03-30-23 @ 15:15)    Patient reports [ ] nausea  [ ] vomiting [ ] diarrhea [ ] constipation  [ ]chewing problems [ ] swallowing issues  [ ] other:     PO intake:  < 50% [ ]   50-75%  [ x]   %  [ ]  other :    Source for PO intake [x] Patient [ ] family [ x] chart [x ] staff [ ] other    Current Weight::   (3/28)  207.8 lbs    % Weight Change: No recent weight documented     Pertinent Medications: MEDICATIONS  (STANDING):  acetaminophen     Tablet .. 975 milliGRAM(s) Oral every 8 hours  atorvastatin 10 milliGRAM(s) Oral at bedtime  chlorhexidine 2% Cloths 1 Application(s) Topical <User Schedule>  multivitamin 1 Tablet(s) Oral daily  pantoprazole  Injectable 40 milliGRAM(s) IV Push Once  pantoprazole  Injectable 40 milliGRAM(s) IV Push every 12 hours  tamsulosin 0.4 milliGRAM(s) Oral at bedtime    MEDICATIONS  (PRN):  acetaminophen     Tablet .. 650 milliGRAM(s) Oral every 6 hours PRN Temp greater or equal to 38C (100.4F), Mild Pain (1 - 3), Moderate Pain (4 - 6)  guaiFENesin Oral Liquid (Sugar-Free) 200 milliGRAM(s) Oral every 6 hours PRN Cough  oxyCODONE    IR 5 milliGRAM(s) Oral every 6 hours PRN Severe Pain (7 - 10)  senna 2 Tablet(s) Oral at bedtime PRN Constipation  traMADol 50 milliGRAM(s) Oral every 6 hours PRN Moderate Pain (4 - 6)    Pertinent Labs: CBC Full  -  ( 03 Apr 2023 05:00 )  WBC Count : 4.76 K/uL  RBC Count : 2.80 M/uL  Hemoglobin : 7.8 g/dL  Hematocrit : 25.1 %  Platelet Count - Automated : 109 K/uL  Mean Cell Volume : 89.6 fl  Mean Cell Hemoglobin : 27.9 pg  Mean Cell Hemoglobin Concentration : 31.1 gm/dL  Auto Neutrophil # : 3.55 K/uL  Auto Lymphocyte # : 0.33 K/uL  Auto Monocyte # : 0.54 K/uL  Auto Eosinophil # : 0.00 K/uL  Auto Basophil # : 0.00 K/uL  Auto Neutrophil % : 72.8 %  Auto Lymphocyte % : 7.0 %  Auto Monocyte % : 11.4 %  Auto Eosinophil % : 0.0 %  Auto Basophil % : 0.0 %    04-03 Na139 mmol/L Glu 104 mg/dL<H> K+ 4.3 mmol/L Cr  0.84 mg/dL BUN 13.5 mg/dL Phos n/a   Alb n/a   PAB n/a       Skin: Stage II coccyx, Stage II R/L? medial thigh, Stage II forehead  Edema: 1+ generalized edema. 3+ RL arm    Nutrition focused physical exam conducted - found signs of malnutrition [ ]absent [x ]present    Subcutaneous fat loss: [ ] Orbital fat pads region, [ ]Buccal fat region, [ ]Triceps region,  [ ]Ribs region    Muscle wasting: [x ]Temples region, [ ]Clavicle region, [ ]Shoulder region, [ ]Scapula region, [ ]Interosseous region,  [ ]thigh region, [ ]Calf region    Estimated Needs:   [ ] no change since previous assessment  [ ] recalculated:     Current Nutrition Diagnosis: Pt remains at high nutrition risk secondary to malnutrition (moderate chronic) related to inability to meet sufficient protein-energy needs in setting of metastatic prostate CA, advanced age, dysphagia as evidenced by meeting <75% EER >1mo, severe temporal wasting, mild/mod edema. Pt continues with fair PO intake completing 50-75% of meals. Fecal incontinence noted, last documented BM 3/29. D/c planning in progress.     Recommendations:   1) Rx vit C 500mg and Jose BID to facilitate wound healing; Continue MVI daily   2) Encourage HBV protein sources  3) Monitor weights daily for trend/accuracy  4) Provide encouragement/assistance as needed during mealtimes to inc PO     Monitoring and Evaluation:   [x ] PO intake [x ] Tolerance to diet prescription [X] Weights  [X] Follow up per protocol [X] Labs:

## 2023-04-03 NOTE — DISCHARGE NOTE PROVIDER - NSDCQMPCI_CARD_ALL_CORE
Ongoing SW/CM Assessment/Plan of Care Note     See SW/CM flowsheets for goals and other objective data.    Patient/Family discharge goal (s):  Goal #1: Communication facilitated  Goal #2: Transportation arranged or issues addressed         Disposition:   [facility] vs home with home care    Progress note:   SW called [facility] again to discuss patient's insurance. They followed up with patient and her rep who indicated they believe she now has [employer] and rep would bring in cards tomorrow. SW relayed to Bedside this delay is not feasible due to discharge delays.     In the mean time writer received a message from Tamika, patient's daughter. Return call placed. She is coming to the hospital shortly and will share copy of insurance cards with registration so information can be updated.     She also relayed that, since yesterday, they have heard some concerns about [facility] and are no longer sure they want this facility for RUBY. Writer shared that no other facility has confirmed acceptance and that patient has been medically cleared to discharge for a number of days now so additional referrals are not appropriate due to discharge delays.     SW answered questions about ADRC, family becoming paid caregiver, home healthcare, and other RU[facility] facilities. Writer shared [facility] did inquire about patient yesterday but did not offer acceptance. Tamika requested information on [facility] when she arrives. SW printed information from website to give to her on arrival and will also provide Senior Resource Guide with ADRC info.     SW continues to follow.     ADD:  12:37PM  Daughter has not yet arrived. Writer followed up with patient at bedside. ADRC/Senior Resource Guide left at bedside along with [facility] information. Patient updated on discussion with Tamika.     SW continues to follow.    Dina Toscano, CAPSW  Desk#[phone]  Cell#[phone]  For weekends please call #[phone]           No

## 2023-04-03 NOTE — PROGRESS NOTE ADULT - REASON FOR ADMISSION
Drop in hemoglobin

## 2023-04-03 NOTE — PROGRESS NOTE ADULT - ASSESSMENT
89yr old man  Hx of  prostate cancer with mets to the bone s/p radiation, past hx of colon cancer, anemia admitted from rehab with significant anemia    Anemia   s/p PRBC ordered for transfusions  monitor Hg  CT scans reviewed - no bleeding    Prostate Cancer with Mets  Oncology input noted- "overall prognosis poor and not candidate for aggressive therapy"    Fever  ID consulted   IV abx completed  cxs - NGTD    Dysphagia  SLP eval  rec puree with mild thickened liquids  Aspiration precautions    Cancer Related Pain  ISTOP reviewed -see Event note  Cont Fentanyl 12mcg   Oxycodone 5mg PRN  Continue monitoring symptoms  Bowel regimen - Senna 2 tabs qhs prn    Debility  Assist with care.  Patient needs assistance with meals    Encounter for Palliative Care  Patient very debilitated - reported d/c plan for [facility]  Daughter has been discussed hospice services.  Palliative Care to sign off   89yr old man  Hx of  prostate cancer with mets to the bone s/p radiation, past hx of colon cancer, anemia admitted from rehab with significant anemia    Anemia   s/p PRBC ordered for transfusions  monitor Hg  CT scans reviewed - no bleeding    Prostate Cancer with Mets  Oncology input noted- "overall prognosis poor and not candidate for aggressive therapy"    Fever  ID consulted   IV abx completed  cxs - NGTD    Dysphagia  SLP eval  rec puree with mild thickened liquids  Aspiration precautions    Cancer Related Pain  ISTOP reviewed -see Event note  Cont Fentanyl 12mcg   Oxycodone 5mg PRN  Continue monitoring symptoms  Bowel regimen - Senna 2 tabs qhs prn    Debility  Assist with care.  Patient needs assistance with meals    Encounter for Palliative Care  Patient very debilitated - reported d/c plan for [facility]  Daughter has been discussed hospice services.  High risk for readmission  Palliative Care to sign off

## 2023-04-03 NOTE — CHART NOTE - NSCHARTNOTESELECT_GEN_ALL_CORE
Event Note
Event Note
Nutrition Services
Event Note
ISTOP/Event Note
Urology/Event Note

## 2023-04-03 NOTE — DISCHARGE NOTE NURSING/CASE MANAGEMENT/SOCIAL WORK - PATIENT PORTAL LINK FT
You can access the FollowMyHealth Patient Portal offered by [facility] by registering at the following website: [email or web address]. By joining [facility]’s FollowMyHealth portal, you will also be able to view your health information using other applications (apps) compatible with our system.

## 2023-04-03 NOTE — PROGRESS NOTE ADULT - ASSESSMENT
89 y/o M with PMH of prostate CA with mets to the bone and liver s/p radiation, colon cancer (>30 years), s/p resection and anemia presented from rehab for weakness. On admission hemoglobin was 5.7 and Tmax 102.5F. The patient was admitted for further w/u of anemia and sepsis.     Acute on chronic anemia most likely ICD  - H/H stable  - FOBT positive  - Monitor CBC  - s/p PRBC transfusion  - CTA A/P noted  - GI consult appreciated, no plans for endoscopic evaluation planned    SIRS with unclear source of infection at this time  - Blood and urine culture negative  - Continue Zosyn  - CT abd/pelvis as above to get more infor in regards deep tissues infection in sacrum area given pressure injury   - ID consult appreciated    Metabolic Encephalopathy, has improved  - multifactoria due to above  - management as above    Urinary retention in pt with hx of mts prostate cancer   - Urology consult appreciated, oneal inserted on 3/31  - Hem/onc consult noted, not a candidate for tx at this time  - palliative team follows, recs noted    HTN (hypertension), CAD  - d/c coreg 3.125 mg po bid given bradycardia     Severe protein calorie malnutrition  with anasarca   - most likely due to underlining advance prostate cancer   - nutrition consult noted    DVT ppx  - SCD    Dispo: DC to [facility]    Plan of care discussed with patient's daughter Bharti ([phone]).

## 2023-04-03 NOTE — DISCHARGE NOTE PROVIDER - HOSPITAL COURSE
89 y/o M with PMH of prostate CA with mets to the bone and liver s/p radiation, colon cancer (>30 years), s/p resection and anemia presented from rehab for weakness. On admission hemoglobin was 5.7 and Tmax 102.5F. The patient was admitted for further w/u of anemia and sepsis. Sepsis work up negative, no source of fever found. Seen in consult by ID, treated prophylactically with abx. Pt received multiple PRBC transfusions. Was seen in consult by GI, no endoscopic evals were planned as anemia likely 2/2 to ICD. H/H now stable. Pt was noted with urinary retention (hx of prostate cancer) s/p oneal insertion on 3/31 by urology. Noted to be bradycardic during hospital stay, coreg was discontinued.

## 2023-04-03 NOTE — DISCHARGE NOTE PROVIDER - ATTENDING DISCHARGE PHYSICAL EXAMINATION:
Vital Signs Last 24 Hrs  T(F): 97.8 (03 Apr 2023 04:35), Max: 98.1 (02 Apr 2023 17:26)  HR: 50 (03 Apr 2023 04:35) (50 - 50)  BP: 168/94 (03 Apr 2023 04:35) (168/94 - 169/73)  RR: 18 (03 Apr 2023 04:35) (18 - 18)  SpO2: 96% (03 Apr 2023 04:35) (96% - 99%)    Physical Exam:  Constitutional: alert and oriented, in no acute distress   Neck: Soft and supple  Respiratory: Clear to auscultation bilaterally, no wheezes or crackles  Cardiovascular: Regular rate and rhythm, no murmurs, gallops, rubs  Gastrointestinal: Soft, non-tender to palpation, +bs  Vascular: 2+ peripheral pulses  Neurological: A/O x 3, no focal neurological deficits  Musculoskeletal: 5/5 strength b/l upper and lower extremities, no lower extremity edema bilaterally

## 2023-04-03 NOTE — PROGRESS NOTE ADULT - PROVIDER SPECIALTY LIST ADULT
Palliative Care
Heme/Onc
Hospitalist
Hospitalist
Palliative Care
Palliative Care
Hospitalist
Palliative Care
Heme/Onc
Hospitalist
Hospitalist
Gastroenterology

## 2023-04-03 NOTE — PROGRESS NOTE ADULT - TIME BILLING
Time spent with review of chart documents, labs, imaging. Direct patient assessment,  formulation of care plan. Discussion with  Interdisciplinary  team  Carmella Lindquist RN, patient and daughter, SW
Time spent with review of chart documents, labs, imaging. Direct patient assessment,  formulation of care plan. Discussion with  Interdisciplinary  team  Dr. Singh RN,  SLP therapist
Time spent with review of chart documents, labs, imaging. Direct patient assessment,  formulation of care plan. Discussion with  Interdisciplinary  team  ABHIJEET Garzon Allison PT, RN
Time spent with review of chart documents, labs, imaging. Direct patient assessment,  formulation of care plan. Discussion with  Interdisciplinary  team  Ashley hospitalist EBER, daughter

## 2023-04-03 NOTE — DISCHARGE NOTE NURSING/CASE MANAGEMENT/SOCIAL WORK - NSDCFUADDAPPT_GEN_ALL_CORE_FT
Patient and patient's daughter, Bharti Page ([phone]), are in agreement for patient to attend [facility] today at 4pm. Bharti reported she has informed patient's wife, Lluvia, patient will attend [facility] today at 4pm. Admissions coordinator, Jose, from [facility] ([phone]), reported they can accept the patient for subacute rehab today at 4pm.

## 2023-04-03 NOTE — DISCHARGE NOTE PROVIDER - NSDCCPCAREPLAN_GEN_ALL_CORE_FT
PRINCIPAL DISCHARGE DIAGNOSIS  Diagnosis: Anemia in neoplastic disease  Assessment and Plan of Treatment: Resolved  Outpatiet follow up [facility] PMD or hematologist      SECONDARY DISCHARGE DIAGNOSES  Diagnosis: Fever  Assessment and Plan of Treatment: Infectious work up negative    Diagnosis: HTN (hypertension)  Assessment and Plan of Treatment: Coreg discontinued due to episodes of bradycardia  Outpatient follow up [facility] PMD in 1 week for further management/monitoring    Diagnosis: Urinary retention  Assessment and Plan of Treatment: LIkely due to prostate CA  Oneal insertd by urology team on admissio  Continue oneal at [facility]. TOV at [facility]  Will need urology outpatiet follow up    Diagnosis: Prostate cancer  Assessment and Plan of Treatment: Outpatient oncology follow up on discharge     PRINCIPAL DISCHARGE DIAGNOSIS  Diagnosis: Anemia in neoplastic disease  Assessment and Plan of Treatment: Resolved  Outpatiet follow up [facility] PMD or hematologist      SECONDARY DISCHARGE DIAGNOSES  Diagnosis: Fever  Assessment and Plan of Treatment: Infectious work up negative    Diagnosis: HTN (hypertension)  Assessment and Plan of Treatment: Coreg discontinued due to episodes of bradycardia  Outpatient follow up [facility] PMD in 1 week for further management/monitoring    Diagnosis: Urinary retention  Assessment and Plan of Treatment: LIkely due to prostate CA  Oneal insertd by urology team on admissio  Continue oneal at [facility]. TOV at [facility]  Will need urology outpatiet follow up    Diagnosis: Prostate cancer  Assessment and Plan of Treatment: Outpatient oncology follow up on discharge. Chemo to be resumed after [facility] per oncology

## 2023-04-03 NOTE — PROGRESS NOTE ADULT - NUTRITIONAL ASSESSMENT
This patient has been assessed with a concern for Malnutrition and has been determined to have a diagnosis/diagnoses of Moderate protein-calorie malnutrition.    This patient is being managed with:   Diet Pureed-  Mildly Thick Liquids (MILDTHICKLIQS)  Supplement Feeding Modality:  Oral  Ensure Enlive Cans or Servings Per Day:  1       Frequency:  Three Times a day  Entered: Mar 30 2023  3:15PM  

## 2023-04-03 NOTE — DISCHARGE NOTE PROVIDER - NSDCMRMEDTOKEN_GEN_ALL_CORE_FT
acetaminophen 325 mg oral tablet: 2 tab(s) orally every 6 hours, As needed, Temp greater or equal to 38C (100.4F), Mild Pain (1 - 3)  atorvastatin 10 mg oral tablet: 1 orally  Coreg 12.5 mg oral tablet: 1 tab(s) orally 2 times a day   fentaNYL 12 mcg/hr transdermal film, extended release: Apply topically to affected area every 72 hours  Flomax 0.4 mg oral capsule: 2 cap(s) orally once a day  Lasix 20 mg oral tablet: 1 orally once a day  Melatonin 3 mg oral tablet: 1 orally  Multiple Vitamins oral tablet: 1 orally once a day  Protonix 40 mg oral delayed release tablet: 1 tab(s) orally once a day  Retacrit 10,000 units/mL injectable solution: 50 intravenously   acetaminophen 325 mg oral tablet: 2 tab(s) orally every 6 hours, As needed, Temp greater or equal to 38C (100.4F), Mild Pain (1 - 3)  atorvastatin 10 mg oral tablet: 1 tab(s) orally once a day (at bedtime)  fentaNYL 12 mcg/hr transdermal film, extended release: Apply topically to affected area every 72 hours  Flomax 0.4 mg oral capsule: 2 cap(s) orally once a day  Melatonin 3 mg oral tablet: 1 orally  Multiple Vitamins oral tablet: 1 orally once a day  Protonix 40 mg oral delayed release tablet: 1 tab(s) orally once a day  Retacrit 10,000 units/mL injectable solution: 50 intravenously

## 2023-04-03 NOTE — DISCHARGE NOTE PROVIDER - CARE PROVIDER_API CALL
Chandana Oliver)  Urology  [address], [address]  [place] [address]  Phone: [phone]  Fax: [phone]  Follow Up Time: 1 week

## 2023-04-03 NOTE — PHYSICAL THERAPY INITIAL EVALUATION ADULT - PERTINENT HX OF CURRENT PROBLEM, REHAB EVAL
Pt is a 89 y/o male who presented from home with anemia and advanced prostate CA, malnutrition and anasarca.

## 2023-04-17 ENCOUNTER — EMERGENCY (EMERGENCY)
Facility: HOSPITAL | Age: 88
LOS: 1 days | Discharge: HOME | End: 2023-04-17
Attending: EMERGENCY MEDICINE
Payer: MEDICARE

## 2023-04-17 VITALS
WEIGHT: 250 LBS | HEART RATE: 24 BPM | DIASTOLIC BLOOD PRESSURE: 106 MMHG | OXYGEN SATURATION: 94 % | RESPIRATION RATE: 16 BRPM | HEIGHT: 69 IN | SYSTOLIC BLOOD PRESSURE: 143 MMHG

## 2023-04-17 VITALS
DIASTOLIC BLOOD PRESSURE: 57 MMHG | HEART RATE: 49 BPM | OXYGEN SATURATION: 98 % | RESPIRATION RATE: 12 BRPM | SYSTOLIC BLOOD PRESSURE: 117 MMHG

## 2023-04-17 DIAGNOSIS — Z95.0 PRESENCE OF CARDIAC PACEMAKER: Chronic | ICD-10-CM

## 2023-04-17 DIAGNOSIS — Z98.890 OTHER SPECIFIED POSTPROCEDURAL STATES: Chronic | ICD-10-CM

## 2023-04-17 DIAGNOSIS — Z98.49 CATARACT EXTRACTION STATUS, UNSPECIFIED EYE: Chronic | ICD-10-CM

## 2023-04-17 DIAGNOSIS — Z96.652 PRESENCE OF LEFT ARTIFICIAL KNEE JOINT: Chronic | ICD-10-CM

## 2023-04-17 DIAGNOSIS — Z90.49 ACQUIRED ABSENCE OF OTHER SPECIFIED PARTS OF DIGESTIVE TRACT: Chronic | ICD-10-CM

## 2023-04-17 DIAGNOSIS — Z90.79 ACQUIRED ABSENCE OF OTHER GENITAL ORGAN(S): Chronic | ICD-10-CM

## 2023-04-17 LAB
ACANTHOCYTES BLD QL SMEAR: SLIGHT — SIGNIFICANT CHANGE UP
ALBUMIN SERPL ELPH-MCNC: 3 G/DL — LOW (ref 3.3–5.2)
ALP SERPL-CCNC: 367 U/L — HIGH (ref 40–120)
ALT FLD-CCNC: 27 U/L — SIGNIFICANT CHANGE UP
ANION GAP SERPL CALC-SCNC: 19 MMOL/L — HIGH (ref 5–17)
ANISOCYTOSIS BLD QL: SLIGHT — SIGNIFICANT CHANGE UP
AST SERPL-CCNC: 101 U/L — HIGH
BASE EXCESS BLDV CALC-SCNC: -7.1 MMOL/L — LOW (ref -2–3)
BASOPHILS # BLD AUTO: 0.03 K/UL — SIGNIFICANT CHANGE UP (ref 0–0.2)
BASOPHILS NFR BLD AUTO: 0.9 % — SIGNIFICANT CHANGE UP (ref 0–2)
BILIRUB SERPL-MCNC: 0.7 MG/DL — SIGNIFICANT CHANGE UP (ref 0.4–2)
BLASTS # FLD: 0.9 % — HIGH (ref 0–0)
BLD GP AB SCN SERPL QL: SIGNIFICANT CHANGE UP
BUN SERPL-MCNC: 57.1 MG/DL — HIGH (ref 8–20)
BURR CELLS BLD QL SMEAR: PRESENT — SIGNIFICANT CHANGE UP
CA-I SERPL-SCNC: 1.02 MMOL/L — LOW (ref 1.15–1.33)
CALCIUM SERPL-MCNC: 7.8 MG/DL — LOW (ref 8.4–10.5)
CHLORIDE BLDV-SCNC: 111 MMOL/L — HIGH (ref 96–108)
CHLORIDE SERPL-SCNC: 109 MMOL/L — HIGH (ref 96–108)
CO2 SERPL-SCNC: 20 MMOL/L — LOW (ref 22–29)
CREAT SERPL-MCNC: 2.62 MG/DL — HIGH (ref 0.5–1.3)
DACRYOCYTES BLD QL SMEAR: SLIGHT — SIGNIFICANT CHANGE UP
EGFR: 22 ML/MIN/1.73M2 — LOW
ELLIPTOCYTES BLD QL SMEAR: SLIGHT — SIGNIFICANT CHANGE UP
EOSINOPHIL # BLD AUTO: 0 K/UL — SIGNIFICANT CHANGE UP (ref 0–0.5)
EOSINOPHIL NFR BLD AUTO: 0 % — SIGNIFICANT CHANGE UP (ref 0–6)
FLUAV AG NPH QL: SIGNIFICANT CHANGE UP
FLUBV AG NPH QL: SIGNIFICANT CHANGE UP
GAS PNL BLDV: 144 MMOL/L — SIGNIFICANT CHANGE UP (ref 136–145)
GAS PNL BLDV: SIGNIFICANT CHANGE UP
GIANT PLATELETS BLD QL SMEAR: PRESENT — SIGNIFICANT CHANGE UP
GLUCOSE BLDV-MCNC: 172 MG/DL — HIGH (ref 70–99)
GLUCOSE SERPL-MCNC: 174 MG/DL — HIGH (ref 70–99)
HCO3 BLDV-SCNC: 21 MMOL/L — LOW (ref 22–29)
HCT VFR BLD CALC: 25.3 % — LOW (ref 39–50)
HCT VFR BLDA CALC: 23 % — SIGNIFICANT CHANGE UP
HGB BLD CALC-MCNC: 7.6 G/DL — LOW (ref 12.6–17.4)
HGB BLD-MCNC: 7.1 G/DL — LOW (ref 13–17)
HYPOCHROMIA BLD QL: SLIGHT — SIGNIFICANT CHANGE UP
LACTATE BLDV-MCNC: 2.1 MMOL/L — HIGH (ref 0.5–2)
LIDOCAIN IGE QN: 13 U/L — LOW (ref 22–51)
LYMPHOCYTES # BLD AUTO: 0.81 K/UL — LOW (ref 1–3.3)
LYMPHOCYTES # BLD AUTO: 22.1 % — SIGNIFICANT CHANGE UP (ref 13–44)
MAGNESIUM SERPL-MCNC: 2.5 MG/DL — SIGNIFICANT CHANGE UP (ref 1.6–2.6)
MANUAL SMEAR VERIFICATION: SIGNIFICANT CHANGE UP
MCHC RBC-ENTMCNC: 27.4 PG — SIGNIFICANT CHANGE UP (ref 27–34)
MCHC RBC-ENTMCNC: 28.1 GM/DL — LOW (ref 32–36)
MCV RBC AUTO: 97.7 FL — SIGNIFICANT CHANGE UP (ref 80–100)
METAMYELOCYTES # FLD: 1.8 % — HIGH (ref 0–0)
MONOCYTES # BLD AUTO: 0 K/UL — SIGNIFICANT CHANGE UP (ref 0–0.9)
MONOCYTES NFR BLD AUTO: 0 % — LOW (ref 2–14)
MYELOCYTES NFR BLD: 8.8 % — HIGH (ref 0–0)
NEUTROPHILS # BLD AUTO: 2.18 K/UL — SIGNIFICANT CHANGE UP (ref 1.8–7.4)
NEUTROPHILS NFR BLD AUTO: 51.3 % — SIGNIFICANT CHANGE UP (ref 43–77)
NEUTS BAND # BLD: 8 % — SIGNIFICANT CHANGE UP (ref 0–8)
NRBC # BLD: 4 /100 — HIGH (ref 0–0)
OVALOCYTES BLD QL SMEAR: SLIGHT — SIGNIFICANT CHANGE UP
PCO2 BLDV: 52 MMHG — SIGNIFICANT CHANGE UP (ref 42–55)
PH BLDV: 7.21 — LOW (ref 7.32–7.43)
PLAT MORPH BLD: NORMAL — SIGNIFICANT CHANGE UP
PLATELET # BLD AUTO: 97 K/UL — LOW (ref 150–400)
PO2 BLDV: 80 MMHG — HIGH (ref 25–45)
POIKILOCYTOSIS BLD QL AUTO: SLIGHT — SIGNIFICANT CHANGE UP
POLYCHROMASIA BLD QL SMEAR: SLIGHT — SIGNIFICANT CHANGE UP
POTASSIUM BLDV-SCNC: 5.6 MMOL/L — HIGH (ref 3.5–5.1)
POTASSIUM SERPL-MCNC: 5.6 MMOL/L — HIGH (ref 3.5–5.3)
POTASSIUM SERPL-SCNC: 5.6 MMOL/L — HIGH (ref 3.5–5.3)
PROMYELOCYTES # FLD: 3.5 % — HIGH (ref 0–0)
PROT SERPL-MCNC: 6.8 G/DL — SIGNIFICANT CHANGE UP (ref 6.6–8.7)
RBC # BLD: 2.59 M/UL — LOW (ref 4.2–5.8)
RBC # FLD: 18.1 % — HIGH (ref 10.3–14.5)
RBC BLD AUTO: ABNORMAL
RSV RNA NPH QL NAA+NON-PROBE: SIGNIFICANT CHANGE UP
SAO2 % BLDV: 97.8 % — SIGNIFICANT CHANGE UP
SARS-COV-2 RNA SPEC QL NAA+PROBE: SIGNIFICANT CHANGE UP
SODIUM SERPL-SCNC: 148 MMOL/L — HIGH (ref 135–145)
TSH SERPL-MCNC: 0.34 UIU/ML — SIGNIFICANT CHANGE UP (ref 0.27–4.2)
VARIANT LYMPHS # BLD: 2.7 % — SIGNIFICANT CHANGE UP (ref 0–6)
WBC # BLD: 3.68 K/UL — LOW (ref 3.8–10.5)
WBC # FLD AUTO: 3.68 K/UL — LOW (ref 3.8–10.5)

## 2023-04-17 PROCEDURE — 86850 RBC ANTIBODY SCREEN: CPT

## 2023-04-17 PROCEDURE — 85025 COMPLETE CBC W/AUTO DIFF WBC: CPT

## 2023-04-17 PROCEDURE — 87040 BLOOD CULTURE FOR BACTERIA: CPT

## 2023-04-17 PROCEDURE — 82330 ASSAY OF CALCIUM: CPT

## 2023-04-17 PROCEDURE — 80053 COMPREHEN METABOLIC PANEL: CPT

## 2023-04-17 PROCEDURE — 82803 BLOOD GASES ANY COMBINATION: CPT

## 2023-04-17 PROCEDURE — 86900 BLOOD TYPING SEROLOGIC ABO: CPT

## 2023-04-17 PROCEDURE — 82435 ASSAY OF BLOOD CHLORIDE: CPT

## 2023-04-17 PROCEDURE — 96375 TX/PRO/DX INJ NEW DRUG ADDON: CPT

## 2023-04-17 PROCEDURE — 99285 EMERGENCY DEPT VISIT HI MDM: CPT | Mod: 25

## 2023-04-17 PROCEDURE — 83735 ASSAY OF MAGNESIUM: CPT

## 2023-04-17 PROCEDURE — 84295 ASSAY OF SERUM SODIUM: CPT

## 2023-04-17 PROCEDURE — 83605 ASSAY OF LACTIC ACID: CPT

## 2023-04-17 PROCEDURE — 82947 ASSAY GLUCOSE BLOOD QUANT: CPT

## 2023-04-17 PROCEDURE — 99285 EMERGENCY DEPT VISIT HI MDM: CPT | Mod: CS

## 2023-04-17 PROCEDURE — 96361 HYDRATE IV INFUSION ADD-ON: CPT

## 2023-04-17 PROCEDURE — 96374 THER/PROPH/DIAG INJ IV PUSH: CPT

## 2023-04-17 PROCEDURE — 87077 CULTURE AEROBIC IDENTIFY: CPT

## 2023-04-17 PROCEDURE — 83690 ASSAY OF LIPASE: CPT

## 2023-04-17 PROCEDURE — 84132 ASSAY OF SERUM POTASSIUM: CPT

## 2023-04-17 PROCEDURE — 85014 HEMATOCRIT: CPT

## 2023-04-17 PROCEDURE — 84443 ASSAY THYROID STIM HORMONE: CPT

## 2023-04-17 PROCEDURE — 36415 COLL VENOUS BLD VENIPUNCTURE: CPT

## 2023-04-17 PROCEDURE — 87150 DNA/RNA AMPLIFIED PROBE: CPT

## 2023-04-17 PROCEDURE — 87637 SARSCOV2&INF A&B&RSV AMP PRB: CPT

## 2023-04-17 PROCEDURE — 86901 BLOOD TYPING SEROLOGIC RH(D): CPT

## 2023-04-17 PROCEDURE — 85018 HEMOGLOBIN: CPT

## 2023-04-17 RX ORDER — SODIUM CHLORIDE 9 MG/ML
250 INJECTION INTRAMUSCULAR; INTRAVENOUS; SUBCUTANEOUS ONCE
Refills: 0 | Status: DISCONTINUED | OUTPATIENT
Start: 2023-04-17 | End: 2023-04-24

## 2023-04-17 RX ORDER — PIPERACILLIN AND TAZOBACTAM 4; .5 G/20ML; G/20ML
3.38 INJECTION, POWDER, LYOPHILIZED, FOR SOLUTION INTRAVENOUS ONCE
Refills: 0 | Status: COMPLETED | OUTPATIENT
Start: 2023-04-17 | End: 2023-04-17

## 2023-04-17 RX ORDER — SODIUM CHLORIDE 9 MG/ML
250 INJECTION INTRAMUSCULAR; INTRAVENOUS; SUBCUTANEOUS ONCE
Refills: 0 | Status: COMPLETED | OUTPATIENT
Start: 2023-04-17 | End: 2023-04-17

## 2023-04-17 RX ORDER — VANCOMYCIN HCL 1 G
1000 VIAL (EA) INTRAVENOUS ONCE
Refills: 0 | Status: DISCONTINUED | OUTPATIENT
Start: 2023-04-17 | End: 2023-04-17

## 2023-04-17 RX ORDER — FENTANYL CITRATE 50 UG/ML
50 INJECTION INTRAVENOUS ONCE
Refills: 0 | Status: DISCONTINUED | OUTPATIENT
Start: 2023-04-17 | End: 2023-04-17

## 2023-04-17 RX ADMIN — SODIUM CHLORIDE 250 MILLILITER(S): 9 INJECTION INTRAMUSCULAR; INTRAVENOUS; SUBCUTANEOUS at 07:06

## 2023-04-17 RX ADMIN — FENTANYL CITRATE 50 MICROGRAM(S): 50 INJECTION INTRAVENOUS at 08:56

## 2023-04-17 RX ADMIN — PIPERACILLIN AND TAZOBACTAM 200 GRAM(S): 4; .5 INJECTION, POWDER, LYOPHILIZED, FOR SOLUTION INTRAVENOUS at 07:05

## 2023-04-17 NOTE — GOALS OF CARE CONVERSATION - ADVANCED CARE PLANNING - CONVERSATION DETAILS
Pt with stage IV cancer, patient with progressive decline over last few weeks. Daughter at bedside, discussed with Wife over the phone. Want to make him comfortable and don't want to prolong his suffering. Pt to be DNI/DNR, discussed to not proceed with invasive testing including no more imaging. Wife wants him sent back to NH so she can be with him. Case Management consulted to plan for hospice care.

## 2023-04-17 NOTE — ED PROVIDER NOTE - PHYSICAL EXAMINATION
Gen: opens eyes to name, shakes head but not answering questions/following commands, chornically ill appearing   Head: NCAT  HEENT: EOMI, oral mucosa dry, normal conjunctiva, neck supple  Lung: CTAB, no respiratory distress  CV: laura regular, no murmur, Normal perfusion  Abd: soft, NTND  MSK: diffuse anasarca, no visible deformities  Neuro: EOMI, no noted facial droop, unable to follow commands, withdraws to pain in all 4 extremities   Skin: No rash   Psych: unable to asses

## 2023-04-17 NOTE — ED PROVIDER NOTE - CLINICAL SUMMARY MEDICAL DECISION MAKING FREE TEXT BOX
89yo M with metastatic CA with progressive decline presenting with AMS- likely metabolic/infectious in nature. initial paperwork with full code, initial resusitation started with fluids/antibiotics/ekg. daughter arrived and discussed with wife LAI, would like to discharge back to NH/hospice. no further invasive workup. patient is DNR/DNI with MOLST completed and in chart. CM consulted for hospice. labs with baseline anemia, TI, given fluids. mild hyperK. at this time treatment plan is comfort measures. will not change oneal. no imaging.

## 2023-04-17 NOTE — ED ADULT NURSE NOTE - OBJECTIVE STATEMENT
assumed care of pt from triage, pt alert to voice, resp. even and unlabored on NC @ 3lpm, pt BIBEMS laura in the 40's, pt placed on CM//pads, pt comes from [facility] and staff reports AMS and lethargy, pt hx of anasarca, as per NH staff pt usually verbal, chronic oneal in place, bilat LE/UE pitting edema, MD called to bedside, antibiotics and fluids started per MD order, pt full code, EKG done at bedside

## 2023-04-17 NOTE — ED PROVIDER NOTE - CARE PLAN
1 Principal Discharge DX:	Acute respiratory failure with hypoxia  Secondary Diagnosis:	TI (acute kidney injury)

## 2023-04-17 NOTE — ED ADULT NURSE NOTE - CHIEF COMPLAINT QUOTE
BIBEMS from [facility] c/o unresponsiveness and bradycardia. Per EMS, patient was found unresponsive while attempting to administer daily medications. At triage, patient unable to talk, responsive to painful stimuli, 4+ pitting edema in all extremities. Patient bought to CC area of ED for evaluation/intervention. EKG in progress.

## 2023-04-17 NOTE — ED ADULT TRIAGE NOTE - CHIEF COMPLAINT QUOTE
BIBEMS from [facility] c/o unresponsiveness and bradycardia. Per EMS, patient was found unresponsive while attempting to administer daily medications. At triage, patient unable to talk, responsive to painful stimuli, 4+ pitting edema in all extremities. Patient bought to CC area of ED for evaluation/intervention. EKG in progress.

## 2023-04-17 NOTE — ED ADULT NURSE REASSESSMENT NOTE - NS ED NURSE REASSESS COMMENT FT1
Pt placed in hospital bed turned and repositioned, Daughter at bedside, discharge planning RN at bedside.

## 2023-04-17 NOTE — ED PROVIDER NOTE - OBJECTIVE STATEMENT
89yo M with anemia, metastatic prostate/colon CA, chronic oneal, progressive decline over last few weeks. Today more unresponsive with trouble breathing per EMS, unsure baseline. noted to be bradycardic en route. opens eyes to name but unable to give other history. Daughter states patient was walking talking but not in last month or so and progressively declining, has not been himself last 2 days. had a recent UTI was on abx.

## 2023-04-20 LAB
-  STAPHYLOCOCCUS EPIDERMIDIS, METHICILLIN RESISTANT: SIGNIFICANT CHANGE UP
CULTURE RESULTS: SIGNIFICANT CHANGE UP
GRAM STN FLD: SIGNIFICANT CHANGE UP
METHOD TYPE: SIGNIFICANT CHANGE UP
ORGANISM # SPEC MICROSCOPIC CNT: SIGNIFICANT CHANGE UP
ORGANISM # SPEC MICROSCOPIC CNT: SIGNIFICANT CHANGE UP
SPECIMEN SOURCE: SIGNIFICANT CHANGE UP

## 2023-04-20 PROCEDURE — 92526 ORAL FUNCTION THERAPY: CPT

## 2023-04-20 PROCEDURE — 85014 HEMATOCRIT: CPT

## 2023-04-20 PROCEDURE — U0003: CPT

## 2023-04-20 PROCEDURE — 80048 BASIC METABOLIC PNL TOTAL CA: CPT

## 2023-04-20 PROCEDURE — 87086 URINE CULTURE/COLONY COUNT: CPT

## 2023-04-20 PROCEDURE — 82272 OCCULT BLD FECES 1-3 TESTS: CPT

## 2023-04-20 PROCEDURE — 71250 CT THORAX DX C-: CPT

## 2023-04-20 PROCEDURE — 86923 COMPATIBILITY TEST ELECTRIC: CPT

## 2023-04-20 PROCEDURE — 74174 CTA ABD&PLVS W/CONTRAST: CPT

## 2023-04-20 PROCEDURE — 85027 COMPLETE CBC AUTOMATED: CPT

## 2023-04-20 PROCEDURE — 99291 CRITICAL CARE FIRST HOUR: CPT | Mod: 25

## 2023-04-20 PROCEDURE — 83615 LACTATE (LD) (LDH) ENZYME: CPT

## 2023-04-20 PROCEDURE — P9016: CPT

## 2023-04-20 PROCEDURE — 83605 ASSAY OF LACTIC ACID: CPT

## 2023-04-20 PROCEDURE — 36600 WITHDRAWAL OF ARTERIAL BLOOD: CPT

## 2023-04-20 PROCEDURE — 85025 COMPLETE CBC W/AUTO DIFF WBC: CPT

## 2023-04-20 PROCEDURE — 36415 COLL VENOUS BLD VENIPUNCTURE: CPT

## 2023-04-20 PROCEDURE — 84295 ASSAY OF SERUM SODIUM: CPT

## 2023-04-20 PROCEDURE — 96374 THER/PROPH/DIAG INJ IV PUSH: CPT

## 2023-04-20 PROCEDURE — U0005: CPT

## 2023-04-20 PROCEDURE — 86900 BLOOD TYPING SEROLOGIC ABO: CPT

## 2023-04-20 PROCEDURE — 82330 ASSAY OF CALCIUM: CPT

## 2023-04-20 PROCEDURE — 86850 RBC ANTIBODY SCREEN: CPT

## 2023-04-20 PROCEDURE — 84443 ASSAY THYROID STIM HORMONE: CPT

## 2023-04-20 PROCEDURE — 80307 DRUG TEST PRSMV CHEM ANLYZR: CPT

## 2023-04-20 PROCEDURE — 82435 ASSAY OF BLOOD CHLORIDE: CPT

## 2023-04-20 PROCEDURE — 87040 BLOOD CULTURE FOR BACTERIA: CPT

## 2023-04-20 PROCEDURE — 36430 TRANSFUSION BLD/BLD COMPNT: CPT

## 2023-04-20 PROCEDURE — 85045 AUTOMATED RETICULOCYTE COUNT: CPT

## 2023-04-20 PROCEDURE — 83540 ASSAY OF IRON: CPT

## 2023-04-20 PROCEDURE — 72192 CT PELVIS W/O DYE: CPT

## 2023-04-20 PROCEDURE — 84484 ASSAY OF TROPONIN QUANT: CPT

## 2023-04-20 PROCEDURE — 0225U NFCT DS DNA&RNA 21 SARSCOV2: CPT

## 2023-04-20 PROCEDURE — 85384 FIBRINOGEN ACTIVITY: CPT

## 2023-04-20 PROCEDURE — 80053 COMPREHEN METABOLIC PANEL: CPT

## 2023-04-20 PROCEDURE — 87641 MR-STAPH DNA AMP PROBE: CPT

## 2023-04-20 PROCEDURE — 85610 PROTHROMBIN TIME: CPT

## 2023-04-20 PROCEDURE — 84466 ASSAY OF TRANSFERRIN: CPT

## 2023-04-20 PROCEDURE — 84132 ASSAY OF SERUM POTASSIUM: CPT

## 2023-04-20 PROCEDURE — 71045 X-RAY EXAM CHEST 1 VIEW: CPT

## 2023-04-20 PROCEDURE — 86901 BLOOD TYPING SEROLOGIC RH(D): CPT

## 2023-04-20 PROCEDURE — 85730 THROMBOPLASTIN TIME PARTIAL: CPT

## 2023-04-20 PROCEDURE — 82803 BLOOD GASES ANY COMBINATION: CPT

## 2023-04-20 PROCEDURE — 87640 STAPH A DNA AMP PROBE: CPT

## 2023-04-20 PROCEDURE — 83735 ASSAY OF MAGNESIUM: CPT

## 2023-04-20 PROCEDURE — 82728 ASSAY OF FERRITIN: CPT

## 2023-04-20 PROCEDURE — 85018 HEMOGLOBIN: CPT

## 2023-04-20 PROCEDURE — 70450 CT HEAD/BRAIN W/O DYE: CPT

## 2023-04-20 PROCEDURE — 82947 ASSAY GLUCOSE BLOOD QUANT: CPT

## 2023-04-20 PROCEDURE — 93970 EXTREMITY STUDY: CPT

## 2023-04-20 PROCEDURE — 83550 IRON BINDING TEST: CPT

## 2023-04-20 PROCEDURE — P9040: CPT

## 2023-04-20 PROCEDURE — 93005 ELECTROCARDIOGRAM TRACING: CPT

## 2023-04-20 PROCEDURE — 81001 URINALYSIS AUTO W/SCOPE: CPT

## 2023-04-20 NOTE — ED POST DISCHARGE NOTE - DETAILS
daughter contacted and told to have patient return daughter contacted stated patient currently on hospice in [facility], called [facility] and told results to team taking care of patient

## 2023-04-22 LAB
CULTURE RESULTS: SIGNIFICANT CHANGE UP
SPECIMEN SOURCE: SIGNIFICANT CHANGE UP

## 2023-05-08 NOTE — DIETITIAN INITIAL EVALUATION ADULT - ETIOLOGY
related to inability to meet sufficient protein-energy needs in setting of metastatic prostate CA, advanced age, dysphagia Quality 110: Preventive Care And Screening: Influenza Immunization: Influenza immunization was not ordered or administered, reason not given Detail Level: Detailed

## 2023-11-07 NOTE — ED PROVIDER NOTE - NSCAREINITIATED _GEN_ER
ASC Screening    ASC Screening  BMI > than 45: No  Are you currently pregnant?: No  Do you rely on a wheelchair for mobility?: No  Do you need oxygen during the day?: No  Have you ever been informed by anesthesia that you have a difficult airway?: No  Have you been diagnosed with End Stage Renal Disease (ESRD)?: No  Are you actively on dialysis?: No  Have you been diagnosed with Pulmonary Hypertension?: No  Do you have a pacemaker or an Automatic Implantable Cardioverter Defibrillator (AICD)?: No  Have you ever had an organ transplant?: No  Have you had a stroke, heart attack, myocardial infarction (MI) within the last 6 months?: No  Have you ever been diagnosed with Aortic Stenosis?: No  Have you ever been diagnosed  with Congestive Heart Failure?: No  Have you ever been diagnosed with a heart valve disease?: No  Are you Diabetic?: No  If you are Diabetic, has your A1C been greater than 12 within the last six months?: N/A
Scheduled date of colonoscopy (as of today):  12-   Physician performing colonoscopy: Dr. Summers  Location of colonoscopy: [address]  Bowel prep reviewed with patient: reviewed miralax dulcolax. Please send a copy to home address.  (Verified)  Instructions reviewed with patient by: JUANITA  Clearances: N/A
Monica Dias(Attending)

## 2024-05-28 NOTE — H&P ADULT - PROBLEM SELECTOR PLAN 1
- likely multifactorial, no evidence of active GI bleed as per GI   possible bone marrow involvement from prostate cancer and mets to bones.  Transfuse 2 units of prbc  will keep on iv protonix  follow cbc after 2 units, goal to keep Hb above7 or close to 8. ADMIT

## 2024-10-24 NOTE — ED ADULT NURSE NOTE - TEMPLATE LIST FOR HEAD TO TOE ASSESSMENT
Winlevi Pregnancy And Lactation Text: This medication is considered safe during pregnancy and breastfeeding. Azithromycin Counseling:  I discussed with the patient the risks of azithromycin including but not limited to GI upset, allergic reaction, drug rash, diarrhea, and yeast infections. Tetracycline Pregnancy And Lactation Text: This medication is Pregnancy Category D and not consider safe during pregnancy. It is also excreted in breast milk. Minocycline Counseling: Patient advised regarding possible photosensitivity and discoloration of the teeth, skin, lips, tongue and gums.  Patient instructed to avoid sunlight, if possible.  When exposed to sunlight, patients should wear protective clothing, sunglasses, and sunscreen.  The patient was instructed to call the office immediately if the following severe adverse effects occur:  hearing changes, easy bruising/bleeding, severe headache, or vision changes.  The patient verbalized understanding of the proper use and possible adverse effects of minocycline.  All of the patient's questions and concerns were addressed. Cardiac Topical Retinoid counseling:  Patient advised to apply a pea-sized amount only at bedtime and wait 30 minutes after washing their face before applying.  If too drying, patient may add a non-comedogenic moisturizer. The patient verbalized understanding of the proper use and possible adverse effects of retinoids.  All of the patient's questions and concerns were addressed. Use Enhanced Medication Counseling?: No Dapsone Pregnancy And Lactation Text: This medication is Pregnancy Category C and is not considered safe during pregnancy or breast feeding. Erythromycin Pregnancy And Lactation Text: This medication is Pregnancy Category B and is considered safe during pregnancy. It is also excreted in breast milk. Aklief Pregnancy And Lactation Text: It is unknown if this medication is safe to use during pregnancy.  It is unknown if this medication is excreted in breast milk.  Breastfeeding women should use the topical cream on the smallest area of the skin for the shortest time needed while breastfeeding.  Do not apply to nipple and areola. Sarecycline Counseling: Patient advised regarding possible photosensitivity and discoloration of the teeth, skin, lips, tongue and gums.  Patient instructed to avoid sunlight, if possible.  When exposed to sunlight, patients should wear protective clothing, sunglasses, and sunscreen.  The patient was instructed to call the office immediately if the following severe adverse effects occur:  hearing changes, easy bruising/bleeding, severe headache, or vision changes.  The patient verbalized understanding of the proper use and possible adverse effects of sarecycline.  All of the patient's questions and concerns were addressed. Tazorac Counseling:  Patient advised that medication is irritating and drying.  Patient may need to apply sparingly and wash off after an hour before eventually leaving it on overnight.  The patient verbalized understanding of the proper use and possible adverse effects of tazorac.  All of the patient's questions and concerns were addressed. Doxycycline Pregnancy And Lactation Text: This medication is Pregnancy Category D and not consider safe during pregnancy. It is also excreted in breast milk but is considered safe for shorter treatment courses. Bactrim Counseling:  I discussed with the patient the risks of sulfa antibiotics including but not limited to GI upset, allergic reaction, drug rash, diarrhea, dizziness, photosensitivity, and yeast infections.  Rarely, more serious reactions can occur including but not limited to aplastic anemia, agranulocytosis, methemoglobinemia, blood dyscrasias, liver or kidney failure, lung infiltrates or desquamative/blistering drug rashes. Tazorac Pregnancy And Lactation Text: This medication is not safe during pregnancy. It is unknown if this medication is excreted in breast milk. Topical Sulfur Applications Pregnancy And Lactation Text: This medication is Pregnancy Category C and has an unknown safety profile during pregnancy. It is unknown if this topical medication is excreted in breast milk. Erythromycin Counseling:  I discussed with the patient the risks of erythromycin including but not limited to GI upset, allergic reaction, drug rash, diarrhea, increase in liver enzymes, and yeast infections. Bactrim Pregnancy And Lactation Text: This medication is Pregnancy Category D and is known to cause fetal risk.  It is also excreted in breast milk. Spironolactone Pregnancy And Lactation Text: This medication can cause feminization of the male fetus and should be avoided during pregnancy. The active metabolite is also found in breast milk. High Dose Vitamin A Counseling: Side effects reviewed, pt to contact office should one occur. Benzoyl Peroxide Counseling: Patient counseled that medicine may cause skin irritation and bleach clothing.  In the event of skin irritation, the patient was advised to reduce the amount of the drug applied or use it less frequently.   The patient verbalized understanding of the proper use and possible adverse effects of benzoyl peroxide.  All of the patient's questions and concerns were addressed. Birth Control Pills Pregnancy And Lactation Text: This medication should be avoided if pregnant and for the first 30 days post-partum. Detail Level: Detailed Aklief counseling:  Patient advised to apply a pea-sized amount only at bedtime and wait 30 minutes after washing their face before applying.  If too drying, patient may add a non-comedogenic moisturizer.  The most commonly reported side effects including irritation, redness, scaling, dryness, stinging, burning, itching, and increased risk of sunburn.  The patient verbalized understanding of the proper use and possible adverse effects of retinoids.  All of the patient's questions and concerns were addressed. Doxycycline Counseling:  Patient counseled regarding possible photosensitivity and increased risk for sunburn.  Patient instructed to avoid sunlight, if possible.  When exposed to sunlight, patients should wear protective clothing, sunglasses, and sunscreen.  The patient was instructed to call the office immediately if the following severe adverse effects occur:  hearing changes, easy bruising/bleeding, severe headache, or vision changes.  The patient verbalized understanding of the proper use and possible adverse effects of doxycycline.  All of the patient's questions and concerns were addressed. Azithromycin Pregnancy And Lactation Text: This medication is considered safe during pregnancy and is also secreted in breast milk. Topical Retinoid Pregnancy And Lactation Text: This medication is Pregnancy Category C. It is unknown if this medication is excreted in breast milk. Topical Clindamycin Pregnancy And Lactation Text: This medication is Pregnancy Category B and is considered safe during pregnancy. It is unknown if it is excreted in breast milk. Isotretinoin Counseling: Patient should get monthly blood tests, not donate blood, not drive at night if vision affected, not share medication, and not undergo elective surgery for 6 months after tx completed. Side effects reviewed, pt to contact office should one occur. Azelaic Acid Counseling: Patient counseled that medicine may cause skin irritation and to avoid applying near the eyes.  In the event of skin irritation, the patient was advised to reduce the amount of the drug applied or use it less frequently.   The patient verbalized understanding of the proper use and possible adverse effects of azelaic acid.  All of the patient's questions and concerns were addressed. Isotretinoin Pregnancy And Lactation Text: This medication is Pregnancy Category X and is considered extremely dangerous during pregnancy. It is unknown if it is excreted in breast milk. Spironolactone Counseling: Patient advised regarding risks of diarrhea, abdominal pain, hyperkalemia, birth defects (for female patients), liver toxicity and renal toxicity. The patient may need blood work to monitor liver and kidney function and potassium levels while on therapy. The patient verbalized understanding of the proper use and possible adverse effects of spironolactone.  All of the patient's questions and concerns were addressed. Topical Sulfur Applications Counseling: Topical Sulfur Counseling: Patient counseled that this medication may cause skin irritation or allergic reactions.  In the event of skin irritation, the patient was advised to reduce the amount of the drug applied or use it less frequently.   The patient verbalized understanding of the proper use and possible adverse effects of topical sulfur application.  All of the patient's questions and concerns were addressed. Birth Control Pills Counseling: Birth Control Pill Counseling: I discussed with the patient the potential side effects of OCPs including but not limited to increased risk of stroke, heart attack, thrombophlebitis, deep venous thrombosis, hepatic adenomas, breast changes, GI upset, headaches, and depression.  The patient verbalized understanding of the proper use and possible adverse effects of OCPs. All of the patient's questions and concerns were addressed. Azelaic Acid Pregnancy And Lactation Text: This medication is considered safe during pregnancy and breast feeding. Topical Clindamycin Counseling: Patient counseled that this medication may cause skin irritation or allergic reactions.  In the event of skin irritation, the patient was advised to reduce the amount of the drug applied or use it less frequently.   The patient verbalized understanding of the proper use and possible adverse effects of clindamycin.  All of the patient's questions and concerns were addressed. Winlevi Counseling:  I discussed with the patient the risks of topical clascoterone including but not limited to erythema, scaling, itching, and stinging. Patient voiced their understanding. Benzoyl Peroxide Pregnancy And Lactation Text: This medication is Pregnancy Category C. It is unknown if benzoyl peroxide is excreted in breast milk. High Dose Vitamin A Pregnancy And Lactation Text: High dose vitamin A therapy is contraindicated during pregnancy and breast feeding. Tetracycline Counseling: Patient counseled regarding possible photosensitivity and increased risk for sunburn.  Patient instructed to avoid sunlight, if possible.  When exposed to sunlight, patients should wear protective clothing, sunglasses, and sunscreen.  The patient was instructed to call the office immediately if the following severe adverse effects occur:  hearing changes, easy bruising/bleeding, severe headache, or vision changes.  The patient verbalized understanding of the proper use and possible adverse effects of tetracycline.  All of the patient's questions and concerns were addressed. Patient understands to avoid pregnancy while on therapy due to potential birth defects. Dapsone Counseling: I discussed with the patient the risks of dapsone including but not limited to hemolytic anemia, agranulocytosis, rashes, methemoglobinemia, kidney failure, peripheral neuropathy, headaches, GI upset, and liver toxicity.  Patients who start dapsone require monitoring including baseline LFTs and weekly CBCs for the first month, then every month thereafter.  The patient verbalized understanding of the proper use and possible adverse effects of dapsone.  All of the patient's questions and concerns were addressed.

## 2025-05-27 NOTE — PROVIDER CONTACT NOTE (OTHER) - NAME OF MD/NP/PA/DO NOTIFIED:
Manolo Bashir is a 68 y.o. male on day 2 of admission presenting with Anemia, unspecified type.      Subjective   Pt seen this morning, reports that he has chronic right shoulder pain, otherwise no active complaints, had BM yesterday  No acute events overnight        Objective     Last Recorded Vitals  /69 (BP Location: Right arm, Patient Position: Lying)   Pulse 78   Temp 37.2 °C (99 °F) (Temporal)   Resp 16   Wt 65 kg (143 lb 4.8 oz)   SpO2 100%   Intake/Output last 3 Shifts:    Intake/Output Summary (Last 24 hours) at 5/27/2025 0729  Last data filed at 5/26/2025 2030  Gross per 24 hour   Intake 1480 ml   Output 2400 ml   Net -920 ml       Admission Weight  Weight: 63.5 kg (140 lb) (05/25/25 1708)    Daily Weight  05/25/25 : 65 kg (143 lb 4.8 oz)    Image Results  ECG 12 lead  Normal sinus rhythm  Right axis deviation  Nonspecific intraventricular block  T wave abnormality, consider inferior ischemia  Abnormal ECG  When compared with ECG of 21-MAY-2025 00:23,  Questionable change in QRS axis  Minimal criteria for Anterior infarct are no longer Present  T wave inversion now evident in Inferior leads  T wave inversion now evident in Lateral leads    See ED provider note for full interpretation and clinical correlation  Confirmed by Sil Clemons ([id number]) on 5/25/2025 11:10:28 PM      Physical Exam  General: awake, alert, conversant, appears stated age  HEENT: pupils equal and round, no scleral icterus  Skin: no suspect lesions or rashes noted on visible skin  Chest: ctab, normal respiratory effort, not on supplemental oxygen. R chest tunneled dialysis line present.  Cardiac: regular rate, normal s1, s2, no M/R/G  Abdomen: soft, ND, NT, no involuntary guarding. Midline surgical scar present.  : no flank pain or indwelling urinary catheter  EXT: no peripheral edema, LUE fistula with palpable thrill  MSK: no focal joint swelling noted  Neuro: AOx4, moving all limbs spontaneously, follows commands  Psych:  Lachelle coherent thought process, appropriate mood and affect              Results for orders placed or performed during the hospital encounter of 05/25/25 (from the past 24 hours)   Tacrolimus level   Result Value Ref Range    Tacrolimus  <2.0 <=15.0 ng/mL   Transferrin   Result Value Ref Range    Transferrin 85 (L) 200 - 360 mg/dL   Magnesium   Result Value Ref Range    Magnesium 1.87 1.60 - 2.40 mg/dL   Comprehensive metabolic panel   Result Value Ref Range    Glucose 58 (L) 74 - 99 mg/dL    Sodium 137 136 - 145 mmol/L    Potassium 4.9 3.5 - 5.3 mmol/L    Chloride 101 98 - 107 mmol/L    Bicarbonate 26 21 - 32 mmol/L    Anion Gap 15 10 - 20 mmol/L    Urea Nitrogen 26 (H) 6 - 23 mg/dL    Creatinine 10.02 (H) 0.50 - 1.30 mg/dL    eGFR 5 (L) >60 mL/min/1.73m*2    Calcium 9.4 8.6 - 10.6 mg/dL    Albumin 2.8 (L) 3.4 - 5.0 g/dL    Alkaline Phosphatase 73 33 - 136 U/L    Total Protein 6.3 (L) 6.4 - 8.2 g/dL    AST 16 9 - 39 U/L    Bilirubin, Total 0.4 0.0 - 1.2 mg/dL    ALT 16 10 - 52 U/L   POCT GLUCOSE   Result Value Ref Range    POCT Glucose 103 (H) 74 - 99 mg/dL   POCT GLUCOSE   Result Value Ref Range    POCT Glucose 78 74 - 99 mg/dL   Type and screen   Result Value Ref Range    ABO TYPE O     Rh TYPE POS     ANTIBODY SCREEN NEG    CBC   Result Value Ref Range    WBC 5.3 4.4 - 11.3 x10*3/uL    nRBC 0.0 0.0 - 0.0 /100 WBCs    RBC 2.80 (L) 4.50 - 5.90 x10*6/uL    Hemoglobin 7.5 (L) 13.5 - 17.5 g/dL    Hematocrit 24.3 (L) 41.0 - 52.0 %    MCV 87 80 - 100 fL    MCH 26.8 26.0 - 34.0 pg    MCHC 30.9 (L) 32.0 - 36.0 g/dL    RDW 14.3 11.5 - 14.5 %    Platelets 112 (L) 150 - 450 x10*3/uL   POCT GLUCOSE   Result Value Ref Range    POCT Glucose 79 74 - 99 mg/dL   POCT GLUCOSE   Result Value Ref Range    POCT Glucose 142 (H) 74 - 99 mg/dL     *Note: Due to a large number of results and/or encounters for the requested time period, some results have not been displayed. A complete set of results can be found in Results Review.      ECG 12  lead  Result Date: 5/25/2025  Normal sinus rhythm Right axis deviation Nonspecific intraventricular block T wave abnormality, consider inferior ischemia Abnormal ECG When compared with ECG of 21-MAY-2025 00:23, Questionable change in QRS axis Minimal criteria for Anterior infarct are no longer Present T wave inversion now evident in Inferior leads T wave inversion now evident in Lateral leads See ED provider note for full interpretation and clinical correlation Confirmed by Sil Clemons ([id number]) on 5/25/2025 11:10:28 PM    CT shoulder right wo IV contrast  Result Date: 5/21/2025  Interpreted By:  Pipe Cunningham and Sheng Max STUDY: CT SHOULDER RIGHT WO IV CONTRAST;  5/21/2025 7:02 am   INDICATION: Signs/Symptoms:Eval for shoulder dislocation.     COMPARISON: XR SHOULDER RIGHT 2+ VIEWS 5/20/2025   ACCESSION NUMBER(S): [id number]   ORDERING CLINICIAN: CHERIE FANG   TECHNIQUE: CT imaging of the  right shoulder was obtained. Coronal and sagittal reformatted images were performed.   FINDINGS: OSSEOUS STRUCTURES:   Acute oblique comminuted fracture through the right coracoid process (Series 202, Image 37) (Series 208, Image 65). There is lucency at the base of the coracoid process.   Acute impaction fracture involving the anteromedial humeral head (Series 202, Image 54).   Mild osteoarthrosis of the acromioclavicular joint. Os acromiale. Chondrocalcinosis. There is scalloping of the acromial undersurface.   SOFT TISSUES:   Moderate right glenohumeral hemarthrosis. No retained radiopaque foreign bodies.   Partially imaged right internal jugular approach central venous catheter tip terminates within the right atrium. There are scattered airspace opacities throughout the partially imaged right upper and right lower lobes which may be sequela of recent pneumonitis. Mosaic attenuation throughout the right lung is likely sequela of small-vessel/airway disease. Moderate right pleural effusion.   Soft tissue edema about the  shoulder.       1. Acute oblique comminuted fracture through the right coracoid process. This is favored to be a pathologic fracture given lucency at the base of the coracoid. This may reflecting sequela of brown tumor in the setting of end-stage renal disease, scalloping from longstanding hemarthrosis, or neoplasm. MRI would be of value for further characterization. 2. Findings favoring acute impaction fracture involving the anteromedial humeral head. 3. Moderate right glenohumeral hemarthrosis. Soft tissue edema about the shoulder. 4. Pneumonitis involving the right upper and right lower lobes. 5. Moderate right pleural effusion.   I personally reviewed the images/study and I agree with the findings as stated by Dr. Mitchell Da Silva. This study was interpreted at [place], [place].   MACRO: None   Signed by: Pipe Cunningham 5/21/2025 7:53 AM Dictation workstation:   [id number]    XR chest 1 view  Result Date: 5/21/2025  Interpreted By:  Talon Frazier and Sheng Max STUDY: XR CHEST 1 VIEW;  5/21/2025 12:33 am   INDICATION: Signs/Symptoms:preop.   COMPARISON: CT ABDOMEN PELVIS WO IV CONTRAST 4/14/2025, XR CHEST 1 VIEW 4/14/2025   ACCESSION NUMBER(S): [id number]   ORDERING CLINICIAN: SHA VANG   FINDINGS: AP radiograph of the chest:   LINES AND DEVICES: Right internal jugular approach central venous catheter tip projects over the cavoatrial junction. Left axillary stent is noted with similar kinking in the mid axillary stent.   CARDIOMEDIASTINAL SILHOUETTE: Stable enlargement of the cardiomediastinal silhouette.   LUNGS: Improved bilateral perihilar congestion and interstitial prominence. No focal consolidation, pleural effusion or pneumothorax.   ABDOMEN: No remarkable upper abdominal findings.   BONES: No acute osseous abnormality.       1. Improved bilateral perihilar congestion and pulmonary edema. Otherwise no acute cardiopulmonary process. 2. Medical devices as  detailed above.   I personally reviewed the images/study and I agree with the findings as stated by Dr. Mitchell Da Silva. This study was interpreted at [facility], [place], [place].   MACRO: None   Signed by: Talon Frazier 5/21/2025 1:21 AM Dictation workstation:   [id number]    ECG 12 Lead  Result Date: 5/21/2025  Normal sinus rhythm Right superior axis deviation Nonspecific intraventricular block Minimal voltage criteria for LVH, may be normal variant ( [place] product ) Cannot rule out Anterior infarct (cited on or before 14-APR-2025) Abnormal ECG When compared with ECG of 15-APR-2025 10:41, QRS axis Shifted left T wave inversion no longer evident in Inferior leads T wave inversion no longer evident in Anterior leads See ED provider note for full interpretation and clinical correlation Confirmed by Faustina Workman ([id number]) on 5/21/2025 12:30:06 AM    XR shoulder right 2+ views  Result Date: 5/20/2025  Interpreted By:  Talon Frazier and Nakamoto Kent STUDY: XR SHOULDER RIGHT 2+ VIEWS; ;  5/20/2025 10:34 pm   INDICATION: Signs/Symptoms:Swollen right shoulder joint, unable to move without significant pain.   COMPARISON: XR SHOULDER RIGHT 2+ VIEWS 5/1/2025   ACCESSION NUMBER(S): [id number]   ORDERING CLINICIAN: GELA HASSAN   FINDINGS: Three views of the right shoulder were provided.   No acute fracture or dislocation.   No radiopaque foreign body or soft tissue gas.   Similar 0.7 cm sclerotic lesion within the proximal humerus that is stable dating back to chest radiograph 06/01/2009 and likely represents benign etiology.   Diffuse demineralization is noted of the osseous structures. Mild degenerative changes of the acromioclavicular joint and glenohumeral joint.   Prominent appearance of the soft tissues about the shoulder.   Right IJ CVC with tip terminating over the expected position of the distal SVC. Visualized lungs are clear without evidence of pneumothorax, pleural effusion,  or focal consolidation.       No acute osseous abnormalities. Prominent appearance of the soft tissues about the shoulder may represent soft tissue swelling or large joint effusion. Correlate with physical exam and if clinical concern persists, consider further evaluation with MRI.   I personally reviewed the images/study and I agree with the findings as stated by Kendrick Purdy MD. This study was interpreted at [facility], [place], [place].   MACRO: None   Signed by: Talon Frazier 5/20/2025 11:34 PM Dictation workstation:   [id number]    XR shoulder right 2+ views  Result Date: 5/2/2025  Interpreted By:  Evie Benavides, STUDY: XR SHOULDER RIGHT 2+ VIEWS;  5/1/2025 11:31 am   INDICATION: Signs/Symptoms:right shoulder pain.   COMPARISON: None.   ACCESSION NUMBER(S): [id number]   ORDERING CLINICIAN: KIRAN WHITMAN   FINDINGS: Infusaport is seen.   Osteopenia is present. Hypertrophic bony changes are seen at the acromioclavicular joint, which narrow the joint. The glenohumeral joint is well preserved. Cystic changes and spurs are seen off of the humeral head. There is no evidence of acute fracture or dislocation.   No lytic or blastic lesions are seen.       Degenerative changes.       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      Updates 5/27  - 5/25 Blood culture with positive cocci clusters, likely contaminated as no signs of  infection will repeat blood culure and CTM   - Had dialysis yesterday, tacro decreased to 0.5 daily per transplant   -Transplant ID consulted , appreciate recs   - Hyperkalemia is improving,  5.5>4.9>4.1 today   - Will reach out to ID regarding savuconazole and atypical pneumonia           #Hyperkalemia  #ESRD s/p renal transplant x2, non-functioning  :: K of 5.5 on presentation to ED  :: Last session dialysis on Saturday which he reported he completed fully  :: Given calcium gluconate x1  Plan:  - Lokelma q8h, and CTM K daily   - transplant nephrology is following appreciate recs   - Continue home immunosuppressive regiment: tacro 0.5 BID > changed to 0.5 daily on 5/26 , prednisone 5mg daily        #RUE fracture of coracoid process, humeral head  #R Shoulder pain  :: Fractures evident from CT findings 5/21/25  :: Evaluated by ortho previously, underwent joint aspiration, without evidence of septic arthritis  :: Joint aspiration results 5/20: bloody, 2.4k WBC, 88% neutrophil,   Plan:  - Planned for MRI shoulder for further characterization on Wednesday, consider inpatient if still here and covered by insurance  - Pain regiment: Tylenol for mild pain, IV dilaudid 0.2 moderate/0.4 severe or breakthrough pain    #HTN  - Continue home regiment: Carvedilol 37.5mg BID, Imdur 30mt daily  Nifedipine 90mg BID    #Anemia  :: Hb 7.1 on presentation (b/l 9-10)  :: Iron panel 5/25 obtained by ED: Iron 25, TIBC 113, %sat 22  - Likely related to CKD however potential component of iron deficiency. Obtain ferritin, transferrin in addition to other labs    #Positive blood cx  :: Blood cx 5/20 at last ED presentation: 1 of 2 positive for staph hominis, other with no growth  - Likely contaminant, no evidence of current infectious process or symptoms  - Stop antibiotics and monitor  - Transplant ID consulted , appreciate recs   - Follow up repeat blood cx by ED    #T2DM  :: Home regiment: Glarine 20u AM + SSI  - 15u glargine AM while  inpatient, SSI w/ meals    F: Caution with ESRD  E: replete PRN  N: Renal, carb controlled diet  A: pIV, R chest tunneled dialysis line    DVT ppx: Heparin subcutaneous  GI ppx: home PPI    Code status: Full (Confirmed)  Surrogate: Partner Amalia ([phone])         Pt seen and discussed with Dr Sue Morna MD  PGY neurology

## 2025-07-21 NOTE — PATIENT PROFILE ADULT - NSPROPTRIGHTCAREGIVER_GEN_A_NUR
"Adult Annual Physical  Name: Navdeep Brewer      : 1971      MRN: [id number]  Encounter Provider: Enedelia Cabrera MD  Encounter Date: 2025   Encounter department: [facility]    :  Assessment & Plan  Type 2 diabetes mellitus with hyperglycemia, without long-term current use of insulin (HCC)    Lab Results   Component Value Date    HGBA1C 6.0 2025       Orders:  •  POCT hemoglobin A1c  •  Hemoglobin A1C; Future  •  Lipid panel; Future  •  Comprehensive metabolic panel; Future      Assessment & Plan            Immunizations:  - Immunizations due: Prevnar 20 and Zoster (Shingrix)         History of Present Illness   {?Quick Links Encounters * My Last Note * Last Note in Specialty * Snapshot * Since Last Visit * History :32905}  Adult Annual Physical  Review of Systems  {Select to Display PMH (Optional):01121}    Objective {?Quick Links Trend Vitals * Enter New Vitals * Results Review * Timeline (Adult) * Labs * Imaging * Cardiology * Procedures * Lung Cancer Screening * Surgical eConsent :44680}  /82 (BP Location: Left arm, Patient Position: Sitting, Cuff Size: Standard)   Pulse 74   Temp (!) 97.1 °F (36.2 °C) (Temporal)   Resp 14   Ht 5' 7\" (1.702 m)   Wt 97.1 kg (214 lb)   SpO2 98%   BMI 33.52 kg/m²     Physical Exam  {Administrative / Billing Section (Optional):21929}  " yes